# Patient Record
Sex: FEMALE | Race: WHITE | NOT HISPANIC OR LATINO | Employment: OTHER | URBAN - METROPOLITAN AREA
[De-identification: names, ages, dates, MRNs, and addresses within clinical notes are randomized per-mention and may not be internally consistent; named-entity substitution may affect disease eponyms.]

---

## 2017-04-11 ENCOUNTER — HOSPITAL ENCOUNTER (OUTPATIENT)
Dept: BONE DENSITY | Facility: IMAGING CENTER | Age: 62
Discharge: HOME | End: 2017-04-11
Payer: COMMERCIAL

## 2017-04-11 DIAGNOSIS — Z12.31 ENCOUNTER FOR SCREENING MAMMOGRAM FOR MALIGNANT NEOPLASM OF BREAST: ICD-10-CM

## 2017-04-11 PROCEDURE — G0202 SCR MAMMO BI INCL CAD: HCPCS

## 2017-07-17 LAB — HCV AB SER-ACNC: NON REACTIVE

## 2017-08-17 ENCOUNTER — ALLSCRIPTS OFFICE VISIT (OUTPATIENT)
Dept: OTHER | Facility: OTHER | Age: 62
End: 2017-08-17

## 2017-08-17 DIAGNOSIS — N39.3 STRESS INCONTINENCE: ICD-10-CM

## 2017-08-21 ENCOUNTER — ALLSCRIPTS OFFICE VISIT (OUTPATIENT)
Dept: OTHER | Facility: OTHER | Age: 62
End: 2017-08-21

## 2017-08-29 ENCOUNTER — GENERIC CONVERSION - ENCOUNTER (OUTPATIENT)
Dept: OTHER | Facility: OTHER | Age: 62
End: 2017-08-29

## 2017-10-04 ENCOUNTER — GENERIC CONVERSION - ENCOUNTER (OUTPATIENT)
Dept: OTHER | Facility: OTHER | Age: 62
End: 2017-10-04

## 2017-10-04 ENCOUNTER — GENERIC CONVERSION - ENCOUNTER (OUTPATIENT)
Dept: FAMILY MEDICINE CLINIC | Facility: CLINIC | Age: 62
End: 2017-10-04

## 2018-01-12 ENCOUNTER — GENERIC CONVERSION - ENCOUNTER (OUTPATIENT)
Dept: OTHER | Facility: OTHER | Age: 63
End: 2018-01-12

## 2018-01-12 ENCOUNTER — LAB CONVERSION - ENCOUNTER (OUTPATIENT)
Dept: OTHER | Facility: OTHER | Age: 63
End: 2018-01-12

## 2018-01-12 LAB
25(OH)D3 SERPL-MCNC: 43 NG/ML (ref 30–100)
A/G RATIO (HISTORICAL): 1.7 (CALC) (ref 1–2.5)
ALBUMIN SERPL BCP-MCNC: 4.3 G/DL (ref 3.6–5.1)
ALP SERPL-CCNC: 77 U/L (ref 33–130)
ALT SERPL W P-5'-P-CCNC: 20 U/L (ref 6–29)
AST SERPL W P-5'-P-CCNC: 16 U/L (ref 10–35)
BASOPHILS # BLD AUTO: 0.8 %
BASOPHILS # BLD AUTO: 48 CELLS/UL (ref 0–200)
BILIRUB SERPL-MCNC: 0.5 MG/DL (ref 0.2–1.2)
BUN SERPL-MCNC: 21 MG/DL (ref 7–25)
BUN/CREA RATIO (HISTORICAL): 19 (CALC) (ref 6–22)
CALCIUM SERPL-MCNC: 9.7 MG/DL (ref 8.6–10.4)
CHLORIDE SERPL-SCNC: 105 MMOL/L (ref 98–110)
CHOLEST SERPL-MCNC: 193 MG/DL
CHOLEST/HDLC SERPL: 3.3 (CALC)
CO2 SERPL-SCNC: 30 MMOL/L (ref 20–31)
CREAT SERPL-MCNC: 1.1 MG/DL (ref 0.5–0.99)
DEPRECATED RDW RBC AUTO: 12.9 % (ref 11–15)
EGFR AFRICAN AMERICAN (HISTORICAL): 61 ML/MIN/1.73M2
EGFR-AMERICAN CALC (HISTORICAL): 53 ML/MIN/1.73M2
EOSINOPHIL # BLD AUTO: 210 CELLS/UL (ref 15–500)
EOSINOPHIL # BLD AUTO: 3.5 %
GAMMA GLOBULIN (HISTORICAL): 2.6 G/DL (CALC) (ref 1.9–3.7)
GLUCOSE (HISTORICAL): 129 MG/DL (ref 65–99)
HBA1C MFR BLD HPLC: 6.4 % OF TOTAL HGB
HCT VFR BLD AUTO: 44 % (ref 35–45)
HDLC SERPL-MCNC: 58 MG/DL
HGB BLD-MCNC: 14.7 G/DL (ref 11.7–15.5)
LDL CHOLESTEROL (HISTORICAL): 116 MG/DL (CALC)
LYMPHOCYTES # BLD AUTO: 1770 CELLS/UL (ref 850–3900)
LYMPHOCYTES # BLD AUTO: 29.5 %
MCH RBC QN AUTO: 30.1 PG (ref 27–33)
MCHC RBC AUTO-ENTMCNC: 33.4 G/DL (ref 32–36)
MCV RBC AUTO: 90.2 FL (ref 80–100)
MONOCYTES # BLD AUTO: 600 CELLS/UL (ref 200–950)
MONOCYTES (HISTORICAL): 10 %
NEUTROPHILS # BLD AUTO: 3372 CELLS/UL (ref 1500–7800)
NEUTROPHILS # BLD AUTO: 56.2 %
NON-HDL-CHOL (CHOL-HDL) (HISTORICAL): 135 MG/DL (CALC)
PLATELET # BLD AUTO: 301 THOUSAND/UL (ref 140–400)
PMV BLD AUTO: 12.1 FL (ref 7.5–12.5)
POTASSIUM SERPL-SCNC: 4 MMOL/L (ref 3.5–5.3)
RBC # BLD AUTO: 4.88 MILLION/UL (ref 3.8–5.1)
SODIUM SERPL-SCNC: 143 MMOL/L (ref 135–146)
TOTAL PROTEIN (HISTORICAL): 6.9 G/DL (ref 6.1–8.1)
TRIGL SERPL-MCNC: 88 MG/DL
TSH SERPL DL<=0.05 MIU/L-ACNC: 1.04 MIU/L (ref 0.4–4.5)
WBC # BLD AUTO: 6 THOUSAND/UL (ref 3.8–10.8)

## 2018-01-13 VITALS
DIASTOLIC BLOOD PRESSURE: 84 MMHG | BODY MASS INDEX: 33.17 KG/M2 | SYSTOLIC BLOOD PRESSURE: 132 MMHG | HEIGHT: 62 IN | WEIGHT: 180.25 LBS

## 2018-01-15 ENCOUNTER — ALLSCRIPTS OFFICE VISIT (OUTPATIENT)
Dept: OTHER | Facility: OTHER | Age: 63
End: 2018-01-15

## 2018-01-15 VITALS
WEIGHT: 180.25 LBS | DIASTOLIC BLOOD PRESSURE: 72 MMHG | RESPIRATION RATE: 18 BRPM | HEIGHT: 62 IN | TEMPERATURE: 97 F | SYSTOLIC BLOOD PRESSURE: 138 MMHG | HEART RATE: 84 BPM | BODY MASS INDEX: 33.17 KG/M2

## 2018-01-22 VITALS
HEIGHT: 62 IN | HEART RATE: 78 BPM | WEIGHT: 180 LBS | BODY MASS INDEX: 33.13 KG/M2 | DIASTOLIC BLOOD PRESSURE: 86 MMHG | RESPIRATION RATE: 16 BRPM | SYSTOLIC BLOOD PRESSURE: 158 MMHG | TEMPERATURE: 97.6 F

## 2018-01-22 VITALS — SYSTOLIC BLOOD PRESSURE: 150 MMHG | DIASTOLIC BLOOD PRESSURE: 80 MMHG

## 2018-01-24 VITALS
DIASTOLIC BLOOD PRESSURE: 84 MMHG | RESPIRATION RATE: 16 BRPM | HEIGHT: 62 IN | TEMPERATURE: 98 F | WEIGHT: 176.5 LBS | SYSTOLIC BLOOD PRESSURE: 148 MMHG | BODY MASS INDEX: 32.48 KG/M2 | HEART RATE: 80 BPM

## 2018-01-24 LAB
ALBUMIN SERPL-MCNC: 4.4 G/DL (ref 3.6–5.1)
ALBUMIN/GLOB SERPL: 1.6 (CALC) (ref 1–2.5)
ALP SERPL-CCNC: 87 U/L (ref 33–130)
ALT SERPL-CCNC: 20 U/L (ref 6–29)
AST SERPL-CCNC: 16 U/L (ref 10–35)
BILIRUB DIRECT SERPL-MCNC: 0.1 MG/DL
BILIRUB INDIRECT SERPL-MCNC: 0.4 MG/DL (CALC) (ref 0.2–1.2)
BILIRUB SERPL-MCNC: 0.5 MG/DL (ref 0.2–1.2)
CK SERPL-CCNC: 38 U/L (ref 29–143)
GLOBULIN SER CALC-MCNC: 2.8 G/DL (CALC) (ref 1.9–3.7)
PROT SERPL-MCNC: 7.2 G/DL (ref 6.1–8.1)

## 2018-01-25 ENCOUNTER — TELEPHONE (OUTPATIENT)
Dept: FAMILY MEDICINE CLINIC | Facility: CLINIC | Age: 63
End: 2018-01-25

## 2018-01-25 DIAGNOSIS — R79.89 ELEVATED SERUM CREATININE: Primary | ICD-10-CM

## 2018-01-25 NOTE — TELEPHONE ENCOUNTER
Lm letting pt know her results and the conner recommendatins to have bloodwork rechecked in 1-2 weeks told her order was at the

## 2018-01-25 NOTE — PROGRESS NOTES
Previous message sent to staff regarding notifying patient of results  All results normal  She will proceed with 1/2 tablet of Atorvastatin daily  She will call with any further muscle aches

## 2018-01-25 NOTE — TELEPHONE ENCOUNTER
----- Message from [id number] sent at 1/25/2018  8:36 AM EST -----  Regarding: Blood pressure and Blood work   I received her blood pressures  They are on the border of being high  I would like for her to repeat her blood work to check her kidney function in the next 1-2 weeks  Please do not fast for this and drink plenty of fluids before having the test done  I would like to recheck her kidneys before considering adjusting her blood pressure medications  Also please let her know that her recent blood work to check her liver and muscles is normal  She can restart the Atorvastatin 1/2 tablet per day on Monday  She should let me know if she has any muscle aches on the new dose  Thank you

## 2018-02-16 ENCOUNTER — OFFICE VISIT (OUTPATIENT)
Dept: FAMILY MEDICINE CLINIC | Facility: CLINIC | Age: 63
End: 2018-02-16
Payer: COMMERCIAL

## 2018-02-16 VITALS
HEART RATE: 84 BPM | DIASTOLIC BLOOD PRESSURE: 86 MMHG | RESPIRATION RATE: 16 BRPM | WEIGHT: 174.8 LBS | SYSTOLIC BLOOD PRESSURE: 136 MMHG | BODY MASS INDEX: 33 KG/M2 | TEMPERATURE: 97.4 F | HEIGHT: 61 IN

## 2018-02-16 DIAGNOSIS — E11.9 TYPE 2 DIABETES MELLITUS WITHOUT COMPLICATION, WITHOUT LONG-TERM CURRENT USE OF INSULIN (HCC): ICD-10-CM

## 2018-02-16 DIAGNOSIS — I10 BENIGN ESSENTIAL HYPERTENSION: Primary | ICD-10-CM

## 2018-02-16 DIAGNOSIS — E78.5 DYSLIPIDEMIA: ICD-10-CM

## 2018-02-16 PROBLEM — E66.9 OBESITY (BMI 30-39.9): Status: ACTIVE | Noted: 2018-01-12

## 2018-02-16 PROBLEM — K64.9 HEMORRHOID: Status: ACTIVE | Noted: 2018-01-15

## 2018-02-16 PROBLEM — G47.33 OSA (OBSTRUCTIVE SLEEP APNEA): Status: ACTIVE | Noted: 2018-01-12

## 2018-02-16 PROBLEM — E55.9 HYPOVITAMINOSIS D: Status: ACTIVE | Noted: 2017-08-21

## 2018-02-16 PROBLEM — M17.0 OSTEOARTHRITIS OF BOTH KNEES: Status: ACTIVE | Noted: 2017-08-26

## 2018-02-16 PROCEDURE — 3075F SYST BP GE 130 - 139MM HG: CPT | Performed by: NURSE PRACTITIONER

## 2018-02-16 PROCEDURE — 99214 OFFICE O/P EST MOD 30 MIN: CPT | Performed by: NURSE PRACTITIONER

## 2018-02-16 PROCEDURE — 3079F DIAST BP 80-89 MM HG: CPT | Performed by: NURSE PRACTITIONER

## 2018-02-16 RX ORDER — AMLODIPINE BESYLATE 10 MG/1
1 TABLET ORAL DAILY
COMMUNITY
Start: 2018-01-15 | End: 2018-10-19 | Stop reason: SDUPTHER

## 2018-02-16 RX ORDER — ATORVASTATIN CALCIUM 10 MG/1
1 TABLET, FILM COATED ORAL DAILY
COMMUNITY
Start: 2018-01-15 | End: 2018-02-16 | Stop reason: SINTOL

## 2018-02-16 RX ORDER — REPAGLINIDE 0.5 MG/1
1 TABLET ORAL EVERY 8 HOURS
COMMUNITY
Start: 2018-01-15 | End: 2018-04-23 | Stop reason: SDUPTHER

## 2018-02-16 RX ORDER — PREDNISOLONE ACETATE 10 MG/ML
SUSPENSION/ DROPS OPHTHALMIC
COMMUNITY
Start: 2017-06-06 | End: 2018-02-16 | Stop reason: ALTCHOICE

## 2018-02-16 RX ORDER — LORATADINE 10 MG/1
1 TABLET ORAL DAILY PRN
COMMUNITY
Start: 2018-01-15

## 2018-02-16 RX ORDER — BIOTIN 1 MG
TABLET ORAL
COMMUNITY
Start: 2017-08-21

## 2018-02-16 RX ORDER — CHOLECALCIFEROL (VITAMIN D3) 125 MCG
CAPSULE ORAL
COMMUNITY

## 2018-02-16 RX ORDER — FLUTICASONE PROPIONATE 50 MCG
2 SPRAY, SUSPENSION (ML) NASAL DAILY PRN
COMMUNITY

## 2018-02-16 RX ORDER — LISINOPRIL 40 MG/1
1 TABLET ORAL DAILY
COMMUNITY
Start: 2014-09-30 | End: 2018-03-20 | Stop reason: SDUPTHER

## 2018-02-16 NOTE — ASSESSMENT & PLAN NOTE
Blood pressure borderline on Amlodipine 10 mg daily and lisinopril 40 mg daily  Will consider adjusting her medication based on BMP results  I requested she repeat BMP end of January for climbing creatinine  States she did have this done at [address], no results noted in chart  Will call [facility] to inquire and will let patient know results  She will hold onto current slip for BMP, until she hears from us regarding recent results  She will continue to work on lifestyle modifications, including reducing caffeine intake

## 2018-02-16 NOTE — ASSESSMENT & PLAN NOTE
Started on Repaglinide one month ago  She is tolerating medication well  Taking this medication 1-2 times per day with meals  A1C in April  Foot exam up to date as of January  Eye exam completed in January as well  Follow up in 3 months  Declines diabetic nutrition teaching  Continue with lifestyle modifications with diet and exercise

## 2018-02-16 NOTE — ASSESSMENT & PLAN NOTE
Stopped taking Atorvastatin due to muscle aches, and she felt this increased her BP  She does not desire to take another statin at this time  She will try Red Yeast Rice and Flaxseed oil  Repeat Lipid panel in April  Continue lifestyle modifications with diet and exercise

## 2018-02-16 NOTE — PROGRESS NOTES
FAMILY PRACTICE OFFICE VISIT       NAME: Mandie Landis  AGE: 57 y o  SEX: female       : 1955        MRN: [id number]    DATE: 2018  TIME: 1:54 PM    Assessment and Plan     Problem List Items Addressed This Visit     Benign essential hypertension - Primary     Blood pressure borderline on Amlodipine 10 mg daily and lisinopril 40 mg daily  Will consider adjusting her medication based on BMP results  I requested she repeat BMP end of January for climbing creatinine  States she did have this done at [address], no results noted in chart  Will call [facility] to inquire and will let patient know results  She will hold onto current slip for BMP, until she hears from us regarding recent results  She will continue to work on lifestyle modifications, including reducing caffeine intake  Relevant Medications    amLODIPine (NORVASC) 10 mg tablet    lisinopril (ZESTRIL) 40 mg tablet    Other Relevant Orders    Basic metabolic panel    Dyslipidemia     Stopped taking Atorvastatin due to muscle aches, and she felt this increased her BP  She does not desire to take another statin at this time  She will try Red Yeast Rice and Flaxseed oil  Repeat Lipid panel in April  Continue lifestyle modifications with diet and exercise  Type 2 diabetes mellitus ([address] )     Started on Repaglinide one month ago  She is tolerating medication well  Taking this medication 1-2 times per day with meals  A1C in April  Foot exam up to date as of January  Eye exam completed in January as well  Follow up in 3 months  Declines diabetic nutrition teaching  Continue with lifestyle modifications with diet and exercise  Relevant Medications    repaglinide (PRANDIN) 0 5 mg tablet    Other Relevant Orders    Basic metabolic panel    Ambulatory referral to medical nutrition therapy for diabetes        Follow up in 3 months       Chief Complaint     Chief Complaint   Patient presents with    Follow-up     Patient is here for a followup  History of Present Illness     Mrs Maria Dewitt presents today for follow up exam      Blood pressure is running borderline at home, 130's-140's/80's  Currently taking amlodipine and lisinopril  She has cut her caffeine consumption in half  Working on cutting out all caffeine  She stopped taking Atorvastatin due to muscle aches in her upper arms and upper thighs  She also felt her BP was higher while taking Atorvastain  She does not wish to try another statin at this time  She is taking Prandin 1-2 times per day with her meals  She does not eat breakfast usually  Lunch she usually eats a sandwich  She noted if she took the medication and just had a sandwich for lunch, by late afternoon she felt as if her sugar was low with "starving, and feeling as if I had to eat " She always takes Prandin with dinner, and if she has a big meal at lunch  She is cutting back on candy and sweets  Review of Systems   Review of Systems   Constitutional: Negative  HENT: Negative  Eyes: Negative  Respiratory: Negative  Cardiovascular: Negative  Gastrointestinal: Negative  Stools improved on Prandin, used to have frequent loose stools, now stools are  More formed and less frequent  No constipation  Endocrine: Negative  Genitourinary: Negative for difficulty urinating  Musculoskeletal: Negative for myalgias  Neurological: Negative for dizziness, weakness, light-headedness and headaches  Hematological: Negative for adenopathy  Does not bruise/bleed easily  Psychiatric/Behavioral: Negative  Active Problem List     Patient Active Problem List   Diagnosis    Benign essential hypertension    Dyslipidemia    Type 2 diabetes mellitus (HCC)    Osteoarthritis of both knees    ERNESTINE (obstructive sleep apnea)    Obesity (BMI 30-39  9)    Hypovitaminosis D    Hemorrhoid       Past Medical History:  Past Medical History:   Diagnosis Date    Adhesive capsulitis of right shoulder     LAST ASSESSED 10/12/18     2 para 2     Hypertension     Nephrolithiasis     Prediabetes     LAST ASSESSED 17       Past Surgical History:  Past Surgical History:   Procedure Laterality Date    ABDOMINAL HYSTERECTOMY      CATARACT EXTRACTION      GALLBLADDER SURGERY      TONSILLECTOMY         Family History:  Family History   Problem Relation Age of Onset    Alcohol abuse Mother     Hypertension Mother     Stroke Mother     Breast cancer Sister        Social History:  Social History     Social History    Marital status: /Civil Union     Spouse name: N/A    Number of children: 2    Years of education: N/A     Occupational History    RETIRED      Social History Main Topics    Smoking status: Never Smoker    Smokeless tobacco: Never Used    Alcohol use No    Drug use: No    Sexual activity: Not on file     Other Topics Concern    Not on file     Social History Narrative    CAFFEINE USE     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           I have reviewed the patient's medical history in detail; there are no changes to the history as noted in the electronic medical record  Objective     Vitals:    18 0736   BP: 136/86   Pulse: 84   Resp: 16   Temp: (!) 97 4 °F (36 3 °C)   TempSrc: Tympanic   Weight: 79 3 kg (174 lb 12 8 oz)   Height: 5' 1" (1 549 m)   Body mass index is 33 03 kg/m²  Wt Readings from Last 3 Encounters:   18 79 3 kg (174 lb 12 8 oz)   01/15/18 80 1 kg (176 lb 8 oz)   10/04/17 81 6 kg (180 lb)       Physical Exam   Constitutional: She is oriented to person, place, and time  She appears well-developed and well-nourished  HENT:   Head: Normocephalic and atraumatic  Mouth/Throat: Oropharynx is clear and moist    Eyes: Conjunctivae are normal    Neck: Normal range of motion  Neck supple  No thyromegaly present  Cardiovascular: Normal rate and regular rhythm  No murmur heard    Pulmonary/Chest: Effort normal and breath sounds normal  Musculoskeletal: Normal range of motion  She exhibits no edema  Lymphadenopathy:     She has no cervical adenopathy  Neurological: She is alert and oriented to person, place, and time  Skin: No rash noted  Psychiatric: She has a normal mood and affect  Nursing note and vitals reviewed  Pertinent Laboratory/Diagnostic Studies:    Lab Results   Component Value Date    CHOL 193 01/11/2018     Lab Results   Component Value Date    TRIG 88 01/11/2018     Lab Results   Component Value Date    HDL 58 01/11/2018     No results found for: [facility]  Lab Results   Component Value Date    HGBA1C 6 4 (H) 01/11/2018       Results for orders placed or performed in visit on 01/24/18   Hepatic function panel   Result Value Ref Range    SL AMB PROTEIN, TOTAL 7 2 6 1 - 8 1 g/dL    Serum Albumin 4 4 3 6 - 5 1 g/dL    SL AMB GLOBULIN 2 8 1 9 - 3 7 g/dL (calc)    SL AMB ALBUMIN/GLOBULIN RATIO 1 6 1 0 - 2 5 (calc)    SL AMB BILIRUBIN, TOTAL 0 5 0 2 - 1 2 mg/dL    SL AMB BILIRUBIN, DIRECT 0 1 < OR = 0 2 mg/dL    Bilirubin, Indirect 0 4 0 2 - 1 2 mg/dL (calc)    SL AMB ALKALINE PHOSPHATASE 87 33 - 130 U/L    SL AMB AST 16 10 - 35 U/L    SL AMB ALT 20 6 - 29 U/L   Creatine Kinase, Total   Result Value Ref Range    SL AMB CREATINE KINASE, TOTAL, SERUM 38 29 - 143 U/L       Orders Placed This Encounter   Procedures    Basic metabolic panel    Ambulatory referral to medical nutrition therapy for diabetes       ALLERGIES:  Allergies   Allergen Reactions    Sulfa Antibiotics Myalgia     Action Taken: joint pain and stiffness;        Current Medications     Current Outpatient Prescriptions   Medication Sig Dispense Refill    amLODIPine (NORVASC) 10 mg tablet Take 1 tablet by mouth daily      Cholecalciferol (VITAMIN D3) 1000 units CAPS Take by mouth      fluticasone (FLONASE) 50 mcg/act nasal spray 2 sprays into each nostril daily      Lactobacillus (PROBIOTIC ACIDOPHILUS) CAPS Take 1 tablet daily as supplement        lisinopril (ZESTRIL) 40 mg tablet Take 1 tablet by mouth daily      loratadine (CLARITIN) 10 mg tablet Take 1 tablet by mouth daily as needed      repaglinide (PRANDIN) 0 5 mg tablet Take 1 tablet by mouth every 8 (eight) hours       No current facility-administered medications for this visit            Health Maintenance     Health Maintenance   Topic Date Due    HIV SCREENING  1955    Hepatitis C Screening  1955    DTaP,Tdap,and Td Vaccines (1 - Tdap) 10/26/1960    Diabetic Foot Exam  10/26/1963    OPHTHALMOLOGY EXAM  10/26/1963    URINE MICROALBUMIN  10/26/1963    Depression Screening PHQ-9  10/26/1965    INFLUENZA VACCINE  Completed     Immunization History   Administered Date(s) Administered    Influenza Quadrivalent Preservative Free 3 years and older IM 10/04/2017    Influenza TIV (IM) 09/01/2014, 01/05/2016, 10/26/2016    Pneumococcal Polysaccharide PPV23 07/12/2016    Zoster 01/01/2014, 08/30/2016       JOSHUA Padilla

## 2018-02-22 LAB
BUN SERPL-MCNC: 18 MG/DL (ref 7–25)
BUN/CREAT SERPL: 18 (CALC) (ref 6–22)
CALCIUM SERPL-MCNC: 9.5 MG/DL (ref 8.6–10.4)
CHLORIDE SERPL-SCNC: 102 MMOL/L (ref 98–110)
CO2 SERPL-SCNC: 28 MMOL/L (ref 20–31)
CREAT SERPL-MCNC: 1 MG/DL (ref 0.5–0.99)
GLUCOSE SERPL-MCNC: 90 MG/DL (ref 65–99)
POTASSIUM SERPL-SCNC: 4 MMOL/L (ref 3.5–5.3)
SL AMB EGFR AFRICAN AMERICAN: 69 ML/MIN/1.73M2
SL AMB EGFR NON AFRICAN AMERICAN: 59 ML/MIN/1.73M2
SODIUM SERPL-SCNC: 140 MMOL/L (ref 135–146)

## 2018-02-26 NOTE — RESULT NOTES
Verified Results  (1) COMPREHENSIVE METABOLIC PANEL [id number] 55:13WX Melanie Peña     Test Name Result Flag Reference   GLUCOSE 129 mg/dL H 65-99   Fasting reference interval     For someone without known diabetes, a glucose  value >125 mg/dL indicates that they may have  diabetes and this should be confirmed with a  follow-up test    UREA NITROGEN (BUN) 21 mg/dL  7-25   CREATININE 1 10 mg/dL H 0 50-0 99   For patients >52years of age, the reference limit  for Creatinine is approximately 13% higher for people  identified as -American  eGFR NON-AFR   AMERICAN 53 mL/min/1 73m2 L > OR = 60   eGFR AFRICAN AMERICAN 61 mL/min/1 73m2  > OR = 60   BUN/CREATININE RATIO 19 (calc)  6-22   SODIUM 143 mmol/L  135-146   POTASSIUM 4 0 mmol/L  3 5-5 3   CHLORIDE 105 mmol/L     CARBON DIOXIDE 30 mmol/L  20-31   CALCIUM 9 7 mg/dL  8 6-10 4   PROTEIN, TOTAL 6 9 g/dL  6 1-8 1   ALBUMIN 4 3 g/dL  3 6-5 1   GLOBULIN 2 6 g/dL (calc)  1 9-3 7   ALBUMIN/GLOBULIN RATIO 1 7 (calc)  1 0-2 5   BILIRUBIN, TOTAL 0 5 mg/dL  0 2-1 2   ALKALINE PHOSPHATASE 77 U/L     AST 16 U/L  10-35   ALT 20 U/L  6-29     (1) CBC/PLT/DIFF [id number] 08:27AM Ariadna Arroyo     Test Name Result Flag Reference   WHITE BLOOD CELL COUNT 6 0 Thousand/uL  3 8-10 8   RED BLOOD CELL COUNT 4 88 Million/uL  3 80-5 10   HEMOGLOBIN 14 7 g/dL  11 7-15 5   HEMATOCRIT 44 0 %  35 0-45 0   MCV 90 2 fL  80 0-100 0   MCH 30 1 pg  27 0-33 0   MCHC 33 4 g/dL  32 0-36 0   RDW 12 9 %  11 0-15 0   PLATELET COUNT 936 Thousand/uL  140-400   ABSOLUTE NEUTROPHILS 3372 cells/uL  7807-6662   ABSOLUTE LYMPHOCYTES 1770 cells/uL  850-3900   ABSOLUTE MONOCYTES 600 cells/uL  200-950   ABSOLUTE EOSINOPHILS 210 cells/uL     ABSOLUTE BASOPHILS 48 cells/uL  0-200   NEUTROPHILS 56 2 %     LYMPHOCYTES 29 5 %     MONOCYTES 10 0 %     EOSINOPHILS 3 5 %     BASOPHILS 0 8 %     MPV 12 1 fL  7 5-12 5     (Q) LIPID PANEL WITH REFLEX TO DIRECT LDL [id number] 08:27AM Melanie [facility]     Test Name Result Flag Reference   CHOLESTEROL, TOTAL 193 mg/dL  <200   HDL CHOLESTEROL 58 mg/dL  >06   TRIGLICERIDES 88 mg/dL  <605   LDL-CHOLESTEROL 116 mg/dL (calc) H    Reference range: <100     Desirable range <100 mg/dL for patients with CHD or  diabetes and <70 mg/dL for diabetic patients with  known heart disease  LDL-C is now calculated using the Ap-Vitale   calculation, which is a validated novel method providing   better accuracy than the Friedewald equation in the   estimation of LDL-C  Rogelio Alvarez  [facility]  2981;418(10): [id number]   (http://ThetaRay/faq/QIS140)   CHOL/HDLC RATIO 3 3 (calc)  <5 0   NON HDL CHOLESTEROL 135 mg/dL (calc) H <130   For patients with diabetes plus 1 major ASCVD risk   factor, treating to a non-HDL-C goal of <100 mg/dL   (LDL-C of <70 mg/dL) is considered a therapeutic   option  (Q) TSH, 3RD GENERATION [id number] 08:27AM Kelly Stewart     Test Name Result Flag Reference   TSH 1 04 mIU/L  0 40-4 50     *(Q) VITAMIN D, 25-HYDROXY, LC/MS/MS [id number] 08:27AM Ariadna Feliciano     Test Name Result Flag Reference   VITAMIN D, 25-OH, TOTAL 43 ng/mL     Vitamin D Status         25-OH Vitamin D:     Deficiency:                    <20 ng/mL  Insufficiency:             20 - 29 ng/mL  Optimal:                 > or = 30 ng/mL     For 25-OH Vitamin D testing on patients on   D2-supplementation and patients for whom quantitation   of D2 and D3 fractions is required, the QuestAssureD(TM)  25-OH VIT D, (D2,D3), LC/MS/MS is recommended: order   code 88048 (patients >2yrs)  For more information on this test, go to:  http://Hiveoo/faq/VHV234  (This link is being provided for   informational/educational purposes only )     (Q) HEMOGLOBIN A1c [id number] 08:27AM Ariadna Feliciano   REPORT COMMENT:  FASTING:YES     Test Name Result Flag Reference   HEMOGLOBIN A1c 6 4 % of total Hgb H <5 7   For someone without known diabetes, a hemoglobin   A1c value between 5 7% and 6 4% is consistent with  prediabetes and should be confirmed with a   follow-up test      For someone with known diabetes, a value <7%  indicates that their diabetes is well controlled  A1c  targets should be individualized based on duration of  diabetes, age, comorbid conditions, and other  considerations  This assay result is consistent with an increased risk  of diabetes  Currently, no consensus exists regarding use of  hemoglobin A1c for diagnosis of diabetes for children

## 2018-02-28 DIAGNOSIS — I10 ESSENTIAL HYPERTENSION: Primary | ICD-10-CM

## 2018-02-28 RX ORDER — METOPROLOL SUCCINATE 25 MG/1
25 TABLET, EXTENDED RELEASE ORAL DAILY
Qty: 30 TABLET | Refills: 3 | Status: SHIPPED | OUTPATIENT
Start: 2018-02-28 | End: 2018-03-20 | Stop reason: SDUPTHER

## 2018-02-28 NOTE — PROGRESS NOTES
Blood work including kidney function is stable  I would like to add an additional medication to better control her blood pressure, in addition to her amlodipine and lisinopril  I have sent a prescription to the pharmacy for Metoprolol 25 mg daily  Please have her check her blood pressures and send me her blood pressure log in 2 weeks  Please have her call with any questions

## 2018-03-20 ENCOUNTER — TELEPHONE (OUTPATIENT)
Dept: FAMILY MEDICINE CLINIC | Facility: CLINIC | Age: 63
End: 2018-03-20

## 2018-03-20 DIAGNOSIS — I10 ESSENTIAL HYPERTENSION: ICD-10-CM

## 2018-03-20 PROCEDURE — 4010F ACE/ARB THERAPY RXD/TAKEN: CPT | Performed by: NURSE PRACTITIONER

## 2018-03-20 RX ORDER — LISINOPRIL 40 MG/1
40 TABLET ORAL DAILY
Qty: 90 TABLET | Refills: 3 | Status: SHIPPED | OUTPATIENT
Start: 2018-03-20 | End: 2019-03-20 | Stop reason: SDUPTHER

## 2018-03-20 RX ORDER — METOPROLOL SUCCINATE 25 MG/1
25 TABLET, EXTENDED RELEASE ORAL DAILY
Qty: 90 TABLET | Refills: 3 | Status: SHIPPED | OUTPATIENT
Start: 2018-03-20 | End: 2018-06-18 | Stop reason: SDUPTHER

## 2018-03-20 NOTE — TELEPHONE ENCOUNTER
Please call patient:    I received her blood pressures  Some blood pressures are excellent 120's/70's, and some are still borderline 150's/70's  If she could bring in her blood pressure log again in 2 weeks, I would appreciate it  I did send her prescriptions to the pharmacy for Lisinopril and Metoprolol  Thank you

## 2018-04-13 ENCOUNTER — TELEPHONE (OUTPATIENT)
Dept: FAMILY MEDICINE CLINIC | Facility: CLINIC | Age: 63
End: 2018-04-13

## 2018-04-13 NOTE — TELEPHONE ENCOUNTER
Please call patient and let her know, I received her blood pressures  Please thank her for bringing them in  BP is coming down nicely  Continue with the same medication  Follow up as scheduled

## 2018-04-17 LAB
ALBUMIN SERPL-MCNC: 4.2 G/DL (ref 3.6–5.1)
ALBUMIN/GLOB SERPL: 1.4 (CALC) (ref 1–2.5)
ALP SERPL-CCNC: 77 U/L (ref 33–130)
ALT SERPL-CCNC: 21 U/L (ref 6–29)
AST SERPL-CCNC: 17 U/L (ref 10–35)
BILIRUB SERPL-MCNC: 0.5 MG/DL (ref 0.2–1.2)
BUN SERPL-MCNC: 18 MG/DL (ref 7–25)
BUN/CREAT SERPL: ABNORMAL (CALC) (ref 6–22)
CALCIUM SERPL-MCNC: 9.5 MG/DL (ref 8.6–10.4)
CHLORIDE SERPL-SCNC: 103 MMOL/L (ref 98–110)
CHOLEST SERPL-MCNC: 214 MG/DL
CHOLEST/HDLC SERPL: 3.5 (CALC)
CO2 SERPL-SCNC: 30 MMOL/L (ref 20–31)
CREAT SERPL-MCNC: 0.96 MG/DL (ref 0.5–0.99)
GLOBULIN SER CALC-MCNC: 2.9 G/DL (CALC) (ref 1.9–3.7)
GLUCOSE SERPL-MCNC: 104 MG/DL (ref 65–99)
HBA1C MFR BLD: 5.7 % OF TOTAL HGB
HDLC SERPL-MCNC: 61 MG/DL
LDLC SERPL CALC-MCNC: 129 MG/DL (CALC)
NONHDLC SERPL-MCNC: 153 MG/DL (CALC)
POTASSIUM SERPL-SCNC: 4.3 MMOL/L (ref 3.5–5.3)
PROT SERPL-MCNC: 7.1 G/DL (ref 6.1–8.1)
SL AMB EGFR AFRICAN AMERICAN: 72 ML/MIN/1.73M2
SL AMB EGFR NON AFRICAN AMERICAN: 62 ML/MIN/1.73M2
SODIUM SERPL-SCNC: 141 MMOL/L (ref 135–146)
TRIGL SERPL-MCNC: 128 MG/DL

## 2018-04-19 ENCOUNTER — TELEPHONE (OUTPATIENT)
Dept: FAMILY MEDICINE CLINIC | Facility: CLINIC | Age: 63
End: 2018-04-19

## 2018-04-19 NOTE — TELEPHONE ENCOUNTER
----- Message from [id number] [facility] sent at 4/19/2018 11:41 AM EDT -----  Kidney function is stable  Hemoglobin A1C is down to 5 7% with Prandin  Keep up the good work  Cholesterol is elevated compared to January  Continue to work on eating a low fat, low cholesterol, low carbohydrate diet and increasing activity  I would also like her to begin taking a daily baby aspirin, which is 81 mg per day  Follow up as scheduled

## 2018-04-19 NOTE — PROGRESS NOTES
Kidney function is stable  Hemoglobin A1C is down to 5 7% with Prandin  Keep up the good work  Cholesterol is elevated compared to January  Continue to work on eating a low fat, low cholesterol, low carbohydrate diet and increasing activity  I would also like her to begin taking a daily baby aspirin, which is 81 mg per day  Follow up as scheduled

## 2018-04-23 ENCOUNTER — OFFICE VISIT (OUTPATIENT)
Dept: FAMILY MEDICINE CLINIC | Facility: CLINIC | Age: 63
End: 2018-04-23
Payer: COMMERCIAL

## 2018-04-23 VITALS
WEIGHT: 177 LBS | HEIGHT: 61 IN | SYSTOLIC BLOOD PRESSURE: 140 MMHG | RESPIRATION RATE: 16 BRPM | HEART RATE: 70 BPM | DIASTOLIC BLOOD PRESSURE: 76 MMHG | TEMPERATURE: 98.2 F | BODY MASS INDEX: 33.42 KG/M2

## 2018-04-23 DIAGNOSIS — G47.33 OBSTRUCTIVE SLEEP APNEA: ICD-10-CM

## 2018-04-23 DIAGNOSIS — Z12.11 COLON CANCER SCREENING: ICD-10-CM

## 2018-04-23 DIAGNOSIS — Z00.00 ROUTINE HEALTH MAINTENANCE: ICD-10-CM

## 2018-04-23 DIAGNOSIS — I10 HYPERTENSION, UNSPECIFIED TYPE: Primary | ICD-10-CM

## 2018-04-23 DIAGNOSIS — E78.5 HYPERLIPIDEMIA, UNSPECIFIED HYPERLIPIDEMIA TYPE: ICD-10-CM

## 2018-04-23 DIAGNOSIS — E11.9 TYPE 2 DIABETES MELLITUS WITHOUT COMPLICATION, WITHOUT LONG-TERM CURRENT USE OF INSULIN (HCC): ICD-10-CM

## 2018-04-23 PROCEDURE — 99214 OFFICE O/P EST MOD 30 MIN: CPT | Performed by: FAMILY MEDICINE

## 2018-04-23 RX ORDER — REPAGLINIDE 0.5 MG/1
0.5 TABLET ORAL EVERY 8 HOURS
Qty: 180 TABLET | Refills: 2 | Status: SHIPPED | OUTPATIENT
Start: 2018-04-23 | End: 2018-08-27 | Stop reason: SDUPTHER

## 2018-04-23 NOTE — ASSESSMENT & PLAN NOTE
Patient states she tried to wear CPAP however was unable to wear this as changes positions frequently at night  Patient states she feels well rested in the morning    May benefit from evaluation by sleep specialist

## 2018-04-23 NOTE — ASSESSMENT & PLAN NOTE
BP initially elevated, however upon recheck, decreased  Will continue to monitor  Continue with amlodipine, lisinopril, metoprolol as well as lifestyle modifications  Patient does maintain active lifestyle and tries to put in 10,000 steps daily

## 2018-04-23 NOTE — ASSESSMENT & PLAN NOTE
Patient has previously taken atorvastatin however unable to tolerate this due to side effects  She will continue with lifestyle modifications including diet and exercise

## 2018-04-23 NOTE — PROGRESS NOTES
FAMILY PRACTICE OFFICE VISIT       NAME: Erickson Zhu  AGE: 57 y o  SEX: female       : 1955        MRN: [id number]    DATE: 2018  TIME: 9:30 PM    Assessment and Plan     Problem List Items Addressed This Visit     Type 2 diabetes mellitus ([address] )      Recent A1c is noted to be 5 7  I have asked patient to lower her frequency of prandin however patient would like to continue with her current regimen which is twice daily with meals  Will provide  Blood work Rx to recheck A1c in 4 months  Continue with lifestyle modifications  Up-to-date with ophthalmology and foot exams  Relevant Medications    repaglinide (PRANDIN) 0 5 mg tablet    Other Relevant Orders    HEMOGLOBIN A1C W/ EAG ESTIMATION    Basic metabolic panel    Microalbumin / creatinine urine ratio    Obstructive sleep apnea      Patient states she tried to wear CPAP however was unable to wear this as changes positions frequently at night  Patient states she feels well rested in the morning  May benefit from evaluation by sleep specialist          Hypertension - Primary      BP initially elevated, however upon recheck, decreased  Will continue to monitor  Continue with amlodipine, lisinopril, metoprolol as well as lifestyle modifications  Patient does maintain active lifestyle and tries to put in 10,000 steps daily  Relevant Orders    Basic metabolic panel    Hyperlipidemia       Patient has previously taken atorvastatin however unable to tolerate this due to side effects  She will continue with lifestyle modifications including diet and exercise  Routine health maintenance       Patient will schedule mammogram which is ordered by gyn  Referral to GI provided for screening colonoscopy as patient is due  Other Visit Diagnoses     Colon cancer screening        Relevant Orders    Ambulatory referral to Gastroenterology        Follow-up in 4 months with [facility] or sooner if needed        There are no Patient Instructions on file for this visit  Chief Complaint     Chief Complaint   Patient presents with    Follow-up     Patient presents today for her 3 month follow up  History of Present Illness     HPI    Patient is here for follow-up of chronic medical conditions  Patient did have recent blood work done  States she is doing well overall  Patient states she has a history of sleep apnea however does not use or sleep mask as she changes positions too frequently and was not able to tolerate the sleep mask  Patient states she does feel well rested when she wakes up in the morning  She has been very active and gets approximately 10,000 steps daily  Review of Systems   Review of Systems   Constitutional: Negative for unexpected weight change  HENT: Negative  Eyes: Negative for visual disturbance  Respiratory: Negative for shortness of breath  Cardiovascular: Negative  Gastrointestinal: Negative for abdominal pain and constipation  Genitourinary: Negative for dysuria  Psychiatric/Behavioral: Negative for dysphoric mood and sleep disturbance  Active Problem List     Patient Active Problem List   Diagnosis    Benign essential hypertension    Dyslipidemia    Type 2 diabetes mellitus ([address] )    Osteoarthritis of both knees    Obstructive sleep apnea    Obesity (BMI 30-39  9)    Hypovitaminosis D    Hemorrhoid    Hypertension    Hyperlipidemia    Routine health maintenance       Past Medical History:  Past Medical History:   Diagnosis Date    Adhesive capsulitis of right shoulder     LAST ASSESSED 10/12/18     2 para 2     Hypertension     Nephrolithiasis     Prediabetes     LAST ASSESSED 17       Past Surgical History:  Past Surgical History:   Procedure Laterality Date    ABDOMINAL HYSTERECTOMY      CATARACT EXTRACTION      GALLBLADDER SURGERY      TONSILLECTOMY         Family History:  Family History   Problem Relation Age of Onset    Alcohol abuse Mother     Hypertension Mother     Stroke Mother     Breast cancer Sister        Social History:  Social History     Social History    Marital status: /Civil Union     Spouse name: N/A    Number of children: 2    Years of education: N/A     Occupational History    RETIRED      Social History Main Topics    Smoking status: Never Smoker    Smokeless tobacco: Never Used    Alcohol use No    Drug use: No    Sexual activity: Not on file     Other Topics Concern    Not on file     Social History Narrative    CAFFEINE USE     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         I have reviewed the patient's medical history in detail; there are no changes to the history as noted in the electronic medical record  Objective     Vitals:    04/23/18 1005   BP: 140/76   Pulse:    Resp:    Temp:      Wt Readings from Last 3 Encounters:   04/23/18 80 3 kg (177 lb)   02/16/18 79 3 kg (174 lb 12 8 oz)   01/15/18 80 1 kg (176 lb 8 oz)     Vitals:    04/23/18 0927 04/23/18 1005   BP: 144/74 140/76   BP Location: Right arm    Patient Position: Sitting    Cuff Size: Large    Pulse: 70    Resp: 16    Temp: 98 2 °F (36 8 °C)    TempSrc: Tympanic    Weight: 80 3 kg (177 lb)    Height: 5' 1" (1 549 m)        Physical Exam   Constitutional: She is oriented to person, place, and time  She appears well-developed and well-nourished  HENT:   Head: Normocephalic and atraumatic  Mouth/Throat: Oropharynx is clear and moist      Tms intact and clear   Eyes: Conjunctivae and EOM are normal  Pupils are equal, round, and reactive to light  Neck: Normal range of motion  Neck supple  No thyromegaly present  Cardiovascular: Normal rate, regular rhythm and normal heart sounds  Pulmonary/Chest: Effort normal and breath sounds normal    Abdominal: Soft  Bowel sounds are normal  She exhibits no distension  There is no tenderness  Musculoskeletal: Normal range of motion  She exhibits no edema     Lymphadenopathy:     She has no cervical adenopathy  Neurological: She is alert and oriented to person, place, and time  Psychiatric: She has a normal mood and affect  Nursing note and vitals reviewed        Pertinent Laboratory/Diagnostic Studies:  Lab Results   Component Value Date    GLUCOSE 101 02/12/2014    BUN 18 04/16/2018    CREATININE 0 96 04/16/2018    CALCIUM 9 5 04/16/2018     01/11/2018    K 4 0 01/11/2018    CO2 30 01/11/2018     01/11/2018     Lab Results   Component Value Date    ALT 20 01/11/2018    AST 16 01/11/2018    ALKPHOS 77 01/11/2018    BILITOT 0 5 01/11/2018       Lab Results   Component Value Date    WBC 6 0 01/11/2018    HGB 14 7 01/11/2018    HCT 44 0 01/11/2018    MCV 90 2 01/11/2018     01/11/2018       No results found for: TSH    Lab Results   Component Value Date    CHOL 193 01/11/2018     Lab Results   Component Value Date    TRIG 128 04/16/2018     Lab Results   Component Value Date    HDL 58 01/11/2018     No results found for: [facility]  Lab Results   Component Value Date    HGBA1C 5 7 (H) 04/16/2018       Results for orders placed or performed in visit on 04/16/18   Lipid panel   Result Value Ref Range    Total Cholesterol 214 (H) <200 mg/dL    SL AMB HDL CHOLESTEROL 61 >50 mg/dL    Triglycerides 128 <150 mg/dL    SL AMB LDL-CHOLESTEROL 129 (H) mg/dL (calc)    SL AMB CHOL/HDLC RATIO 3 5 <5 0 (calc)    SL AMB NON HDL CHOLESTEROL 153 (H) <130 mg/dL (calc)   Comprehensive metabolic panel   Result Value Ref Range    SL AMB GLUCOSE 104 (H) 65 - 99 mg/dL    BUN 18 7 - 25 mg/dL    Creatinine, Serum 0 96 0 50 - 0 99 mg/dL    eGFR Non  62 > OR = 60 mL/min/1 73m2    SL AMB EGFR  72 > OR = 60 mL/min/1 73m2    SL AMB BUN/CREATININE RATIO NOT APPLICABLE 6 - 22 (calc)    SL AMB SODIUM 141 135 - 146 mmol/L    SL AMB POTASSIUM 4 3 3 5 - 5 3 mmol/L    SL AMB CHLORIDE 103 98 - 110 mmol/L    SL AMB CARBON DIOXIDE 30 20 - 31 mmol/L    SL AMB CALCIUM 9 5 8 6 - 10 4 mg/dL    SL AMB PROTEIN, TOTAL 7 1 6 1 - 8 1 g/dL    Serum Albumin 4 2 3 6 - 5 1 g/dL    SL AMB GLOBULIN 2 9 1 9 - 3 7 g/dL (calc)    SL AMB ALBUMIN/GLOBULIN RATIO 1 4 1 0 - 2 5 (calc)    SL AMB BILIRUBIN, TOTAL 0 5 0 2 - 1 2 mg/dL    SL AMB ALKALINE PHOSPHATASE 77 33 - 130 U/L    SL AMB AST 17 10 - 35 U/L    SL AMB ALT 21 6 - 29 U/L   Hemoglobin A1c   Result Value Ref Range    Hemoglobin A1C 5 7 (H) <5 7 % of total Hgb       Orders Placed This Encounter   Procedures    HEMOGLOBIN A1C W/ EAG ESTIMATION    Basic metabolic panel    Microalbumin / creatinine urine ratio    Ambulatory referral to Gastroenterology       ALLERGIES:  Allergies   Allergen Reactions    Sulfa Antibiotics Myalgia     Action Taken: joint pain and stiffness;        Current Medications     Current Outpatient Prescriptions   Medication Sig Dispense Refill    amLODIPine (NORVASC) 10 mg tablet Take 1 tablet by mouth daily      Cholecalciferol (VITAMIN D3) 1000 units CAPS Take by mouth      fluticasone (FLONASE) 50 mcg/act nasal spray 2 sprays into each nostril daily      Lactobacillus (PROBIOTIC ACIDOPHILUS) CAPS Take 1 tablet daily as supplement   lisinopril (ZESTRIL) 40 mg tablet Take 1 tablet (40 mg total) by mouth daily 90 tablet 3    loratadine (CLARITIN) 10 mg tablet Take 1 tablet by mouth daily as needed      metoprolol succinate (TOPROL-XL) 25 mg 24 hr tablet Take 1 tablet (25 mg total) by mouth daily 90 tablet 3    repaglinide (PRANDIN) 0 5 mg tablet Take 1 tablet (0 5 mg total) by mouth every 8 (eight) hours 180 tablet 2     No current facility-administered medications for this visit            Health Maintenance     Health Maintenance   Topic Date Due    HIV SCREENING  1955    Hepatitis C Screening  1955    COLONOSCOPY  1955    Diabetic Foot Exam  10/26/1963    OPHTHALMOLOGY EXAM  10/26/1963    URINE MICROALBUMIN  10/26/1963    DTaP,Tdap,and Td Vaccines (1 - Tdap) 10/26/1974    Depression Screening PHQ-9 02/16/2019    INFLUENZA VACCINE  Completed     Immunization History   Administered Date(s) Administered    Influenza Quadrivalent Preservative Free 3 years and older IM 10/04/2017    Influenza TIV (IM) 09/01/2014, 01/05/2016, 10/26/2016    Pneumococcal Polysaccharide PPV23 07/12/2016    Zoster 01/01/2014, 08/30/2016       Fredis Mehta MD

## 2018-04-24 NOTE — ASSESSMENT & PLAN NOTE
Recent A1c is noted to be 5 7  I have asked patient to lower her frequency of prandin however patient would like to continue with her current regimen which is twice daily with meals  Will provide  Blood work Rx to recheck A1c in 4 months  Continue with lifestyle modifications  Up-to-date with ophthalmology and foot exams

## 2018-04-24 NOTE — ASSESSMENT & PLAN NOTE
Patient will schedule mammogram which is ordered by gyn  Referral to GI provided for screening colonoscopy as patient is due

## 2018-05-23 ENCOUNTER — HOSPITAL ENCOUNTER (OUTPATIENT)
Dept: BONE DENSITY | Facility: IMAGING CENTER | Age: 63
Discharge: HOME | End: 2018-05-23
Payer: COMMERCIAL

## 2018-05-23 DIAGNOSIS — N39.3 STRESS INCONTINENCE: ICD-10-CM

## 2018-05-23 PROCEDURE — 77067 SCR MAMMO BI INCL CAD: CPT

## 2018-06-18 DIAGNOSIS — I10 ESSENTIAL HYPERTENSION: ICD-10-CM

## 2018-06-18 RX ORDER — METOPROLOL SUCCINATE 25 MG/1
25 TABLET, EXTENDED RELEASE ORAL DAILY
Qty: 90 TABLET | Refills: 1 | Status: SHIPPED | OUTPATIENT
Start: 2018-06-18 | End: 2018-08-27 | Stop reason: ALTCHOICE

## 2018-07-16 ENCOUNTER — TELEPHONE (OUTPATIENT)
Dept: FAMILY MEDICINE CLINIC | Facility: CLINIC | Age: 63
End: 2018-07-16

## 2018-07-16 NOTE — TELEPHONE ENCOUNTER
RE: TDAP injection    She applied to volunteer at [facility] 66 sheI has pw stating she has to contact her PCP to see if she can get the TDAP   Please call

## 2018-07-17 ENCOUNTER — CLINICAL SUPPORT (OUTPATIENT)
Dept: FAMILY MEDICINE CLINIC | Facility: CLINIC | Age: 63
End: 2018-07-17
Payer: COMMERCIAL

## 2018-07-17 DIAGNOSIS — Z23 NEED FOR TDAP VACCINATION: Primary | ICD-10-CM

## 2018-07-17 PROCEDURE — 90715 TDAP VACCINE 7 YRS/> IM: CPT

## 2018-07-17 PROCEDURE — 90471 IMMUNIZATION ADMIN: CPT

## 2018-07-18 LAB
LEFT EYE DIABETIC RETINOPATHY: NORMAL
RIGHT EYE DIABETIC RETINOPATHY: NORMAL

## 2018-07-18 PROCEDURE — 3072F LOW RISK FOR RETINOPATHY: CPT | Performed by: NURSE PRACTITIONER

## 2018-07-19 ENCOUNTER — APPOINTMENT (OUTPATIENT)
Dept: LAB | Facility: HOSPITAL | Age: 63
End: 2018-07-19

## 2018-07-19 ENCOUNTER — TRANSCRIBE ORDERS (OUTPATIENT)
Dept: ADMINISTRATIVE | Facility: HOSPITAL | Age: 63
End: 2018-07-19

## 2018-07-19 DIAGNOSIS — Z11.1 SCREENING EXAMINATION FOR PULMONARY TUBERCULOSIS: ICD-10-CM

## 2018-07-19 DIAGNOSIS — Z01.84 IMMUNITY STATUS TESTING: ICD-10-CM

## 2018-07-19 DIAGNOSIS — Z01.84 IMMUNITY STATUS TESTING: Primary | ICD-10-CM

## 2018-07-19 LAB — RUBV IGG SERPL IA-ACNC: >175 IU/ML

## 2018-07-19 PROCEDURE — 86735 MUMPS ANTIBODY: CPT

## 2018-07-19 PROCEDURE — 86787 VARICELLA-ZOSTER ANTIBODY: CPT

## 2018-07-19 PROCEDURE — 86765 RUBEOLA ANTIBODY: CPT

## 2018-07-19 PROCEDURE — 86762 RUBELLA ANTIBODY: CPT

## 2018-07-19 PROCEDURE — 86480 TB TEST CELL IMMUN MEASURE: CPT

## 2018-07-19 PROCEDURE — 36415 COLL VENOUS BLD VENIPUNCTURE: CPT

## 2018-07-22 LAB
ANNOTATION COMMENT IMP: NORMAL
GAMMA INTERFERON BACKGROUND BLD IA-ACNC: 0.03 IU/ML
M TB IFN-G BLD-IMP: NEGATIVE
M TB IFN-G CD4+ BCKGRND COR BLD-ACNC: <0.01 IU/ML
M TB IFN-G CD4+ T-CELLS BLD-ACNC: 0.02 IU/ML
MITOGEN IGNF BLD-ACNC: 8.16 IU/ML
QUANTIFERON-TB GOLD IN TUBE: NORMAL
SERVICE CMNT-IMP: NORMAL

## 2018-07-24 LAB
MEV IGG SER QL: NORMAL
MUV IGG SER QL: NORMAL
VZV IGG SER IA-ACNC: NORMAL

## 2018-08-15 ENCOUNTER — OFFICE VISIT (OUTPATIENT)
Dept: FAMILY MEDICINE CLINIC | Facility: CLINIC | Age: 63
End: 2018-08-15
Payer: COMMERCIAL

## 2018-08-15 VITALS
TEMPERATURE: 99.4 F | RESPIRATION RATE: 16 BRPM | HEART RATE: 84 BPM | HEIGHT: 61 IN | WEIGHT: 178 LBS | DIASTOLIC BLOOD PRESSURE: 80 MMHG | BODY MASS INDEX: 33.61 KG/M2 | SYSTOLIC BLOOD PRESSURE: 140 MMHG

## 2018-08-15 DIAGNOSIS — J06.9 UPPER RESPIRATORY TRACT INFECTION, UNSPECIFIED TYPE: ICD-10-CM

## 2018-08-15 DIAGNOSIS — J32.9 SINUSITIS, UNSPECIFIED CHRONICITY, UNSPECIFIED LOCATION: Primary | ICD-10-CM

## 2018-08-15 PROCEDURE — 99213 OFFICE O/P EST LOW 20 MIN: CPT | Performed by: FAMILY MEDICINE

## 2018-08-15 RX ORDER — AMOXICILLIN 875 MG/1
875 TABLET, COATED ORAL 2 TIMES DAILY
Qty: 14 TABLET | Refills: 0 | Status: SHIPPED | OUTPATIENT
Start: 2018-08-15 | End: 2018-08-27 | Stop reason: ALTCHOICE

## 2018-08-15 NOTE — PROGRESS NOTES
FAMILY PRACTICE OFFICE VISIT       NAME: Quiana Lantigua  AGE: 57 y o  SEX: female       : 1955        MRN: [id number]    DATE: 2018  TIME: 2:59 PM    Assessment and Plan     Problem List Items Addressed This Visit     Sinusitis - Primary    Relevant Medications    amoxicillin (AMOXIL) 875 mg tablet    Upper respiratory tract infection    Relevant Medications    amoxicillin (AMOXIL) 875 mg tablet          Patient presents with symptoms that is consistent with sinusitis and upper respiratory infection, likely viral in etiology  I would like patient to start nasal saline irrigation  Continue with symptomatic treatment and supportive measures including adequate hydration  If symptoms persist or worsen, she may fill her printed Rx amoxicillin  Return parameters discussed  There are no Patient Instructions on file for this visit  Chief Complaint     Chief Complaint   Patient presents with    Cough     Patient is here due to coughing,sinus pressure, night sweats since yesterday  History of Present Illness     HPI    67-year-old female presents with sinus pressure, nasal congestion, pnd, runny nose, cough on and off since yesterday  Went away for the weekend for family reunions,  May have been exposed to sick contacts  Woke up yesterday morning, thought she had allergies, took allergy meds, corcidin-d, pepcid  Took motrin at 2 am    Review of Systems   Review of Systems   Constitutional: Negative for fever  HENT: Positive for congestion, rhinorrhea and sinus pressure  Respiratory: Positive for cough  Negative for shortness of breath  Active Problem List     Patient Active Problem List   Diagnosis    Benign essential hypertension    Dyslipidemia    Type 2 diabetes mellitus ([address] )    Osteoarthritis of both knees    Obstructive sleep apnea    Obesity (BMI 30-39  9)    Hypovitaminosis D    Hemorrhoid    Hypertension    Hyperlipidemia    Routine health maintenance  Sinusitis    Upper respiratory tract infection       Past Medical History:  Past Medical History:   Diagnosis Date    Adhesive capsulitis of right shoulder     LAST ASSESSED 10/12/18     2 para 2     Hypertension     Nephrolithiasis     Prediabetes     LAST ASSESSED 17       Past Surgical History:  Past Surgical History:   Procedure Laterality Date    ABDOMINAL HYSTERECTOMY      CATARACT EXTRACTION      GALLBLADDER SURGERY      TONSILLECTOMY         Family History:  Family History   Problem Relation Age of Onset    Alcohol abuse Mother     Hypertension Mother     Stroke Mother     Breast cancer Sister        Social History:  Social History     Social History    Marital status: /Civil Union     Spouse name: N/A    Number of children: 2    Years of education: N/A     Occupational History    RETIRED      Social History Main Topics    Smoking status: Never Smoker    Smokeless tobacco: Never Used    Alcohol use No    Drug use: No    Sexual activity: Not on file     Other Topics Concern    Not on file     Social History Narrative    CAFFEINE USE     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         I have reviewed the patient's medical history in detail; there are no changes to the history as noted in the electronic medical record  Objective     Vitals:    08/15/18 1003   BP: 140/80   Pulse: 84   Resp: 16   Temp: 99 4 °F (37 4 °C)     Wt Readings from Last 3 Encounters:   08/15/18 80 7 kg (178 lb)   18 80 3 kg (177 lb)   18 79 3 kg (174 lb 12 8 oz)       Physical Exam   Constitutional: She appears well-developed and well-nourished  HENT:   Head: Normocephalic and atraumatic  Mouth/Throat: Oropharynx is clear and moist     TMs intact and clear  Ears with edema noted  Posterior oropharynx with erythema as well as drainage noted  Eyes: Conjunctivae and EOM are normal  Pupils are equal, round, and reactive to light  Neck: Normal range of motion  Neck supple  Cardiovascular: Normal rate, regular rhythm and normal heart sounds  Pulmonary/Chest: Effort normal and breath sounds normal    Musculoskeletal: Normal range of motion  Lymphadenopathy:     She has no cervical adenopathy  Nursing note and vitals reviewed  Pertinent Laboratory/Diagnostic Studies:  Lab Results   Component Value Date    GLUCOSE 101 02/12/2014    BUN 18 04/16/2018    CREATININE 0 96 04/16/2018    CALCIUM 9 5 04/16/2018     01/11/2018    K 4 0 01/11/2018    CO2 30 01/11/2018     01/11/2018     Lab Results   Component Value Date    ALT 20 01/11/2018    AST 16 01/11/2018    ALKPHOS 77 04/16/2018    BILITOT 0 5 01/11/2018       Lab Results   Component Value Date    WBC 6 0 01/11/2018    HGB 14 7 01/11/2018    HCT 44 0 01/11/2018    MCV 90 2 01/11/2018     01/11/2018       No results found for: TSH    Lab Results   Component Value Date    CHOL 193 01/11/2018     Lab Results   Component Value Date    TRIG 128 04/16/2018     Lab Results   Component Value Date    HDL 61 04/16/2018     No results found for: [facility]  Lab Results   Component Value Date    HGBA1C 5 7 (H) 04/16/2018       Results for orders placed or performed in visit on 07/19/18   Rubeola antibody IgG   Result Value Ref Range    Rubeola IgG IMMUNE IMMUNE   Mumps antibody, IgG   Result Value Ref Range    Mumps IgG IMMUNE IMMUNE   Rubella antibody, IgG   Result Value Ref Range    Rubella IgG Quant >175 0 >9 9 IU/mL   Varicella zoster antibody, IgG   Result Value Ref Range    Varicella IgG IMMUNE IMMUNE   Quantiferon TB Gold   Result Value Ref Range    QuantiFERON-TB Gold In Tube       Incubated, specimen forwarded to [facility] [place] for  completion of the assay     QuantiFERON TB In Tube-Reflex   Result Value Ref Range    QuantiFERON TB Gold Negative Negative    QuantiFERON Criteria Comment     QuantiFERON TB Ag Value 0 02 IU/mL    QuantiFERON Nil Value 0 03 IU/mL    QuantiFERON Mitogen value 8 16 IU/mL    QFT TB Ag minus Nil Value <0 01 IU/mL    QFT Interpretation Comment        No orders of the defined types were placed in this encounter  ALLERGIES:  Allergies   Allergen Reactions    Sulfa Antibiotics Myalgia     Other reaction(s): severe joint pain  Action Taken: joint pain and stiffness;     Sulfamethopyrazine      Joint stiffness    Sulfamethoxazole-Trimethoprim Other (See Comments)     Joint pain       Current Medications     Current Outpatient Prescriptions   Medication Sig Dispense Refill    amLODIPine (NORVASC) 10 mg tablet Take 1 tablet by mouth daily      Cholecalciferol (VITAMIN D3) 1000 units CAPS Take by mouth      fluticasone (FLONASE) 50 mcg/act nasal spray 2 sprays into each nostril daily      Lactobacillus (PROBIOTIC ACIDOPHILUS) CAPS Take 1 tablet daily as supplement   lisinopril (ZESTRIL) 40 mg tablet Take 1 tablet (40 mg total) by mouth daily 90 tablet 3    loratadine (CLARITIN) 10 mg tablet Take 1 tablet by mouth daily as needed      metoprolol succinate (TOPROL-XL) 25 mg 24 hr tablet Take 1 tablet (25 mg total) by mouth daily 90 tablet 1    repaglinide (PRANDIN) 0 5 mg tablet Take 1 tablet (0 5 mg total) by mouth every 8 (eight) hours 180 tablet 2    amoxicillin (AMOXIL) 875 mg tablet Take 1 tablet (875 mg total) by mouth 2 (two) times a day for 7 days 14 tablet 0     No current facility-administered medications for this visit            Health Maintenance     Health Maintenance   Topic Date Due    HIV SCREENING  1955    Hepatitis C Screening  1955    CRC Screening: Colonoscopy  1955    Diabetic Foot Exam  10/26/1963    DM Eye Exam  10/26/1963    Pneumococcal PPSV23 Medium Risk Adult (1 of 1 - PPSV23) 10/26/1972    INFLUENZA VACCINE  09/01/2018    HEMOGLOBIN A1C  10/16/2018    Depression Screening PHQ-9  08/15/2019    DTaP,Tdap,and Td Vaccines (2 - Td) 07/17/2028     Immunization History   Administered Date(s) Administered    Influenza Quadrivalent Preservative Free 3 years and older IM 10/04/2017    Influenza TIV (IM) 09/01/2014, 01/05/2016, 10/26/2016    Pneumococcal Polysaccharide PPV23 07/12/2016    Tdap 07/17/2018    Zoster 01/01/2014, 08/30/2016       Ginna Ng MD

## 2018-08-16 PROBLEM — J32.9 SINUSITIS: Status: ACTIVE | Noted: 2018-08-16

## 2018-08-16 PROBLEM — J06.9 UPPER RESPIRATORY TRACT INFECTION: Status: ACTIVE | Noted: 2018-08-16

## 2018-08-24 LAB
ALBUMIN/CREAT UR: 12 MCG/MG CREAT
BUN SERPL-MCNC: 14 MG/DL (ref 7–25)
BUN/CREAT SERPL: ABNORMAL (CALC) (ref 6–22)
CALCIUM SERPL-MCNC: 9.4 MG/DL (ref 8.6–10.4)
CHLORIDE SERPL-SCNC: 104 MMOL/L (ref 98–110)
CO2 SERPL-SCNC: 30 MMOL/L (ref 20–32)
CREAT SERPL-MCNC: 0.92 MG/DL (ref 0.5–0.99)
CREAT UR-MCNC: 104 MG/DL (ref 20–320)
EST. AVERAGE GLUCOSE BLD GHB EST-MCNC: 117 (CALC)
EST. AVERAGE GLUCOSE BLD GHB EST-SCNC: 6.5 (CALC)
GLUCOSE SERPL-MCNC: 113 MG/DL (ref 65–99)
HBA1C MFR BLD: 5.7 % OF TOTAL HGB
MICROALBUMIN UR-MCNC: 1.2 MG/DL
POTASSIUM SERPL-SCNC: 4.4 MMOL/L (ref 3.5–5.3)
SL AMB EGFR AFRICAN AMERICAN: 76 ML/MIN/1.73M2
SL AMB EGFR NON AFRICAN AMERICAN: 66 ML/MIN/1.73M2
SODIUM SERPL-SCNC: 142 MMOL/L (ref 135–146)

## 2018-08-27 ENCOUNTER — OFFICE VISIT (OUTPATIENT)
Dept: FAMILY MEDICINE CLINIC | Facility: CLINIC | Age: 63
End: 2018-08-27
Payer: COMMERCIAL

## 2018-08-27 VITALS
SYSTOLIC BLOOD PRESSURE: 144 MMHG | BODY MASS INDEX: 33.15 KG/M2 | RESPIRATION RATE: 16 BRPM | DIASTOLIC BLOOD PRESSURE: 82 MMHG | WEIGHT: 175.6 LBS | HEIGHT: 61 IN | HEART RATE: 72 BPM | TEMPERATURE: 96.9 F

## 2018-08-27 DIAGNOSIS — E78.5 DYSLIPIDEMIA: ICD-10-CM

## 2018-08-27 DIAGNOSIS — E55.9 HYPOVITAMINOSIS D: ICD-10-CM

## 2018-08-27 DIAGNOSIS — J01.90 ACUTE NON-RECURRENT SINUSITIS, UNSPECIFIED LOCATION: ICD-10-CM

## 2018-08-27 DIAGNOSIS — I10 BENIGN ESSENTIAL HYPERTENSION: ICD-10-CM

## 2018-08-27 DIAGNOSIS — E11.9 TYPE 2 DIABETES MELLITUS WITHOUT COMPLICATION, WITHOUT LONG-TERM CURRENT USE OF INSULIN (HCC): Primary | ICD-10-CM

## 2018-08-27 DIAGNOSIS — G47.33 OBSTRUCTIVE SLEEP APNEA: ICD-10-CM

## 2018-08-27 PROBLEM — J06.9 UPPER RESPIRATORY TRACT INFECTION: Status: RESOLVED | Noted: 2018-08-16 | Resolved: 2018-08-27

## 2018-08-27 PROBLEM — Z00.00 ROUTINE HEALTH MAINTENANCE: Status: RESOLVED | Noted: 2018-04-23 | Resolved: 2018-08-27

## 2018-08-27 PROBLEM — J32.9 SINUSITIS: Status: RESOLVED | Noted: 2018-08-16 | Resolved: 2018-08-27

## 2018-08-27 PROCEDURE — 99214 OFFICE O/P EST MOD 30 MIN: CPT | Performed by: NURSE PRACTITIONER

## 2018-08-27 PROCEDURE — 3008F BODY MASS INDEX DOCD: CPT | Performed by: NURSE PRACTITIONER

## 2018-08-27 RX ORDER — METOPROLOL SUCCINATE 50 MG/1
50 TABLET, EXTENDED RELEASE ORAL DAILY
Qty: 90 TABLET | Refills: 1 | Status: SHIPPED | OUTPATIENT
Start: 2018-08-27 | End: 2018-11-19 | Stop reason: SDUPTHER

## 2018-08-27 RX ORDER — REPAGLINIDE 0.5 MG/1
0.5 TABLET ORAL DAILY
Qty: 180 TABLET | Refills: 0
Start: 2018-08-27 | End: 2019-04-11 | Stop reason: SDUPTHER

## 2018-08-27 RX ORDER — DOXYCYCLINE 100 MG/1
100 CAPSULE ORAL 2 TIMES DAILY
Qty: 14 CAPSULE | Refills: 0 | Status: SHIPPED | OUTPATIENT
Start: 2018-08-27 | End: 2018-09-03

## 2018-08-27 NOTE — PROGRESS NOTES
FAMILY PRACTICE OFFICE VISIT       NAME: Jody Hill  AGE: 57 y o  SEX: female       : 1955        MRN: [id number]    DATE: 2018    Assessment and Plan     Problem List Items Addressed This Visit     Benign essential hypertension    Relevant Medications    metoprolol succinate (TOPROL-XL) 50 mg 24 hr tablet    Other Relevant Orders    Comprehensive metabolic panel    CBC    Hemoglobin A1C    Lipid panel    TSH, 3rd generation    Dyslipidemia    Relevant Orders    Comprehensive metabolic panel    CBC    Hemoglobin A1C    Lipid panel    TSH, 3rd generation    Type 2 diabetes mellitus (HCC) - Primary    Relevant Medications    repaglinide (PRANDIN) 0 5 mg tablet    Other Relevant Orders    Comprehensive metabolic panel    CBC    Hemoglobin A1C    Lipid panel    TSH, 3rd generation    Obstructive sleep apnea    Hypovitaminosis D    Relevant Orders    Vitamin D 25 hydroxy      Other Visit Diagnoses     Acute non-recurrent sinusitis, unspecified location        Relevant Medications    doxycycline monohydrate (MONODOX) 100 mg capsule            1  Type 2 diabetes mellitus without complication, without long-term current use of insulin (HCC)  A1C 5 7%  Will reduce Prandin to once daily, with her largest meal of the day  Will repeat A1C in December  Foot exam up to date  Eye exam is up to date, will attempt to get copy of eye exam, from Dr Rosy Agudelo  She will continue to work on lifestyle modification  Comprehensive metabolic panel    CBC    Hemoglobin A1C    Lipid panel    TSH, 3rd generation    repaglinide (PRANDIN) 0 5 mg tablet   2  Benign essential hypertension  BP not quite at goal, <130/80  Will increase metoprolol Xl to 50 mg daily, continue lisinopril 40 mg and amlodipine 10 mg daily  She has tried taking baby aspirin daily, but it caused significant bruising  Will repeat routine blood work and follow up in December     Comprehensive metabolic panel    CBC    Hemoglobin A1C    Lipid panel TSH, 3rd generation    metoprolol succinate (TOPROL-XL) 50 mg 24 hr tablet   3  Dyslipidemia  She has stopped taking Atorvastatin due to muscle cramping  She is taking Flaxseed oil 1000 mg daily  She will add red yeast rice 600 mg twice daily  Will repeat routine blood work and follow up in December  Comprehensive metabolic panel    CBC    Hemoglobin A1C    Lipid panel    TSH, 3rd generation   4  Hypovitaminosis D  Continue Vitamin D 1000 units daily  Repeat vitamin d level in December  Vitamin D 25 hydroxy   5  Obstructive sleep apnea  Cannot tolerate nasal CPAP, states she tried it for 1 year  Declines further evaluation by sleep medicine  6  Acute non-recurrent sinusitis, unspecified location  Recommend treatment with doxycycline 100 mg twice daily for 7 days  Continue loratadine and flonase  If symptoms are worsening or not improving, she will call  doxycycline monohydrate (MONODOX) 100 mg capsule           Chief Complaint     Chief Complaint   Patient presents with    Follow-up     Pt is here for blood work results, sore throat and left ear ache       History of Present Illness     John Maza is a 57year old patient who presents today for follow up visit  She was treated with a course of Amoxicillin approximately 2 weeks ago for sinusitis  She started to feel a little better last week, but now is feeling poorly again  She has cough, fatigue, congestion, sore throat, and left ear pain  She is taking loratadine, flonase, and Delsym with no relief  She has brought her BP log today for the past 1 week  8//77  8//79  8//69  8//73  8//70  8//67  8//77  8//63    She has stopped drinking iced tea  She has found a sparking water she likes in place of iced tea  She does continue to drink 2 cups of coffee every morning  Tries to get 10,000 steps per day  Review of Systems   Review of Systems   Constitutional: Positive for fatigue   Negative for chills and fever  HENT: Positive for congestion, ear pain, postnasal drip and sore throat  Respiratory: Positive for cough  Negative for chest tightness, shortness of breath and wheezing  Cardiovascular: Negative for chest pain, palpitations and leg swelling  Gastrointestinal: Negative for diarrhea, nausea and vomiting  Genitourinary: Negative  Musculoskeletal: Negative  Skin: Negative  Neurological: Positive for dizziness (since being ill)  Negative for weakness, light-headedness and headaches  Hematological: Negative  Psychiatric/Behavioral: Negative  Active Problem List     Patient Active Problem List   Diagnosis    Benign essential hypertension    Dyslipidemia    Type 2 diabetes mellitus ([facility] [address] )    Osteoarthritis of both knees    Obstructive sleep apnea    Obesity (BMI 30-39  9)    Hypovitaminosis D    Hemorrhoid    Hyperlipidemia       Past Medical History:  Past Medical History:   Diagnosis Date    Adhesive capsulitis of right shoulder     LAST ASSESSED 10/12/18     2 para 2     Hypertension     Nephrolithiasis     Prediabetes     LAST ASSESSED 17       Past Surgical History:  Past Surgical History:   Procedure Laterality Date    ABDOMINAL HYSTERECTOMY      CATARACT EXTRACTION      GALLBLADDER SURGERY      TONSILLECTOMY         Family History:  Family History   Problem Relation Age of Onset    Alcohol abuse Mother     Hypertension Mother     Stroke Mother     Breast cancer Sister        Social History:  Social History     Social History    Marital status: /Civil Union     Spouse name: N/A    Number of children: 2    Years of education: N/A     Occupational History    RETIRED      Social History Main Topics    Smoking status: Never Smoker    Smokeless tobacco: Never Used    Alcohol use No    Drug use: No    Sexual activity: Not on file     Other Topics Concern    Not on file     Social History Narrative    CAFFEINE USE     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           I have reviewed the patient's medical history in detail; there are no changes to the history as noted in the electronic medical record  Objective     Vitals:    08/27/18 0905 08/27/18 1001   BP: 150/90 144/82   Pulse: 72    Resp: 16    Temp: (!) 96 9 °F (36 1 °C)    TempSrc: Tympanic    Weight: 79 7 kg (175 lb 9 6 oz)    Height: 5' 1" (1 549 m)      Wt Readings from Last 3 Encounters:   08/27/18 79 7 kg (175 lb 9 6 oz)   08/15/18 80 7 kg (178 lb)   04/23/18 80 3 kg (177 lb)     Body mass index is 33 18 kg/m²  Physical Exam   Constitutional: She is oriented to person, place, and time  She appears well-developed and well-nourished  HENT:   Head: Normocephalic and atraumatic  Right Ear: Tympanic membrane and ear canal normal    Left Ear: Tympanic membrane and ear canal normal    Nose: Mucosal edema present  Mouth/Throat: Posterior oropharyngeal erythema present  No oropharyngeal exudate or posterior oropharyngeal edema  Eyes: Conjunctivae and EOM are normal  Pupils are equal, round, and reactive to light  Neck: Normal range of motion  Neck supple  No thyromegaly present  Cardiovascular: Normal rate and regular rhythm  No murmur heard  Pulmonary/Chest: Effort normal and breath sounds normal    Abdominal: Soft  Bowel sounds are normal    Musculoskeletal: Normal range of motion  Lymphadenopathy:     She has cervical adenopathy (bilateral submandibular adenopathy)  Neurological: She is alert and oriented to person, place, and time  Skin: No rash noted  Psychiatric: She has a normal mood and affect  Nursing note and vitals reviewed        ALLERGIES:  Allergies   Allergen Reactions    Sulfa Antibiotics Myalgia     Other reaction(s): severe joint pain  Action Taken: joint pain and stiffness;     Sulfamethopyrazine      Joint stiffness    Sulfamethoxazole-Trimethoprim Other (See Comments)     Joint pain       Current Medications     Current Outpatient Prescriptions   Medication Sig Dispense Refill    amLODIPine (NORVASC) 10 mg tablet Take 1 tablet by mouth daily      Cholecalciferol (VITAMIN D3) 1000 units CAPS Take by mouth      fluticasone (FLONASE) 50 mcg/act nasal spray 2 sprays into each nostril daily      Lactobacillus (PROBIOTIC ACIDOPHILUS) CAPS Take 1 tablet daily as supplement   lisinopril (ZESTRIL) 40 mg tablet Take 1 tablet (40 mg total) by mouth daily 90 tablet 3    loratadine (CLARITIN) 10 mg tablet Take 1 tablet by mouth daily as needed      repaglinide (PRANDIN) 0 5 mg tablet Take 1 tablet (0 5 mg total) by mouth daily 180 tablet 0    doxycycline monohydrate (MONODOX) 100 mg capsule Take 1 capsule (100 mg total) by mouth 2 (two) times a day for 7 days 14 capsule 0    metoprolol succinate (TOPROL-XL) 50 mg 24 hr tablet Take 1 tablet (50 mg total) by mouth daily 90 tablet 1     No current facility-administered medications for this visit            Health Maintenance     Health Maintenance   Topic Date Due    Diabetic Foot Exam  10/26/1963    DM Eye Exam  10/26/1963    INFLUENZA VACCINE  09/01/2018    HEMOGLOBIN A1C  02/23/2019    Depression Screening PHQ-9  08/15/2019    CRC Screening: Colonoscopy  09/15/2025    DTaP,Tdap,and Td Vaccines (2 - Td) 07/17/2028    Pneumococcal PPSV23 Medium Risk Adult  Completed     Immunization History   Administered Date(s) Administered    Influenza Quadrivalent Preservative Free 3 years and older IM 10/04/2017    Influenza TIV (IM) 09/01/2014, 01/05/2016, 10/26/2016    Pneumococcal Polysaccharide PPV23 07/12/2016    Tdap 07/17/2018    Zoster 01/01/2014, 08/30/2016       JOSHUA Morton

## 2018-08-28 DIAGNOSIS — E78.5 HYPERLIPIDEMIA, UNSPECIFIED HYPERLIPIDEMIA TYPE: Primary | ICD-10-CM

## 2018-08-28 RX ORDER — DIMENHYDRINATE 50 MG
1 TABLET ORAL DAILY
Qty: 30 CAPSULE | Refills: 0
Start: 2018-08-28 | End: 2019-06-28 | Stop reason: ALTCHOICE

## 2018-08-28 RX ORDER — AMPICILLIN TRIHYDRATE 250 MG
1 CAPSULE ORAL 2 TIMES DAILY
Qty: 60 CAPSULE | Refills: 0
Start: 2018-08-28 | End: 2019-03-25

## 2018-10-05 ENCOUNTER — ANNUAL EXAM (OUTPATIENT)
Dept: OBGYN CLINIC | Facility: CLINIC | Age: 63
End: 2018-10-05
Payer: COMMERCIAL

## 2018-10-05 VITALS
WEIGHT: 180.8 LBS | DIASTOLIC BLOOD PRESSURE: 80 MMHG | SYSTOLIC BLOOD PRESSURE: 150 MMHG | BODY MASS INDEX: 34.14 KG/M2 | HEIGHT: 61 IN

## 2018-10-05 DIAGNOSIS — Z12.31 ENCOUNTER FOR SCREENING MAMMOGRAM FOR MALIGNANT NEOPLASM OF BREAST: ICD-10-CM

## 2018-10-05 DIAGNOSIS — E66.9 OBESITY (BMI 30-39.9): ICD-10-CM

## 2018-10-05 DIAGNOSIS — Z01.419 ENCOUNTER FOR GYNECOLOGICAL EXAMINATION WITHOUT ABNORMAL FINDING: Primary | ICD-10-CM

## 2018-10-05 PROCEDURE — 99396 PREV VISIT EST AGE 40-64: CPT | Performed by: OBSTETRICS & GYNECOLOGY

## 2018-10-05 RX ORDER — FAMOTIDINE 10 MG
10 TABLET ORAL DAILY PRN
COMMUNITY

## 2018-10-05 NOTE — PROGRESS NOTES
CC:  Annual exam    HPI: Giulia Fuller presents for routine gyn exam   The patient offers no complaints or concerns at this time  She did admit to having eaten a lot of fast food on a recent vacation  Given her current BMI status she was advised not to do so in the future  She was also advised to watch her salt intake given her history of hypertension  Past Medical History:  Past Medical History:   Diagnosis Date    Adhesive capsulitis of right shoulder     LAST ASSESSED 10/12/18     2 para 2     Hypertension     Nephrolithiasis     Prediabetes     LAST ASSESSED 17       Past Surgical History:  Past Surgical History:   Procedure Laterality Date    ABDOMINAL HYSTERECTOMY      CATARACT EXTRACTION      GALLBLADDER SURGERY      TONSILLECTOMY         Past OB/Gyn History:    Patient is menopausal   Denies any history of sexually transmitted infection  No history of abnormal pap smears       ALLERGIES:   Allergies   Allergen Reactions    Sulfa Antibiotics Myalgia     Other reaction(s): severe joint pain  Action Taken: joint pain and stiffness;     Sulfamethopyrazine      Joint stiffness    Sulfamethoxazole-Trimethoprim Other (See Comments)     Joint pain       MEDS:   Current Outpatient Prescriptions:     amLODIPine (NORVASC) 10 mg tablet    Cholecalciferol (VITAMIN D3) 1000 units CAPS    famotidine (PEPCID) 10 mg tablet    Flaxseed, Linseed, (FLAX SEED OIL) 1000 MG CAPS    fluticasone (FLONASE) 50 mcg/act nasal spray    Lactobacillus (PROBIOTIC ACIDOPHILUS) CAPS    lisinopril (ZESTRIL) 40 mg tablet    loratadine (CLARITIN) 10 mg tablet    metoprolol succinate (TOPROL-XL) 50 mg 24 hr tablet    Red Yeast Rice 600 MG CAPS    repaglinide (PRANDIN) 0 5 mg tablet    Family History:  Family History   Problem Relation Age of Onset    Alcohol abuse Mother     Hypertension Mother     Stroke Mother     Breast cancer Sister        Social History:  Social History     Social History    Marital status: /Civil Union     Spouse name: N/A    Number of children: 2    Years of education: N/A     Occupational History    RETIRED      Social History Main Topics    Smoking status: Never Smoker    Smokeless tobacco: Never Used    Alcohol use No    Drug use: No    Sexual activity: Not Currently     Other Topics Concern    Not on file     Social History Narrative    CAFFEINE USE     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             Review of Systems:  Gen:   Denies fatigue, chills, nausea, vomiting, fever  Skin: No rashes or discolorations of any concern  RESP: Denies SOB, no cough  CV: Denies chest pain or palpitations  Breasts: Denies masses, pain, skin changes and nipple discharge  GI: Denies abdominal pain, heartburn, nausea, vomiting, changes in bowel habits  : Denies dysuria, frequency, CVA tenderness, incontinence and hematuria  Genitalia: Denies abnormal vaginal discharge, external lesions, rashes, pelvic pain, pressure, abnormal bleeding  Rectal:  Denies pain, bleeding, hemorrhoids,    Physical Exam:  /80 (BP Location: Left arm, Patient Position: Sitting, Cuff Size: Standard)   Ht 5' 1" (1 549 m)   Wt 82 kg (180 lb 12 8 oz)   Breastfeeding? No   BMI 34 16 kg/m²    Gen: The patient was alert and oriented x3, pleasant well-appearing female in no acute distress  Neck:  Unremarkable, no anterior or posterior lymphadenopathy, no thyromegaly  Breasts: Symmetric  No dominant, discrete, fixed  or suspicious masses are noted  No skin or nipple changes  No palpable axillary nodes  Abd:  Soft, obese, nontender, nondistended, no masses or organomegaly  Back:  No CVA tenderness, no tenderness to palpation along spine  Pelvic  Normal appearing external female genitalia, no visible lesions, no rashes  Vagina is free of discharge, normal vaginal epithelium, no abnormal  lesions, no evidence of prolapse anteriorly or posteriorly    Surgical absence of the uterus and cervix is appreciated  No anoperineal lesions  Rectal:  No masses, tenderness, hemorrhoids, or obvious blood  Skin:  No concerning lesions  Extremeties: No edema      Assessment & Plan:   1  Routine annual exam      RTO one year orPRN  2  Encounter for screening mammogram, referral for mammogram given to patient  3     Obesity, weight loss and better eating habits encouraged

## 2018-10-17 ENCOUNTER — OFFICE VISIT (OUTPATIENT)
Dept: GASTROENTEROLOGY | Facility: CLINIC | Age: 63
End: 2018-10-17
Payer: COMMERCIAL

## 2018-10-17 VITALS
TEMPERATURE: 98.4 F | HEART RATE: 77 BPM | HEIGHT: 61 IN | DIASTOLIC BLOOD PRESSURE: 83 MMHG | BODY MASS INDEX: 34.14 KG/M2 | SYSTOLIC BLOOD PRESSURE: 179 MMHG | WEIGHT: 180.8 LBS

## 2018-10-17 DIAGNOSIS — K64.8 OTHER HEMORRHOIDS: Primary | ICD-10-CM

## 2018-10-17 DIAGNOSIS — E66.9 OBESITY (BMI 30-39.9): ICD-10-CM

## 2018-10-17 DIAGNOSIS — Z12.11 COLON CANCER SCREENING: ICD-10-CM

## 2018-10-17 DIAGNOSIS — K57.30 DIVERTICULOSIS OF LARGE INTESTINE WITHOUT HEMORRHAGE: ICD-10-CM

## 2018-10-17 PROCEDURE — 4040F PNEUMOC VAC/ADMIN/RCVD: CPT | Performed by: INTERNAL MEDICINE

## 2018-10-17 PROCEDURE — 99204 OFFICE O/P NEW MOD 45 MIN: CPT | Performed by: INTERNAL MEDICINE

## 2018-10-17 NOTE — LETTER
October 17, 2018     Renee [address] [place] [address]    Patient: Yvonne Conner   YOB: 1955   Date of Visit: 10/17/2018       Dear Dr Octavia Eagle:    Thank you for referring Estefanía Jeremy to me for evaluation  Below are my notes for this consultation  If you have questions, please do not hesitate to call me  I look forward to following your patient along with you  Sincerely,        Clara Crenshaw DO        CC: No Recipients  Clara Crenshaw DO  10/17/2018 10:14 AM  Sign at close encounter  [facility] - Outpatient Consultation  Yvonne Conner 57 y o  female MRN: [id number]  Encounter: [id number]          ASSESSMENT AND PLAN:      57year old female     1  Colon cancer screening prior colon polyps- reviewed her last colonoscopy report  And she will be due for repeat colonoscopy in 2 years, recall placed for this, discussed with pt that I do not think she needs a repeat colonoscopy now given her colonoscopy without polyps just 3 years ago      2  Hemorrhoids- with occasional rectal bleeding    3  Obesity (BMI 30-39  9)- can do her procedure at [facility] in the future given BMI is less than 40    Follow up as needed in the office       ______________________________________________________________________    HPI:  57year old female referred by PCP  She had a colonoscopy about 10 years ago and had a colonoscopy with one polyp removed  She was given a 3 year follow up at that time as she was told by her first GI doctor, Dr Dirk Palm that the polyps were precancerous in nature  She had her last colonoscopy in 2015  She has had 3 colonoscopies in her life and the last one was in 2015  Denies any current GI symptoms  REVIEW OF SYSTEMS:    CONSTITUTIONAL: Denies any fever, chills, rigors, and weight loss  HEENT: No earache or tinnitus  Denies hearing loss or visual disturbances    CARDIOVASCULAR: No chest pain or palpitations  RESPIRATORY: Denies any cough, hemoptysis, shortness of breath or dyspnea on exertion  GASTROINTESTINAL: As noted in the History of Present Illness  GENITOURINARY: No problems with urination  Denies any hematuria or dysuria  NEUROLOGIC: No dizziness or vertigo, denies headaches  MUSCULOSKELETAL: Denies any muscle or joint pain  SKIN: Denies skin rashes or itching  ENDOCRINE: Denies excessive thirst  Denies intolerance to heat or cold  PSYCHOSOCIAL: Denies depression or anxiety  Denies any recent memory loss         Historical Information   Past Medical History:   Diagnosis Date    Adhesive capsulitis of right shoulder     LAST ASSESSED 10/12/18     2 para 2     Hypertension     Nephrolithiasis     Prediabetes     LAST ASSESSED 17     Past Surgical History:   Procedure Laterality Date    ABDOMINAL HYSTERECTOMY      CATARACT EXTRACTION      GALLBLADDER SURGERY      TONSILLECTOMY       Social History   History   Alcohol Use No     History   Drug Use No     History   Smoking Status    Never Smoker   Smokeless Tobacco    Never Used     Family History   Problem Relation Age of Onset    Alcohol abuse Mother     Hypertension Mother     Stroke Mother     Breast cancer Sister        Meds/Allergies       Current Outpatient Prescriptions:     amLODIPine (NORVASC) 10 mg tablet    Cholecalciferol (VITAMIN D3) 1000 units CAPS    famotidine (PEPCID) 10 mg tablet    Flaxseed, Linseed, (FLAX SEED OIL) 1000 MG CAPS    fluticasone (FLONASE) 50 mcg/act nasal spray    Lactobacillus (PROBIOTIC ACIDOPHILUS) CAPS    lisinopril (ZESTRIL) 40 mg tablet    loratadine (CLARITIN) 10 mg tablet    metoprolol succinate (TOPROL-XL) 50 mg 24 hr tablet    Red Yeast Rice 600 MG CAPS    repaglinide (PRANDIN) 0 5 mg tablet    Allergies   Allergen Reactions    Sulfa Antibiotics Myalgia     Other reaction(s): severe joint pain  Action Taken: joint pain and stiffness;     Sulfamethopyrazine Joint stiffness    Sulfamethoxazole-Trimethoprim Other (See Comments)     Joint pain           Objective     Blood pressure (!) 179/83, pulse 77, temperature 98 4 °F (36 9 °C), temperature source Tympanic, height 5' 1" (1 549 m), weight 82 kg (180 lb 12 8 oz), not currently breastfeeding  Body mass index is 34 16 kg/m²  PHYSICAL EXAM:      General Appearance:   Alert, cooperative, no distress   HEENT:   Normocephalic, atraumatic, anicteric      Neck:  Supple, symmetrical, trachea midline   Lungs:   Clear to auscultation bilaterally; no rales, rhonchi or wheezing; respirations unlabored    Heart[de-identified]   Regular rate and rhythm; no murmur, rub, or gallop  Abdomen:   Soft, non-tender, non-distended; normal bowel sounds; no masses, no organomegaly    Genitalia:   Deferred    Rectal:   Deferred    Extremities:  No cyanosis, clubbing or edema    Pulses:  2+ and symmetric    Skin:  No jaundice, rashes, or lesions    Lymph nodes:  No palpable cervical lymphadenopathy        Lab Results:   No visits with results within 1 Day(s) from this visit  Latest known visit with results is:   Orders Only on 08/23/2018   Component Date Value    Glucose 08/23/2018 113*    BUN 08/23/2018 14     Creatinine 08/23/2018 0 92     eGFR Non  08/23/2018 66     SL AMB EGFR  AMER* 08/23/2018 76     SL AMB BUN/CREATININE RA* 74/73/5434 NOT APPLICABLE     Sodium 60/46/7119 142     SL AMB POTASSIUM 08/23/2018 4 4     Chloride 08/23/2018 104     CO2 08/23/2018 30     SL AMB CALCIUM 08/23/2018 9 4     Creatinine, Urine 08/23/2018 104     Microalbum  ,U,Random 08/23/2018 1 2     Microalb/Creat Ratio 08/23/2018 12     Hemoglobin A1C 08/23/2018 5 7*    Estimated Average Glucose 08/23/2018 117     Estimated Average Glucos* 08/23/2018 6 5          Radiology Results:   No results found

## 2018-10-17 NOTE — PROGRESS NOTES
[facility] - Outpatient Consultation  Deisy Rice 57 y o  female MRN: [id number]  Encounter: [id number]          ASSESSMENT AND PLAN:      57year old female     3  Colon cancer screening prior colon polyps- reviewed her last colonoscopy report  And she will be due for repeat colonoscopy in 2 years, recall placed for this, discussed with pt that I do not think she needs a repeat colonoscopy now given her colonoscopy without polyps just 3 years ago      2  Hemorrhoids- with occasional rectal bleeding    3  Obesity (BMI 30-39  9)- can do her procedure at [facility] in the future given BMI is less than 40    Follow up as needed in the office       ______________________________________________________________________    HPI:  57year old female referred by PCP  She had a colonoscopy about 10 years ago and had a colonoscopy with one polyp removed  She was given a 3 year follow up at that time as she was told by her first GI doctor, Dr Mackey Najjar that the polyps were precancerous in nature  She had her last colonoscopy in 2015  She has had 3 colonoscopies in her life and the last one was in 2015  Denies any current GI symptoms  REVIEW OF SYSTEMS:    CONSTITUTIONAL: Denies any fever, chills, rigors, and weight loss  HEENT: No earache or tinnitus  Denies hearing loss or visual disturbances  CARDIOVASCULAR: No chest pain or palpitations  RESPIRATORY: Denies any cough, hemoptysis, shortness of breath or dyspnea on exertion  GASTROINTESTINAL: As noted in the History of Present Illness  GENITOURINARY: No problems with urination  Denies any hematuria or dysuria  NEUROLOGIC: No dizziness or vertigo, denies headaches  MUSCULOSKELETAL: Denies any muscle or joint pain  SKIN: Denies skin rashes or itching  ENDOCRINE: Denies excessive thirst  Denies intolerance to heat or cold  PSYCHOSOCIAL: Denies depression or anxiety  Denies any recent memory loss  Historical Information   Past Medical History:   Diagnosis Date    Adhesive capsulitis of right shoulder     LAST ASSESSED 10/12/18     2 para 2     Hypertension     Nephrolithiasis     Prediabetes     LAST ASSESSED 17     Past Surgical History:   Procedure Laterality Date    ABDOMINAL HYSTERECTOMY      CATARACT EXTRACTION      GALLBLADDER SURGERY      TONSILLECTOMY       Social History   History   Alcohol Use No     History   Drug Use No     History   Smoking Status    Never Smoker   Smokeless Tobacco    Never Used     Family History   Problem Relation Age of Onset    Alcohol abuse Mother     Hypertension Mother     Stroke Mother     Breast cancer Sister        Meds/Allergies       Current Outpatient Prescriptions:     amLODIPine (NORVASC) 10 mg tablet    Cholecalciferol (VITAMIN D3) 1000 units CAPS    famotidine (PEPCID) 10 mg tablet    Flaxseed, Linseed, (FLAX SEED OIL) 1000 MG CAPS    fluticasone (FLONASE) 50 mcg/act nasal spray    Lactobacillus (PROBIOTIC ACIDOPHILUS) CAPS    lisinopril (ZESTRIL) 40 mg tablet    loratadine (CLARITIN) 10 mg tablet    metoprolol succinate (TOPROL-XL) 50 mg 24 hr tablet    Red Yeast Rice 600 MG CAPS    repaglinide (PRANDIN) 0 5 mg tablet    Allergies   Allergen Reactions    Sulfa Antibiotics Myalgia     Other reaction(s): severe joint pain  Action Taken: joint pain and stiffness;     Sulfamethopyrazine      Joint stiffness    Sulfamethoxazole-Trimethoprim Other (See Comments)     Joint pain           Objective     Blood pressure (!) 179/83, pulse 77, temperature 98 4 °F (36 9 °C), temperature source Tympanic, height 5' 1" (1 549 m), weight 82 kg (180 lb 12 8 oz), not currently breastfeeding  Body mass index is 34 16 kg/m²          PHYSICAL EXAM:      General Appearance:   Alert, cooperative, no distress   HEENT:   Normocephalic, atraumatic, anicteric      Neck:  Supple, symmetrical, trachea midline   Lungs:   Clear to auscultation bilaterally; no rales, rhonchi or wheezing; respirations unlabored    Heart[de-identified]   Regular rate and rhythm; no murmur, rub, or gallop  Abdomen:   Soft, non-tender, non-distended; normal bowel sounds; no masses, no organomegaly    Genitalia:   Deferred    Rectal:   Deferred    Extremities:  No cyanosis, clubbing or edema    Pulses:  2+ and symmetric    Skin:  No jaundice, rashes, or lesions    Lymph nodes:  No palpable cervical lymphadenopathy        Lab Results:   No visits with results within 1 Day(s) from this visit  Latest known visit with results is:   Orders Only on 08/23/2018   Component Date Value    Glucose 08/23/2018 113*    BUN 08/23/2018 14     Creatinine 08/23/2018 0 92     eGFR Non  08/23/2018 66     SL AMB EGFR  AMER* 08/23/2018 76     SL AMB BUN/CREATININE RA* 93/38/6852 NOT APPLICABLE     Sodium 95/54/5819 142     SL AMB POTASSIUM 08/23/2018 4 4     Chloride 08/23/2018 104     CO2 08/23/2018 30     SL AMB CALCIUM 08/23/2018 9 4     Creatinine, Urine 08/23/2018 104     Microalbum  ,U,Random 08/23/2018 1 2     Microalb/Creat Ratio 08/23/2018 12     Hemoglobin A1C 08/23/2018 5 7*    Estimated Average Glucose 08/23/2018 117     Estimated Average Glucos* 08/23/2018 6 5          Radiology Results:   No results found

## 2018-10-19 DIAGNOSIS — I10 BENIGN ESSENTIAL HYPERTENSION: Primary | ICD-10-CM

## 2018-10-19 RX ORDER — AMLODIPINE BESYLATE 10 MG/1
10 TABLET ORAL DAILY
Qty: 90 TABLET | Refills: 1 | Status: SHIPPED | OUTPATIENT
Start: 2018-10-19 | End: 2018-10-22 | Stop reason: SDUPTHER

## 2018-10-22 DIAGNOSIS — I10 BENIGN ESSENTIAL HYPERTENSION: ICD-10-CM

## 2018-10-22 RX ORDER — AMLODIPINE BESYLATE 10 MG/1
TABLET ORAL
Qty: 90 TABLET | Refills: 3 | Status: SHIPPED | OUTPATIENT
Start: 2018-10-22 | End: 2020-01-10

## 2018-11-19 DIAGNOSIS — I10 BENIGN ESSENTIAL HYPERTENSION: ICD-10-CM

## 2018-11-19 RX ORDER — METOPROLOL SUCCINATE 50 MG/1
50 TABLET, EXTENDED RELEASE ORAL DAILY
Qty: 90 TABLET | Refills: 0 | Status: SHIPPED | OUTPATIENT
Start: 2018-11-19 | End: 2018-11-21 | Stop reason: ALTCHOICE

## 2018-11-21 ENCOUNTER — TELEPHONE (OUTPATIENT)
Dept: FAMILY MEDICINE CLINIC | Facility: CLINIC | Age: 63
End: 2018-11-21

## 2018-11-21 DIAGNOSIS — I10 ESSENTIAL HYPERTENSION: Primary | ICD-10-CM

## 2018-11-21 NOTE — TELEPHONE ENCOUNTER
Please contact patient  I received a request from her pharmacy to change her metoprolol from an extended release to regular metoprolol due to cost     I did send a new prescription to the pharmacy for regular Metoprolol 25 mg to be taken TWICE daily  Please have her check her blood pressures at home and bring her BP log with her to her next visit with Dr Vince Santiago on 12/18

## 2018-12-12 LAB
25(OH)D3 SERPL-MCNC: 44 NG/ML (ref 30–100)
ALBUMIN SERPL-MCNC: 4.2 G/DL (ref 3.6–5.1)
ALBUMIN/GLOB SERPL: 1.6 (CALC) (ref 1–2.5)
ALP SERPL-CCNC: 70 U/L (ref 33–130)
ALT SERPL-CCNC: 16 U/L (ref 6–29)
AST SERPL-CCNC: 13 U/L (ref 10–35)
BASOPHILS # BLD AUTO: 51 CELLS/UL (ref 0–200)
BASOPHILS NFR BLD AUTO: 0.8 %
BILIRUB SERPL-MCNC: 0.4 MG/DL (ref 0.2–1.2)
BUN SERPL-MCNC: 18 MG/DL (ref 7–25)
BUN/CREAT SERPL: ABNORMAL (CALC) (ref 6–22)
CALCIUM SERPL-MCNC: 9.5 MG/DL (ref 8.6–10.4)
CHLORIDE SERPL-SCNC: 104 MMOL/L (ref 98–110)
CHOLEST SERPL-MCNC: 208 MG/DL
CHOLEST/HDLC SERPL: 3.4 (CALC)
CO2 SERPL-SCNC: 29 MMOL/L (ref 20–32)
CREAT SERPL-MCNC: 0.95 MG/DL (ref 0.5–0.99)
EOSINOPHIL # BLD AUTO: 160 CELLS/UL (ref 15–500)
EOSINOPHIL NFR BLD AUTO: 2.5 %
ERYTHROCYTE [DISTWIDTH] IN BLOOD BY AUTOMATED COUNT: 13.1 % (ref 11–15)
GLOBULIN SER CALC-MCNC: 2.7 G/DL (CALC) (ref 1.9–3.7)
GLUCOSE SERPL-MCNC: 115 MG/DL (ref 65–99)
HBA1C MFR BLD: 6 % OF TOTAL HGB
HCT VFR BLD AUTO: 47.1 % (ref 35–45)
HDLC SERPL-MCNC: 61 MG/DL
HGB BLD-MCNC: 15.8 G/DL (ref 11.7–15.5)
LDLC SERPL CALC-MCNC: 124 MG/DL (CALC)
LYMPHOCYTES # BLD AUTO: 1888 CELLS/UL (ref 850–3900)
LYMPHOCYTES NFR BLD AUTO: 29.5 %
MCH RBC QN AUTO: 29.8 PG (ref 27–33)
MCHC RBC AUTO-ENTMCNC: 33.5 G/DL (ref 32–36)
MCV RBC AUTO: 88.9 FL (ref 80–100)
MONOCYTES # BLD AUTO: 698 CELLS/UL (ref 200–950)
MONOCYTES NFR BLD AUTO: 10.9 %
NEUTROPHILS # BLD AUTO: 3603 CELLS/UL (ref 1500–7800)
NEUTROPHILS NFR BLD AUTO: 56.3 %
NONHDLC SERPL-MCNC: 147 MG/DL (CALC)
PLATELET # BLD AUTO: 320 THOUSAND/UL (ref 140–400)
PMV BLD REES-ECKER: 12.2 FL (ref 7.5–12.5)
POTASSIUM SERPL-SCNC: 4.1 MMOL/L (ref 3.5–5.3)
PROT SERPL-MCNC: 6.9 G/DL (ref 6.1–8.1)
RBC # BLD AUTO: 5.3 MILLION/UL (ref 3.8–5.1)
SL AMB EGFR AFRICAN AMERICAN: 73 ML/MIN/1.73M2
SL AMB EGFR NON AFRICAN AMERICAN: 63 ML/MIN/1.73M2
SODIUM SERPL-SCNC: 141 MMOL/L (ref 135–146)
TRIGL SERPL-MCNC: 124 MG/DL
TSH SERPL-ACNC: 1.26 MIU/L (ref 0.4–4.5)
WBC # BLD AUTO: 6.4 THOUSAND/UL (ref 3.8–10.8)

## 2018-12-13 ENCOUNTER — TELEPHONE (OUTPATIENT)
Dept: FAMILY MEDICINE CLINIC | Facility: CLINIC | Age: 63
End: 2018-12-13

## 2018-12-13 DIAGNOSIS — R71.8 ELEVATED HEMATOCRIT: Primary | ICD-10-CM

## 2018-12-13 NOTE — TELEPHONE ENCOUNTER
----- Message from [id number] Hightower sent at 12/13/2018 12:12 PM EST -----  Please contact patient regarding blood work results  Blood work is stable  Her blood count is a little bit high, I would like her to repeat this in 2-3 weeks  She needs to drink fluids and be well hydrated before completing this blood work  Keep scheduled follow up appointment with Dr Rayne Anguiano on 12/18       TY

## 2018-12-13 NOTE — PROGRESS NOTES
Please contact patient regarding blood work results  Blood work is stable  Her blood count is a little bit high, I would like her to repeat this in 2-3 weeks  She needs to drink fluids and be well hydrated before completing this blood work  Keep scheduled follow up appointment with Dr Vince Santiago on 12/18       TY

## 2018-12-18 ENCOUNTER — OFFICE VISIT (OUTPATIENT)
Dept: FAMILY MEDICINE CLINIC | Facility: CLINIC | Age: 63
End: 2018-12-18
Payer: COMMERCIAL

## 2018-12-18 VITALS
WEIGHT: 181.4 LBS | HEIGHT: 61 IN | SYSTOLIC BLOOD PRESSURE: 144 MMHG | RESPIRATION RATE: 16 BRPM | TEMPERATURE: 98.4 F | BODY MASS INDEX: 34.25 KG/M2 | HEART RATE: 72 BPM | DIASTOLIC BLOOD PRESSURE: 80 MMHG

## 2018-12-18 DIAGNOSIS — G47.33 OBSTRUCTIVE SLEEP APNEA: ICD-10-CM

## 2018-12-18 DIAGNOSIS — I10 HYPERTENSION, UNSPECIFIED TYPE: Primary | ICD-10-CM

## 2018-12-18 DIAGNOSIS — E78.5 HYPERLIPIDEMIA, UNSPECIFIED HYPERLIPIDEMIA TYPE: ICD-10-CM

## 2018-12-18 DIAGNOSIS — Z23 NEED FOR PNEUMOCOCCAL VACCINE: ICD-10-CM

## 2018-12-18 DIAGNOSIS — Z00.00 ROUTINE HEALTH MAINTENANCE: ICD-10-CM

## 2018-12-18 DIAGNOSIS — E11.9 TYPE 2 DIABETES MELLITUS WITHOUT COMPLICATION, WITHOUT LONG-TERM CURRENT USE OF INSULIN (HCC): ICD-10-CM

## 2018-12-18 DIAGNOSIS — R22.31 LUMP OF SKIN OF RIGHT UPPER EXTREMITY: ICD-10-CM

## 2018-12-18 DIAGNOSIS — Z13.820 SCREENING FOR OSTEOPOROSIS: ICD-10-CM

## 2018-12-18 PROCEDURE — 99214 OFFICE O/P EST MOD 30 MIN: CPT | Performed by: FAMILY MEDICINE

## 2018-12-18 PROCEDURE — 3008F BODY MASS INDEX DOCD: CPT | Performed by: FAMILY MEDICINE

## 2018-12-18 PROCEDURE — G0009 ADMIN PNEUMOCOCCAL VACCINE: HCPCS

## 2018-12-18 PROCEDURE — 1101F PT FALLS ASSESS-DOCD LE1/YR: CPT | Performed by: FAMILY MEDICINE

## 2018-12-18 PROCEDURE — 90670 PCV13 VACCINE IM: CPT

## 2018-12-18 PROCEDURE — 4040F PNEUMOC VAC/ADMIN/RCVD: CPT

## 2018-12-18 PROCEDURE — 1036F TOBACCO NON-USER: CPT | Performed by: FAMILY MEDICINE

## 2018-12-18 NOTE — PROGRESS NOTES
FAMILY PRACTICE OFFICE VISIT       NAME: Ifeoma Inman  AGE: 70 y o  SEX: female       : 1955        MRN: [id number]    DATE: 2018  TIME: 1:36 PM    Assessment and Plan     Problem List Items Addressed This Visit     Type 2 diabetes mellitus ([address] )    Relevant Orders    Comprehensive metabolic panel    Hemoglobin A1C    Obstructive sleep apnea    Hypertension - Primary    Relevant Orders    Comprehensive metabolic panel    Hyperlipidemia    Relevant Orders    Comprehensive metabolic panel    Routine health maintenance    Lump of skin of right upper extremity    Relevant Orders    US extremity soft tissue      Other Visit Diagnoses     Screening for osteoporosis        Relevant Orders    DXA bone density spine hip and pelvis    Need for pneumococcal vaccine        Relevant Orders    PNEUMOCOCCAL CONJUGATE VACCINE 13-VALENT GREATER THAN 6 MONTHS (Completed)        Hypertension:  BP is elevated today  Patient does admit to wearing about the skin lump in her upper arm as well as worrying about her daughter and her health recently  I would like patient to come in for follow-up of BP  I would like patient to monitor it at home as well  Continue with lifestyle modifications  Continue with amlodipine, metoprolol, lisinopril  Hyperlipidemia:  Unable to tolerate statin  Continue with lifestyle modifications  Will continue to monitor  Patient does take red yeast rice  Diabetes:  Most recent A1c noted to be 6 0  Continue with prandin and lifestyle modifications  Up-to-date with ophthalmology exam  Foot exam completed today  Obstructive sleep apnea:  Unable to tolerate CPA P  Patient does feel rested in the mornings  Skin lump over right upper arm: Will order ultrasound to further classify/characterize skin lump  Routine health maintenance:  Patient's the GI, will need follow-up colonoscopy in 2 years  Up-to-date with mammogram     Rx for DEXA scan provided today    Up-to-date with flu vaccine, Prevnar 13, Tdap  I have discussed the new shingles vaccine with the patient  There are no Patient Instructions on file for this visit  Chief Complaint     Chief Complaint   Patient presents with    Follow-up     Pt is here for 4 mos f/u, cyst on rt arm 4 + wks       History of Present Illness     HPI   68-year-old female here for routine follow-up of chronic conditions and discuss recent blood work results  Patient states she is doing well overall  Patient states she has had recent stressors as her daughter was not feeling well however she is doing better now  Noticed a lump 3-4 weeks ago in her right upper arm, painful when she pushes on it  Sore   Does not know if it has grown  Will have colonoscopy in 2 yrs  Takes lisinopril in am and amlodpine and metoprolol 50 mg in evening    Review of Systems   Review of Systems   Constitutional: Negative for unexpected weight change  HENT: Negative  Eyes: Negative for visual disturbance  Respiratory: Negative for shortness of breath  Cardiovascular: Negative  Gastrointestinal: Negative for abdominal pain and constipation  Genitourinary: Negative for dysuria  Skin:        Skin lump over right upper arm   Psychiatric/Behavioral: Negative for dysphoric mood and sleep disturbance  Active Problem List     Patient Active Problem List   Diagnosis    Benign essential hypertension    Dyslipidemia    Type 2 diabetes mellitus ([address] )    Osteoarthritis of both knees    Obstructive sleep apnea    Obesity (BMI 30-39  9)    Hypovitaminosis D    Hemorrhoid    Hypertension    Hyperlipidemia    Routine health maintenance    Lump of skin of right upper extremity       Past Medical History:  Past Medical History:   Diagnosis Date    Adhesive capsulitis of right shoulder     LAST ASSESSED 10/12/18     2 para 2     Hypertension     Nephrolithiasis     Prediabetes     LAST ASSESSED 17       Past Surgical History:  Past Surgical History:   Procedure Laterality Date    ABDOMINAL HYSTERECTOMY      CATARACT EXTRACTION      GALLBLADDER SURGERY      TONSILLECTOMY         Family History:  Family History   Problem Relation Age of Onset    Alcohol abuse Mother     Hypertension Mother     Stroke Mother     Breast cancer Sister        Social History:  Social History     Social History    Marital status: /Civil Union     Spouse name: N/A    Number of children: 2    Years of education: N/A     Occupational History    RETIRED      Social History Main Topics    Smoking status: Never Smoker    Smokeless tobacco: Never Used    Alcohol use No    Drug use: No    Sexual activity: Not Currently     Other Topics Concern    Not on file     Social History Narrative    CAFFEINE USE     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         I have reviewed the patient's medical history in detail; there are no changes to the history as noted in the electronic medical record  Objective     Vitals:    12/18/18 0948   BP: 144/80   Pulse:    Resp:    Temp:      Wt Readings from Last 3 Encounters:   12/18/18 82 3 kg (181 lb 6 4 oz)   10/17/18 82 kg (180 lb 12 8 oz)   10/05/18 82 kg (180 lb 12 8 oz)     Vitals:    12/18/18 0853 12/18/18 0948   BP: 150/88 144/80   Pulse: 72    Resp: 16    Temp: 98 4 °F (36 9 °C)    TempSrc: Tympanic    Weight: 82 3 kg (181 lb 6 4 oz)    Height: 5' 1" (1 549 m)          Physical Exam   Constitutional: She is oriented to person, place, and time  She appears well-developed and well-nourished  HENT:   Head: Normocephalic and atraumatic  Mouth/Throat: Oropharynx is clear and moist    Eyes: Pupils are equal, round, and reactive to light  Conjunctivae and EOM are normal    Neck: Normal range of motion  Neck supple  No thyromegaly present  Cardiovascular: Normal rate, regular rhythm and normal heart sounds  Pulses are no weak pulses     Pulses:       Dorsalis pedis pulses are 2+ on the right side, and 2+ on the left side    Pulmonary/Chest: Effort normal and breath sounds normal    Abdominal: Soft  Bowel sounds are normal  She exhibits no distension  There is no tenderness  Musculoskeletal: Normal range of motion  She exhibits no edema  Feet:   Right Foot:   Skin Integrity: Negative for ulcer, skin breakdown, erythema, warmth, callus or dry skin  Left Foot:   Skin Integrity: Negative for ulcer, skin breakdown, erythema, warmth, callus or dry skin  Lymphadenopathy:     She has no cervical adenopathy  Neurological: She is alert and oriented to person, place, and time  Psychiatric: She has a normal mood and affect  Nursing note and vitals reviewed  Patient's shoes and socks removed  Right Foot/Ankle   Right Foot Inspection  Skin Exam: skin normal skin not intact, no dry skin, no warmth, no callus, no erythema, no maceration, no abnormal color, no pre-ulcer, no ulcer and no callus                          Toe Exam: ROM and strength within normal limits  Sensory     Proprioception: intact   Monofilament testing: intact  Vascular    The right DP pulse is 2+  Left Foot/Ankle  Left Foot Inspection  Skin Exam: skin normalskin not intact, no dry skin, no warmth, no erythema, no maceration, normal color, no pre-ulcer, no ulcer and no callus                         Toe Exam: ROM and strength within normal limits                   Sensory     Proprioception: intact  Monofilament: intact  Vascular    The left DP pulse is 2+  Assign Risk Category:  No deformity present; No loss of protective sensation;  No weak pulses       Risk: 0      Pertinent Laboratory/Diagnostic Studies:  Lab Results   Component Value Date    GLUCOSE 101 02/12/2014    BUN 18 12/11/2018    CREATININE 1 10 (H) 01/11/2018    CALCIUM 9 5 12/11/2018     01/11/2018    K 4 1 12/11/2018    CO2 29 12/11/2018     12/11/2018     Lab Results   Component Value Date    ALT 20 01/11/2018    AST 16 01/11/2018    ALKPHOS 70 12/11/2018    BILITOT 0 5 01/11/2018       Lab Results   Component Value Date    WBC 6 4 12/11/2018    HGB 15 8 (H) 12/11/2018    HCT 47 1 (H) 12/11/2018    MCV 88 9 12/11/2018     12/11/2018       Lab Results   Component Value Date    TSH 1 26 12/11/2018       Lab Results   Component Value Date    CHOL 193 01/11/2018     Lab Results   Component Value Date    TRIG 124 12/11/2018     Lab Results   Component Value Date    HDL 61 12/11/2018     No results found for: [facility]  Lab Results   Component Value Date    HGBA1C 6 0 (H) 12/11/2018       Results for orders placed or performed in visit on 12/11/18   Lipid panel   Result Value Ref Range    Total Cholesterol 208 (H) <200 mg/dL    HDL 61 >50 mg/dL    Triglycerides 124 <150 mg/dL    LDL Direct 124 (H) mg/dL (calc)    Chol HDLC Ratio 3 4 <5 0 (calc)    Non-HDL Cholesterol 147 (H) <130 mg/dL (calc)   Comprehensive metabolic panel   Result Value Ref Range    Glucose, Random 115 (H) 65 - 99 mg/dL    BUN 18 7 - 25 mg/dL    Creatinine 0 95 0 50 - 0 99 mg/dL    eGFR Non  63 > OR = 60 mL/min/1 73m2    SL AMB EGFR  73 > OR = 60 mL/min/1 73m2    SL AMB BUN/CREATININE RATIO NOT APPLICABLE 6 - 22 (calc)    Sodium 141 135 - 146 mmol/L    Potassium 4 1 3 5 - 5 3 mmol/L    Chloride 104 98 - 110 mmol/L    CO2 29 20 - 32 mmol/L    SL AMB CALCIUM 9 5 8 6 - 10 4 mg/dL    SL AMB PROTEIN, TOTAL 6 9 6 1 - 8 1 g/dL    Albumin 4 2 3 6 - 5 1 g/dL    Globulin 2 7 1 9 - 3 7 g/dL (calc)    Albumin/Globulin Ratio 1 6 1 0 - 2 5 (calc)    TOTAL BILIRUBIN 0 4 0 2 - 1 2 mg/dL    Alkaline Phosphatase 70 33 - 130 U/L    SL AMB AST 13 10 - 35 U/L    SL AMB ALT 16 6 - 29 U/L   CBC and differential   Result Value Ref Range    White Blood Cell Count 6 4 3 8 - 10 8 Thousand/uL    Red Blood Cell Count 5 30 (H) 3 80 - 5 10 Million/uL    Hemoglobin 15 8 (H) 11 7 - 15 5 g/dL    HCT 47 1 (H) 35 0 - 45 0 %    MCV 88 9 80 0 - 100 0 fL    MCH 29 8 27 0 - 33 0 pg    MCHC 33 5 32 0 - 36 0 g/dL    RDW 13 1 11 0 - 15 0 %    Platelet Count 299 242 - 400 Thousand/uL    SL AMB MPV 12 2 7 5 - 12 5 fL    Neutrophils (Absolute) 3,603 1,500 - 7,800 cells/uL    Lymphocytes (Absolute) 1,888 850 - 3,900 cells/uL    Monocytes (Absolute) 698 200 - 950 cells/uL    Eosinophils (Absolute) 160 15 - 500 cells/uL    Basophils ABS 51 0 - 200 cells/uL    Neutrophils 56 3 %    Lymphocytes 29 5 %    Monocytes 10 9 %    Eosinophils 2 5 %    Basophils PCT 0 8 %   TSH, 3rd generation   Result Value Ref Range    TSH 1 26 0 40 - 4 50 mIU/L   Vitamin D 25 hydroxy   Result Value Ref Range    Vitamin D, 25-Hydroxy, Serum 44 30 - 100 ng/mL   Hemoglobin A1c (w/out EAG)   Result Value Ref Range    Hemoglobin A1C 6 0 (H) <5 7 % of total Hgb       Orders Placed This Encounter   Procedures    DXA bone density spine hip and pelvis    US extremity soft tissue    PNEUMOCOCCAL CONJUGATE VACCINE 13-VALENT GREATER THAN 6 MONTHS    Comprehensive metabolic panel    Hemoglobin A1C       ALLERGIES:  Allergies   Allergen Reactions    Sulfa Antibiotics Myalgia     Other reaction(s): severe joint pain  Action Taken: joint pain and stiffness;     Sulfamethopyrazine      Joint stiffness    Sulfamethoxazole-Trimethoprim Other (See Comments)     Joint pain       Current Medications     Current Outpatient Prescriptions   Medication Sig Dispense Refill    amLODIPine (NORVASC) 10 mg tablet TAKE 1 TABLET BY MOUTH DAILY 90 tablet 3    Cholecalciferol (VITAMIN D3) 1000 units CAPS Take by mouth      famotidine (PEPCID) 10 mg tablet Take 10 mg by mouth 2 (two) times a day      Flaxseed, Linseed, (FLAX SEED OIL) 1000 MG CAPS Take 1 capsule (1,000 mg total) by mouth daily 30 capsule 0    fluticasone (FLONASE) 50 mcg/act nasal spray 2 sprays into each nostril daily      Lactobacillus (PROBIOTIC ACIDOPHILUS) CAPS Take 1 tablet daily as supplement        lisinopril (ZESTRIL) 40 mg tablet Take 1 tablet (40 mg total) by mouth daily 90 tablet 3    loratadine (CLARITIN) 10 mg tablet Take 1 tablet by mouth daily as needed      metoprolol tartrate (LOPRESSOR) 25 mg tablet Take 1 tablet (25 mg total) by mouth every 12 (twelve) hours 60 tablet 3    Red Yeast Rice 600 MG CAPS Take 1 capsule (600 mg total) by mouth 2 (two) times a day 60 capsule 0    repaglinide (PRANDIN) 0 5 mg tablet Take 1 tablet (0 5 mg total) by mouth daily 180 tablet 0     No current facility-administered medications for this visit            Health Maintenance     Health Maintenance   Topic Date Due    Hepatitis C Screening  1955    Medicare Annual Wellness Visit (AWV)  1955    Diabetic Foot Exam  10/26/1963    HEMOGLOBIN A1C  06/11/2019    DM Eye Exam  07/18/2019    Depression Screening PHQ  08/15/2019    Fall Risk  12/18/2019    Urinary Incontinence Screening  12/18/2019    Pneumococcal PPSV23/PCV13 65+ Years / Low and Medium Risk (2 of 2 - PPSV23) 07/12/2021    CRC Screening: Colonoscopy  09/15/2025    DTaP,Tdap,and Td Vaccines (2 - Td) 07/17/2028    INFLUENZA VACCINE  Completed     Immunization History   Administered Date(s) Administered    Influenza 10/18/2018    Influenza Quadrivalent Preservative Free 3 years and older IM 10/04/2017    Influenza TIV (IM) 09/01/2014, 01/05/2016, 10/26/2016    Pneumococcal Conjugate 13-Valent 12/18/2018    Pneumococcal Polysaccharide PPV23 07/12/2016    Tdap 07/17/2018    Zoster 01/01/2014, 08/30/2016       Levy Dick MD

## 2018-12-21 PROBLEM — R22.31 LUMP OF SKIN OF RIGHT UPPER EXTREMITY: Status: ACTIVE | Noted: 2018-12-21

## 2018-12-23 LAB
ALBUMIN SERPL-MCNC: 4.3 G/DL (ref 3.6–5.1)
ALBUMIN/GLOB SERPL: 1.7 (CALC) (ref 1–2.5)
ALP SERPL-CCNC: 76 U/L (ref 33–130)
ALT SERPL-CCNC: 15 U/L (ref 6–29)
AST SERPL-CCNC: 13 U/L (ref 10–35)
BILIRUB SERPL-MCNC: 0.5 MG/DL (ref 0.2–1.2)
BUN SERPL-MCNC: 14 MG/DL (ref 7–25)
BUN/CREAT SERPL: ABNORMAL (CALC) (ref 6–22)
CALCIUM SERPL-MCNC: 9.2 MG/DL (ref 8.6–10.4)
CHLORIDE SERPL-SCNC: 102 MMOL/L (ref 98–110)
CO2 SERPL-SCNC: 30 MMOL/L (ref 20–32)
CREAT SERPL-MCNC: 0.89 MG/DL (ref 0.5–0.99)
GLOBULIN SER CALC-MCNC: 2.5 G/DL (CALC) (ref 1.9–3.7)
GLUCOSE SERPL-MCNC: 118 MG/DL (ref 65–99)
HBA1C MFR BLD: 6.2 % OF TOTAL HGB
POTASSIUM SERPL-SCNC: 3.8 MMOL/L (ref 3.5–5.3)
PROT SERPL-MCNC: 6.8 G/DL (ref 6.1–8.1)
SL AMB EGFR AFRICAN AMERICAN: 79 ML/MIN/1.73M2
SL AMB EGFR NON AFRICAN AMERICAN: 68 ML/MIN/1.73M2
SODIUM SERPL-SCNC: 141 MMOL/L (ref 135–146)

## 2018-12-24 ENCOUNTER — TELEPHONE (OUTPATIENT)
Dept: FAMILY MEDICINE CLINIC | Facility: CLINIC | Age: 63
End: 2018-12-24

## 2018-12-24 NOTE — TELEPHONE ENCOUNTER
----- Message from Nova Zhou MD sent at 12/24/2018 12:28 PM EST -----  Can you please let patient know that her blood work including kidneys, liver, calcium was within normal range  Her hemoglobin A1c is noted to be 6 2  I would like her to limit carbohydrates and avoid sweets    Thank you

## 2018-12-24 NOTE — TELEPHONE ENCOUNTER
----- Message from Milo Cornejo MD sent at 12/24/2018 12:28 PM EST -----  Can you please let patient know that her blood work including kidneys, liver, calcium was within normal range  Her hemoglobin A1c is noted to be 6 2  I would like her to limit carbohydrates and avoid sweets    Thank you

## 2018-12-27 ENCOUNTER — TELEPHONE (OUTPATIENT)
Dept: FAMILY MEDICINE CLINIC | Facility: CLINIC | Age: 63
End: 2018-12-27

## 2018-12-27 ENCOUNTER — HOSPITAL ENCOUNTER (OUTPATIENT)
Dept: ULTRASOUND IMAGING | Facility: HOSPITAL | Age: 63
Discharge: HOME | End: 2018-12-27
Payer: COMMERCIAL

## 2018-12-27 DIAGNOSIS — R22.31 LUMP OF SKIN OF RIGHT UPPER EXTREMITY: ICD-10-CM

## 2018-12-27 PROCEDURE — 76882 US LMTD JT/FCL EVL NVASC XTR: CPT

## 2018-12-27 NOTE — TELEPHONE ENCOUNTER
----- Message from Anderson Rouse MD sent at 12/27/2018 12:32 PM EST -----  Can you please let patient know that her ultrasound of her right upper arm showed a small fatty tumor called lipoma  If it bothers her, we can refer her to General surgery for further evaluation and removal   Can you please provide her with number for Dr Chey Newman    Thank you

## 2018-12-27 NOTE — TELEPHONE ENCOUNTER
----- Message from Colten Reyes MD sent at 12/27/2018 12:32 PM EST -----  Can you please let patient know that her ultrasound of her right upper arm showed a small fatty tumor called lipoma  If it bothers her, we can refer her to General surgery for further evaluation and removal   Can you please provide her with number for Dr Jak Huber    Thank you

## 2018-12-27 NOTE — PROGRESS NOTES
Can you please let patient know that her ultrasound of her right upper arm showed a small fatty tumor called lipoma  If it bothers her, we can refer her to General surgery for further evaluation and removal   Can you please provide her with number for Dr Estrellita Pak    Thank you

## 2018-12-28 ENCOUNTER — OFFICE VISIT (OUTPATIENT)
Dept: FAMILY MEDICINE CLINIC | Facility: CLINIC | Age: 63
End: 2018-12-28
Payer: COMMERCIAL

## 2018-12-28 ENCOUNTER — TELEPHONE (OUTPATIENT)
Dept: FAMILY MEDICINE CLINIC | Facility: CLINIC | Age: 63
End: 2018-12-28

## 2018-12-28 VITALS
HEART RATE: 70 BPM | TEMPERATURE: 98 F | SYSTOLIC BLOOD PRESSURE: 140 MMHG | HEIGHT: 61 IN | WEIGHT: 180.6 LBS | BODY MASS INDEX: 34.1 KG/M2 | DIASTOLIC BLOOD PRESSURE: 70 MMHG | RESPIRATION RATE: 16 BRPM

## 2018-12-28 DIAGNOSIS — D58.2 ELEVATED HEMOGLOBIN (HCC): ICD-10-CM

## 2018-12-28 DIAGNOSIS — D17.9 LIPOMA, UNSPECIFIED SITE: ICD-10-CM

## 2018-12-28 DIAGNOSIS — I10 HYPERTENSION, UNSPECIFIED TYPE: Primary | ICD-10-CM

## 2018-12-28 DIAGNOSIS — R22.31 LUMP OF SKIN OF RIGHT UPPER EXTREMITY: ICD-10-CM

## 2018-12-28 PROCEDURE — G0438 PPPS, INITIAL VISIT: HCPCS | Performed by: FAMILY MEDICINE

## 2018-12-28 PROCEDURE — 99214 OFFICE O/P EST MOD 30 MIN: CPT | Performed by: FAMILY MEDICINE

## 2018-12-28 RX ORDER — HYDROCHLOROTHIAZIDE 12.5 MG/1
12.5 TABLET ORAL DAILY
Qty: 30 TABLET | Refills: 3 | Status: SHIPPED | OUTPATIENT
Start: 2018-12-28 | End: 2019-01-21

## 2018-12-28 NOTE — TELEPHONE ENCOUNTER
RE: hydrochlorothiazide (HYDRODIURIL) 12 5 mg tablet     [facility] is calling for clarification on the instructions  It states to take 1 tablet (12 5 mg total) by mouth daily but to take 1/2 tablet daily  Please resend script over with corrections

## 2018-12-28 NOTE — PROGRESS NOTES
FAMILY PRACTICE OFFICE VISIT       NAME: Barak Diaz  AGE: 70 y o  SEX: female       : 1955        MRN: [id number]    DATE: 2018  TIME: 6:51 PM    Assessment and Plan     Problem List Items Addressed This Visit     Hypertension - Primary    Relevant Medications    hydrochlorothiazide (HYDRODIURIL) 12 5 mg tablet    Other Relevant Orders    Ambulatory referral to Cardiology    Lump of skin of right upper extremity    Relevant Orders    Ambulatory referral to General Surgery    Lipoma    Relevant Orders    Ambulatory referral to General Surgery    Elevated hemoglobin ([facility] [address] )       Hypertension:  Patient is brought in BP log from home which she takes with her wrist BP machine  BP has been elevated for the for the past couple of days  Because of this finding and today's blood pressure, I would like to start patient on low-dose hydrochlorothiazide  I would like to have patient take half of a 12 5 mg tablet every morning along with her lisinopril 40 mg and metoprolol 25 mg  She may continue with amlodipine 10 mg in the evening as well as metoprolol 25 mg in the evening  Continue with lifestyle modifications  Patient has joined a gym and will start routine exercise regimen  Patient had stopped exercising due to low back pain however this has improved after seeing chiropractor  I also would like patient to follow up with Cardiology as she is on 4 BP meds now  She is agreeable with this  Referral provided  Skin lump of right upper arm: This appears to be a lipoma  Because it is causing her discomfort, I would like patient to be further evaluated by General surgery  Referral provided  Elevated hemoglobin:  It appears that CBC was not drawn in her last blood work  I have reprinted CBC  Have encouraged to have a couple glasses of water prior to having  blood work done  Will notify patient of blood work results  There are no Patient Instructions on file for this visit    I have spent 25 minutes with Patient  today in which greater than 50% of this time was spent in counseling/coordination of care regarding Diagnostic results, Prognosis, Risks and benefits of tx options, Intructions for management, Patient and family education, Importance of tx compliance, Risk factor reductions and Impressions  Chief Complaint     Chief Complaint   Patient presents with    Medicare Wellness Visit       History of Present Illness     HPI   25-year-old female here for follow-up of blood pressure and to review blood work results  Patient states she was curious about her repeat CBC  Has signed up for the gym   Taking metoprolol 25 bid  Took lisinopril and metoprolol this morning  Review of Systems   Review of Systems   Constitutional: Negative for unexpected weight change  Eyes: Negative for visual disturbance  Respiratory: Negative for shortness of breath  Cardiovascular: Negative  Psychiatric/Behavioral: Negative for dysphoric mood  Active Problem List     Patient Active Problem List   Diagnosis    Benign essential hypertension    Dyslipidemia    Type 2 diabetes mellitus ([address] )    Osteoarthritis of both knees    Obstructive sleep apnea    Obesity (BMI 30-39  9)    Hypovitaminosis D    Hemorrhoid    Hypertension    Hyperlipidemia    Routine health maintenance    Lump of skin of right upper extremity    Lipoma    Elevated hemoglobin (HCC)       Past Medical History:  Past Medical History:   Diagnosis Date    Adhesive capsulitis of right shoulder     LAST ASSESSED 10/12/18     2 para 2     Hypertension     Nephrolithiasis     Prediabetes     LAST ASSESSED 17       Past Surgical History:  Past Surgical History:   Procedure Laterality Date    ABDOMINAL HYSTERECTOMY      CATARACT EXTRACTION      GALLBLADDER SURGERY      TONSILLECTOMY         Family History:  Family History   Problem Relation Age of Onset    Alcohol abuse Mother     Hypertension Mother  Stroke Mother     Breast cancer Sister        Social History:  Social History     Social History    Marital status: /Civil Union     Spouse name: N/A    Number of children: 2    Years of education: N/A     Occupational History    RETIRED      Social History Main Topics    Smoking status: Never Smoker    Smokeless tobacco: Never Used    Alcohol use No    Drug use: No    Sexual activity: Not Currently     Other Topics Concern    Not on file     Social History Narrative    CAFFEINE USE     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         I have reviewed the patient's medical history in detail; there are no changes to the history as noted in the electronic medical record  Objective     Vitals:    12/28/18 0759   BP: 140/70   Pulse: 70   Resp: 16   Temp: 98 °F (36 7 °C)     Wt Readings from Last 3 Encounters:   12/28/18 81 9 kg (180 lb 9 6 oz)   12/18/18 82 3 kg (181 lb 6 4 oz)   10/17/18 82 kg (180 lb 12 8 oz)       Physical Exam   Constitutional: She appears well-developed and well-nourished  HENT:   Head: Normocephalic and atraumatic  Mouth/Throat: Oropharynx is clear and moist    Eyes: Pupils are equal, round, and reactive to light  Conjunctivae and EOM are normal    Neck: Normal range of motion  Neck supple  Cardiovascular: Normal rate, regular rhythm and normal heart sounds  No murmur heard  Pulmonary/Chest: Effort normal and breath sounds normal    Musculoskeletal: She exhibits no edema  Lymphadenopathy:     She has no cervical adenopathy  Nursing note and vitals reviewed        Pertinent Laboratory/Diagnostic Studies:  Lab Results   Component Value Date    GLUCOSE 101 02/12/2014    BUN 14 12/22/2018    CREATININE 1 10 (H) 01/11/2018    CALCIUM 9 2 12/22/2018     01/11/2018    K 3 8 12/22/2018    CO2 30 12/22/2018     12/22/2018     Lab Results   Component Value Date    ALT 20 01/11/2018    AST 16 01/11/2018    ALKPHOS 76 12/22/2018    BILITOT 0 5 01/11/2018       Lab Results   Component Value Date    WBC 6 4 12/11/2018    HGB 15 8 (H) 12/11/2018    HCT 47 1 (H) 12/11/2018    MCV 88 9 12/11/2018     12/11/2018       Lab Results   Component Value Date    TSH 1 26 12/11/2018       Lab Results   Component Value Date    CHOL 193 01/11/2018     Lab Results   Component Value Date    TRIG 124 12/11/2018     Lab Results   Component Value Date    HDL 61 12/11/2018     No results found for: [address]  Lab Results   Component Value Date    HGBA1C 6 2 (H) 12/22/2018       Results for orders placed or performed in visit on 12/22/18   Comprehensive metabolic panel   Result Value Ref Range    Glucose, Random 118 (H) 65 - 99 mg/dL    BUN 14 7 - 25 mg/dL    Creatinine 0 89 0 50 - 0 99 mg/dL    eGFR Non  68 > OR = 60 mL/min/1 73m2    SL AMB EGFR  79 > OR = 60 mL/min/1 73m2    SL AMB BUN/CREATININE RATIO NOT APPLICABLE 6 - 22 (calc)    Sodium 141 135 - 146 mmol/L    Potassium 3 8 3 5 - 5 3 mmol/L    Chloride 102 98 - 110 mmol/L    CO2 30 20 - 32 mmol/L    SL AMB CALCIUM 9 2 8 6 - 10 4 mg/dL    SL AMB PROTEIN, TOTAL 6 8 6 1 - 8 1 g/dL    Albumin 4 3 3 6 - 5 1 g/dL    Globulin 2 5 1 9 - 3 7 g/dL (calc)    Albumin/Globulin Ratio 1 7 1 0 - 2 5 (calc)    TOTAL BILIRUBIN 0 5 0 2 - 1 2 mg/dL    Alkaline Phosphatase 76 33 - 130 U/L    SL AMB AST 13 10 - 35 U/L    SL AMB ALT 15 6 - 29 U/L   Hemoglobin A1c (w/out EAG)   Result Value Ref Range    Hemoglobin A1C 6 2 (H) <5 7 % of total Hgb       Orders Placed This Encounter   Procedures    Ambulatory referral to General Surgery    Ambulatory referral to Cardiology       ALLERGIES:  Allergies   Allergen Reactions    Sulfa Antibiotics Myalgia     Other reaction(s): severe joint pain  Action Taken: joint pain and stiffness;     Sulfamethopyrazine      Joint stiffness    Sulfamethoxazole-Trimethoprim Other (See Comments)     Joint pain       Current Medications     Current Outpatient Prescriptions   Medication Sig Dispense Refill    amLODIPine (NORVASC) 10 mg tablet TAKE 1 TABLET BY MOUTH DAILY 90 tablet 3    Cholecalciferol (VITAMIN D3) 1000 units CAPS Take by mouth      famotidine (PEPCID) 10 mg tablet Take 10 mg by mouth 2 (two) times a day      Flaxseed, Linseed, (FLAX SEED OIL) 1000 MG CAPS Take 1 capsule (1,000 mg total) by mouth daily 30 capsule 0    fluticasone (FLONASE) 50 mcg/act nasal spray 2 sprays into each nostril daily      Lactobacillus (PROBIOTIC ACIDOPHILUS) CAPS Take 1 tablet daily as supplement   lisinopril (ZESTRIL) 40 mg tablet Take 1 tablet (40 mg total) by mouth daily 90 tablet 3    loratadine (CLARITIN) 10 mg tablet Take 1 tablet by mouth daily as needed      metoprolol tartrate (LOPRESSOR) 25 mg tablet Take 1 tablet (25 mg total) by mouth every 12 (twelve) hours 60 tablet 3    Red Yeast Rice 600 MG CAPS Take 1 capsule (600 mg total) by mouth 2 (two) times a day 60 capsule 0    repaglinide (PRANDIN) 0 5 mg tablet Take 1 tablet (0 5 mg total) by mouth daily 180 tablet 0    hydrochlorothiazide (HYDRODIURIL) 12 5 mg tablet Take 1 tablet (12 5 mg total) by mouth daily Take 1/2 tablet daily 30 tablet 3     No current facility-administered medications for this visit            Health Maintenance     Health Maintenance   Topic Date Due    Hepatitis C Screening  1955    Medicare Annual Wellness Visit (AWV)  1955    HEMOGLOBIN A1C  06/22/2019    DM Eye Exam  07/18/2019    Depression Screening PHQ  08/15/2019    Fall Risk  12/18/2019    Urinary Incontinence Screening  12/18/2019    Diabetic Foot Exam  12/21/2019    Pneumococcal PPSV23/PCV13 65+ Years / Low and Medium Risk (2 of 2 - PPSV23) 07/12/2021    CRC Screening: Colonoscopy  09/15/2025    DTaP,Tdap,and Td Vaccines (2 - Td) 07/17/2028    INFLUENZA VACCINE  Completed     Immunization History   Administered Date(s) Administered    Influenza 10/18/2018    Influenza Quadrivalent Preservative Free 3 years and older IM 10/04/2017  Influenza TIV (IM) 09/01/2014, 01/05/2016, 10/26/2016    Pneumococcal Conjugate 13-Valent 12/18/2018    Pneumococcal Polysaccharide PPV23 07/12/2016    Tdap 07/17/2018    Zoster 01/01/2014, 08/30/2016       Maximus Garcia MD

## 2018-12-28 NOTE — PROGRESS NOTES
Assessment and Plan:    Problem List Items Addressed This Visit     Hypertension - Primary    Relevant Medications    hydrochlorothiazide (HYDRODIURIL) 12 5 mg tablet    Other Relevant Orders    Ambulatory referral to Cardiology    Lump of skin of right upper extremity    Relevant Orders    Ambulatory referral to General Surgery    Lipoma    Relevant Orders    Ambulatory referral to General Surgery    Elevated hemoglobin [facility])        Health Maintenance Due   Topic Date Due    Hepatitis C Screening  1955    Medicare Annual Wellness Visit (AWV)  1955         HPI:  Sharee Nova is a 70 y o  female here for her Initial Wellness Visit  Patient Active Problem List   Diagnosis    Benign essential hypertension    Dyslipidemia    Type 2 diabetes mellitus (HCC)    Osteoarthritis of both knees    Obstructive sleep apnea    Obesity (BMI 30-39  9)    Hypovitaminosis D    Hemorrhoid    Hypertension    Hyperlipidemia    Routine health maintenance    Lump of skin of right upper extremity    Lipoma    Elevated hemoglobin (HCC)     Past Medical History:   Diagnosis Date    Adhesive capsulitis of right shoulder     LAST ASSESSED 10/12/18     2 para 2     Hypertension     Nephrolithiasis     Prediabetes     LAST ASSESSED 17     Past Surgical History:   Procedure Laterality Date    ABDOMINAL HYSTERECTOMY      CATARACT EXTRACTION      GALLBLADDER SURGERY      TONSILLECTOMY       Family History   Problem Relation Age of Onset    Alcohol abuse Mother     Hypertension Mother     Stroke Mother     Breast cancer Sister      History   Smoking Status    Never Smoker   Smokeless Tobacco    Never Used     History   Alcohol Use No      History   Drug Use No       Current Outpatient Prescriptions   Medication Sig Dispense Refill    amLODIPine (NORVASC) 10 mg tablet TAKE 1 TABLET BY MOUTH DAILY 90 tablet 3    Cholecalciferol (VITAMIN D3) 1000 units CAPS Take by mouth      famotidine (PEPCID) 10 mg tablet Take 10 mg by mouth 2 (two) times a day      Flaxseed, Linseed, (FLAX SEED OIL) 1000 MG CAPS Take 1 capsule (1,000 mg total) by mouth daily 30 capsule 0    fluticasone (FLONASE) 50 mcg/act nasal spray 2 sprays into each nostril daily      Lactobacillus (PROBIOTIC ACIDOPHILUS) CAPS Take 1 tablet daily as supplement   lisinopril (ZESTRIL) 40 mg tablet Take 1 tablet (40 mg total) by mouth daily 90 tablet 3    loratadine (CLARITIN) 10 mg tablet Take 1 tablet by mouth daily as needed      metoprolol tartrate (LOPRESSOR) 25 mg tablet Take 1 tablet (25 mg total) by mouth every 12 (twelve) hours 60 tablet 3    Red Yeast Rice 600 MG CAPS Take 1 capsule (600 mg total) by mouth 2 (two) times a day 60 capsule 0    repaglinide (PRANDIN) 0 5 mg tablet Take 1 tablet (0 5 mg total) by mouth daily 180 tablet 0    hydrochlorothiazide (HYDRODIURIL) 12 5 mg tablet Take 1 tablet (12 5 mg total) by mouth daily Take 1/2 tablet daily 30 tablet 3     No current facility-administered medications for this visit  Allergies   Allergen Reactions    Sulfa Antibiotics Myalgia     Other reaction(s): severe joint pain  Action Taken: joint pain and stiffness;     Sulfamethopyrazine      Joint stiffness    Sulfamethoxazole-Trimethoprim Other (See Comments)     Joint pain     Immunization History   Administered Date(s) Administered    Influenza 10/18/2018    Influenza Quadrivalent Preservative Free 3 years and older IM 10/04/2017    Influenza TIV (IM) 09/01/2014, 01/05/2016, 10/26/2016    Pneumococcal Conjugate 13-Valent 12/18/2018    Pneumococcal Polysaccharide PPV23 07/12/2016    Tdap 07/17/2018    Zoster 01/01/2014, 08/30/2016       Patient Care Team:  Jesus Crouch MD as PCP - MD Flores Mohr MD    Medicare Screening Tests and Risk Assessments:  Last Medicare Wellness visit information reviewed, patient interviewed, no change since last AWV       Health Risk Assessment:  Patient rates overall health as very good  Patient feels that their physical health rating is Same  Eyesight was rated as Same  Hearing was rated as Same  Patient feels that their emotional and mental health rating is Same  Pain experienced by patient in the last 7 days has been Some  Emotional/Mental Health:  Patient has been feeling nervous/anxious  PHQ-9 Depression Screening:    Frequency of the following problems over the past two weeks:      1  Little interest or pleasure in doing things: 1 - several days      2  Feeling down, depressed, or hopeless: 1 - several days      3  Trouble falling or staying asleep, or sleeping too much: 0 - not at all      4  Feeling tired or having little energy: 0 - not at all      5  Poor appetite or overeatin - not at all      6  Feeling bad about yourself - or that you are a failure or have let yourself or your family down: 0 - not at all      7  Trouble concentrating on things, such as reading the newspaper or watching television: 0 - not at all      8  Moving or speaking so slowly that other people could have noticed  Or the opposite - being so fidgety or restless that you have been moving around a lot more than usual: 0 - not at all      9  Thoughts that you would be better off dead, or of hurting yourself in some way: 0 - not at all  PHQ-2 Score: 2  PHQ-9 Score: 2    Broken Bones/Falls: Fall Risk Assessment:    In the past year, patient has experienced: No history of falling in past year          Bladder/Bowel:  Patient has leaked urine accidently in the last six months  Patient reports no loss of bowel control  Immunizations:  Patient has had a flu vaccination within the last year  Patient has received a pneumonia shot  Patient has received a shingles shot  Patient has received tetanus/diphtheria shot   Date of tetanus/diphtheria shot: 2018(Additional Comments: Influenza: 10/18/2018  Prevnar 13: 2018  Pneumovax 23: 08/30/2016  Tdap: 07/17/2018  Zoster: 08/30/2016  )    Home Safety:  Patient does not have trouble with stairs inside or outside of their home  Patient currently reports that there are no safety hazards present in home, working smoke alarms, no working carbon monoxide detectors  Preventative Screenings:   Breast cancer screening performed, 5/23/2018  colon cancer screen completed, 9/15/2015  cholesterol screen completed, 12/11/2018  glaucoma eye exam completed,     Nutrition:  Current diet: Regular, Limited junk food and No Added Salt with servings of the following:    Medications:  Patient is currently taking over-the-counter supplements  List of OTC medications includes: Flaxseed Oil, Red Yeast Rice, Vitamin D, Probiotics  Patient is able to manage medications  Lifestyle Choices:  Patient reports no tobacco use  Patient has not smoked or used tobacco in the past   Patient reports no alcohol use  Patient drives a vehicle  Patient wears seat belt  Activities of Daily Living:  Can get out of bed by his or her self, able to dress self, able to make own meals, able to do own shopping, able to bathe self, can do own laundry/housekeeping, can manage own money, pay bills and track expenses    Previous Hospitalizations:  No hospitalization or ED visit in past 12 months        Advanced Directives:  Patient has decided on a power of   Patient has spoken to designated power of   Patient has completed advanced directive          Preventative Screening/Counseling:      Cardiovascular:      General: Screening Current      Counseling: Healthy Diet, Healthy Weight, Improve Blood Pressure and Improve Exercise Tolerance          Diabetes:      General: Screening Current          Colorectal Cancer:      General: Screening Current      Counseling: high fiber diet          Breast Cancer:      General: Screening Current          Cervical Cancer:      General: Screening Current Osteoporosis:      General: Risks and Benefits Discussed      Counseling: Calcium and Vitamin D Intake and Regular Weightbearing Exercise      Due for studies: DXA Axial          AAA:      General: Screening Not Indicated          Glaucoma:      General: Risks and Benefits Discussed          HIV:      General: Screening Not Indicated          Hepatitis C:      General: Screening Not Indicated        Advanced Directives:   has durable POA for healthcare, patient has an advanced directive  Immunizations:      Influenza: Influenza UTD This Year      Shingrix: Risks & Benefits Discussed      Hepatitis B (Low risk patients): Series Not Indicated      TDAP: Tdap Vaccine UTD  Additional Comments: Up-to-date with Prevnar 13    Other Preventative Counseling (Non-Medicare):   Increase physical activity and Nutrition Counseling

## 2019-01-08 ENCOUNTER — HOSPITAL ENCOUNTER (OUTPATIENT)
Dept: BONE DENSITY | Facility: IMAGING CENTER | Age: 64
Discharge: HOME | End: 2019-01-08
Payer: COMMERCIAL

## 2019-01-08 DIAGNOSIS — Z13.820 SCREENING FOR OSTEOPOROSIS: ICD-10-CM

## 2019-01-08 PROCEDURE — 77080 DXA BONE DENSITY AXIAL: CPT

## 2019-01-10 ENCOUNTER — TELEPHONE (OUTPATIENT)
Dept: FAMILY MEDICINE CLINIC | Facility: CLINIC | Age: 64
End: 2019-01-10

## 2019-01-10 NOTE — TELEPHONE ENCOUNTER
----- Message from Jg Ontiveros MD sent at 1/9/2019  7:52 PM EST -----  Can you please let patient know that her bone density results showed low bone density, not osteoporosis  I would like her to start muscle strengthening, weight-bearing exercises as well as calcium and vitamin-D supplementation  We will repeat her bone density in 2 years    Thank you

## 2019-01-10 NOTE — PROGRESS NOTES
Can you please let patient know that her bone density results showed low bone density, not osteoporosis  I would like her to start muscle strengthening, weight-bearing exercises as well as calcium and vitamin-D supplementation  We will repeat her bone density in 2 years    Thank you

## 2019-01-11 ENCOUNTER — TELEPHONE (OUTPATIENT)
Dept: FAMILY MEDICINE CLINIC | Facility: CLINIC | Age: 64
End: 2019-01-11

## 2019-01-11 NOTE — TELEPHONE ENCOUNTER
----- Message from Evy Cardenas MD sent at 1/9/2019  7:52 PM EST -----  Can you please let patient know that her bone density results showed low bone density, not osteoporosis  I would like her to start muscle strengthening, weight-bearing exercises as well as calcium and vitamin-D supplementation  We will repeat her bone density in 2 years    Thank you

## 2019-01-16 LAB
BASOPHILS # BLD AUTO: 61 CELLS/UL (ref 0–200)
BASOPHILS NFR BLD AUTO: 1 %
EOSINOPHIL # BLD AUTO: 207 CELLS/UL (ref 15–500)
EOSINOPHIL NFR BLD AUTO: 3.4 %
ERYTHROCYTE [DISTWIDTH] IN BLOOD BY AUTOMATED COUNT: 12.9 % (ref 11–15)
HCT VFR BLD AUTO: 46.3 % (ref 35–45)
HGB BLD-MCNC: 15.7 G/DL (ref 11.7–15.5)
LYMPHOCYTES # BLD AUTO: 1739 CELLS/UL (ref 850–3900)
LYMPHOCYTES NFR BLD AUTO: 28.5 %
MCH RBC QN AUTO: 30.3 PG (ref 27–33)
MCHC RBC AUTO-ENTMCNC: 33.9 G/DL (ref 32–36)
MCV RBC AUTO: 89.2 FL (ref 80–100)
MONOCYTES # BLD AUTO: 659 CELLS/UL (ref 200–950)
MONOCYTES NFR BLD AUTO: 10.8 %
NEUTROPHILS # BLD AUTO: 3434 CELLS/UL (ref 1500–7800)
NEUTROPHILS NFR BLD AUTO: 56.3 %
PLATELET # BLD AUTO: 285 THOUSAND/UL (ref 140–400)
PMV BLD REES-ECKER: 12.1 FL (ref 7.5–12.5)
RBC # BLD AUTO: 5.19 MILLION/UL (ref 3.8–5.1)
WBC # BLD AUTO: 6.1 THOUSAND/UL (ref 3.8–10.8)

## 2019-01-17 ENCOUNTER — TELEPHONE (OUTPATIENT)
Dept: FAMILY MEDICINE CLINIC | Facility: CLINIC | Age: 64
End: 2019-01-17

## 2019-01-17 NOTE — TELEPHONE ENCOUNTER
----- Message from [id number] [facility] sent at 1/17/2019 12:45 PM EST -----  Please let patient know her hemoglobin and hematocrit are still elevated  Please inquire if she was well hydrated before getting this blood work done? I would like her to follow up with a hematologist for further evaluation, Dr Zacarias Thakur, [phone]

## 2019-01-17 NOTE — PROGRESS NOTES
Please let patient know her hemoglobin and hematocrit are still elevated  Please inquire if she was well hydrated before getting this blood work done? I would like her to follow up with a hematologist for further evaluation, Dr Ruy Mckenna, [phone]

## 2019-01-18 NOTE — TELEPHONE ENCOUNTER
Spoke w/ pt and gave results and NP instructions  and she also states that she has been drinking more water then usual  Provided pt w/ [facility] phone number

## 2019-01-21 ENCOUNTER — OFFICE VISIT (OUTPATIENT)
Dept: FAMILY MEDICINE CLINIC | Facility: CLINIC | Age: 64
End: 2019-01-21
Payer: COMMERCIAL

## 2019-01-21 VITALS
HEIGHT: 61 IN | DIASTOLIC BLOOD PRESSURE: 80 MMHG | WEIGHT: 181.4 LBS | HEART RATE: 72 BPM | BODY MASS INDEX: 34.25 KG/M2 | SYSTOLIC BLOOD PRESSURE: 146 MMHG | RESPIRATION RATE: 16 BRPM

## 2019-01-21 DIAGNOSIS — R07.89 CHEST WALL TENDERNESS: ICD-10-CM

## 2019-01-21 DIAGNOSIS — R94.31 NONSPECIFIC ABNORMAL ELECTROCARDIOGRAM (ECG) (EKG): ICD-10-CM

## 2019-01-21 DIAGNOSIS — I10 HYPERTENSION, UNSPECIFIED TYPE: Primary | ICD-10-CM

## 2019-01-21 DIAGNOSIS — J32.9 SINUSITIS, UNSPECIFIED CHRONICITY, UNSPECIFIED LOCATION: ICD-10-CM

## 2019-01-21 PROCEDURE — 99214 OFFICE O/P EST MOD 30 MIN: CPT | Performed by: FAMILY MEDICINE

## 2019-01-21 PROCEDURE — 93000 ELECTROCARDIOGRAM COMPLETE: CPT | Performed by: FAMILY MEDICINE

## 2019-01-21 RX ORDER — AMOXICILLIN 500 MG/1
500 CAPSULE ORAL EVERY 12 HOURS SCHEDULED
Qty: 14 CAPSULE | Refills: 0 | Status: SHIPPED | OUTPATIENT
Start: 2019-01-21 | End: 2019-01-28

## 2019-01-21 NOTE — PROGRESS NOTES
FAMILY PRACTICE OFFICE VISIT       NAME: Jewel Kahn  AGE: 70 y o  SEX: female       : 1955        MRN: [id number]    DATE: 2019  TIME: 9:27 PM    Assessment and Plan     Problem List Items Addressed This Visit     Hypertension - Primary    Relevant Orders    POCT ECG (Completed)    Echo complete with contrast if indicated    Echo stress test w contrast if indicated    Sinusitis    Relevant Medications    amoxicillin (AMOXIL) 500 mg capsule    Other Relevant Orders    Echo stress test w contrast if indicated    Chest wall tenderness    Relevant Orders    POCT ECG (Completed)    Troponin I    Echo complete with contrast if indicated    Echo stress test w contrast if indicated    Nonspecific abnormal electrocardiogram (ECG) (EKG)    Relevant Orders    Echo complete with contrast if indicated        Hypertension:  BP is elevated on and off  Patient has recently started exercising  She will continue monitor sodium intake  POC EKG with nonspecific ST, T-wave changes  No prior EKG available for comparison  Will order echocardiogram as well as echo stress test as patient is experiencing chest wall tenderness  She will also keep her scheduled appointment with Cardiology  Continue with metoprolol, lisinopril, amlodipine  Patient was unable to tolerate hydrochlorothiazide  Sinusitis:  Will start patient on amoxicillin  Continue with Symptomatic treatment and supportive measures including adequate hydration  Recommend nasal saline rinses  Would benefit from [facility] as well  Return parameters discussed  There are no Patient Instructions on file for this visit    I have spent 25 minutes with Patient  today in which greater than 50% of this time was spent in counseling/coordination of care regarding Diagnostic results, Prognosis, Risks and benefits of tx options, Intructions for management, Patient and family education, Importance of tx compliance, Risk factor reductions and Impressions  Chief Complaint     Chief Complaint   Patient presents with    Follow-up    Nasal Congestion    Dizziness       History of Present Illness     HPI  68-year-old female here for follow-up of blood pressure  Has not taken hctz, experienced muscle aches   started exercise at the gym   just came from walking   has been experiencing cold symptoms and associated dizziness with movement  Has had nasal congestion, facial pressure, runny nose, pnd, cough on and off    Has left anterior chest wall pain, described as a bruise   she has had this for 3 weeks on and off    Review of Systems   Review of Systems   Constitutional: Negative for chills and fever  HENT: Positive for congestion, postnasal drip and rhinorrhea  Respiratory: Positive for cough  Neurological: Negative for dizziness, light-headedness and headaches  Active Problem List     Patient Active Problem List   Diagnosis    Benign essential hypertension    Dyslipidemia    Type 2 diabetes mellitus ([address] )    Osteoarthritis of both knees    Obstructive sleep apnea    Obesity (BMI 30-39  9)    Hypovitaminosis D    Hemorrhoid    Hypertension    Hyperlipidemia    Routine health maintenance    Sinusitis    Lump of skin of right upper extremity    Lipoma    Elevated hemoglobin (HCC)    Chest wall tenderness    Nonspecific abnormal electrocardiogram (ECG) (EKG)       Past Medical History:  Past Medical History:   Diagnosis Date    Adhesive capsulitis of right shoulder     LAST ASSESSED 10/12/18     2 para 2     Hypertension     Nephrolithiasis     Kidney Stones    Prediabetes     LAST ASSESSED 17       Past Surgical History:  Past Surgical History:   Procedure Laterality Date    ABDOMINAL HYSTERECTOMY      CATARACT EXTRACTION      CHOLECYSTECTOMY      GALLBLADDER SURGERY      TONSILLECTOMY         Family History:  Family History   Problem Relation Age of Onset    Alcohol abuse Mother    [facility] Hypertension Mother     Stroke Mother     Breast cancer Sister        Social History:  Social History     Social History    Marital status: /Civil Union     Spouse name: N/A    Number of children: 2    Years of education: N/A     Occupational History    RETIRED      Social History Main Topics    Smoking status: Never Smoker    Smokeless tobacco: Never Used    Alcohol use No    Drug use: No    Sexual activity: Not Currently     Other Topics Concern    Not on file     Social History Narrative    CAFFEINE USE     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         I have reviewed the patient's medical history in detail; there are no changes to the history as noted in the electronic medical record  Objective     Vitals:    01/21/19 0912   BP: 146/80   Pulse:    Resp:      Wt Readings from Last 3 Encounters:   01/22/19 81 6 kg (180 lb)   01/21/19 82 3 kg (181 lb 6 4 oz)   12/28/18 81 9 kg (180 lb 9 6 oz)     Vitals:    01/21/19 0826 01/21/19 0912   BP: 148/92 146/80   Pulse: 72    Resp: 16    Weight: 82 3 kg (181 lb 6 4 oz)    Height: 5' 1" (1 549 m)        Physical Exam   Constitutional: She is oriented to person, place, and time  She appears well-developed and well-nourished  HENT:   Head: Normocephalic and atraumatic  Mouth/Throat: Oropharynx is clear and moist    TMs intact and clear  Nares with mild edema noted  Posterior pharynx with minimal drainage  Tender palpation of maxillary sinuses bilaterally   Eyes: Pupils are equal, round, and reactive to light  Conjunctivae and EOM are normal    Neck: Normal range of motion  Neck supple  Cardiovascular: Normal rate, regular rhythm and normal heart sounds  Pulmonary/Chest: Effort normal and breath sounds normal    Musculoskeletal: Normal range of motion  She exhibits no edema  Tender palpation of left anterior chest wall   Lymphadenopathy:     She has no cervical adenopathy  Neurological: She is alert and oriented to person, place, and time  Psychiatric: She has a normal mood and affect  Nursing note and vitals reviewed        Pertinent Laboratory/Diagnostic Studies:  Lab Results   Component Value Date    GLUCOSE 101 02/12/2014    BUN 14 12/22/2018    CREATININE 1 10 (H) 01/11/2018    CALCIUM 9 2 12/22/2018     01/11/2018    K 3 8 12/22/2018    CO2 30 12/22/2018     12/22/2018     Lab Results   Component Value Date    ALT 20 01/11/2018    AST 16 01/11/2018    ALKPHOS 76 12/22/2018    BILITOT 0 5 01/11/2018       Lab Results   Component Value Date    WBC 6 1 01/16/2019    HGB 15 7 (H) 01/16/2019    HCT 46 3 (H) 01/16/2019    MCV 89 2 01/16/2019     01/16/2019       Lab Results   Component Value Date    TSH 1 26 12/11/2018       Lab Results   Component Value Date    CHOL 193 01/11/2018     Lab Results   Component Value Date    TRIG 124 12/11/2018     Lab Results   Component Value Date    HDL 61 12/11/2018     No results found for: [facility]  Lab Results   Component Value Date    HGBA1C 6 2 (H) 12/22/2018       Results for orders placed or performed in visit on 01/16/19   CBC and differential   Result Value Ref Range    White Blood Cell Count 6 1 3 8 - 10 8 Thousand/uL    Red Blood Cell Count 5 19 (H) 3 80 - 5 10 Million/uL    Hemoglobin 15 7 (H) 11 7 - 15 5 g/dL    HCT 46 3 (H) 35 0 - 45 0 %    MCV 89 2 80 0 - 100 0 fL    MCH 30 3 27 0 - 33 0 pg    MCHC 33 9 32 0 - 36 0 g/dL    RDW 12 9 11 0 - 15 0 %    Platelet Count 710 262 - 400 Thousand/uL    SL AMB MPV 12 1 7 5 - 12 5 fL    Neutrophils (Absolute) 3,434 1,500 - 7,800 cells/uL    Lymphocytes (Absolute) 1,739 850 - 3,900 cells/uL    Monocytes (Absolute) 659 200 - 950 cells/uL    Eosinophils (Absolute) 207 15 - 500 cells/uL    Basophils ABS 61 0 - 200 cells/uL    Neutrophils 56 3 %    Lymphocytes 28 5 %    Monocytes 10 8 %    Eosinophils 3 4 %    Basophils PCT 1 0 %       Orders Placed This Encounter   Procedures    Troponin I    Echo stress test w contrast if indicated    POCT ECG  Echo complete with contrast if indicated       ALLERGIES:  Allergies   Allergen Reactions    Sulfa Antibiotics Myalgia     Other reaction(s): severe joint pain  Action Taken: joint pain and stiffness;     Sulfamethopyrazine      Joint stiffness    Sulfamethoxazole-Trimethoprim Other (See Comments)     Joint pain       Current Medications     Current Outpatient Prescriptions   Medication Sig Dispense Refill    amLODIPine (NORVASC) 10 mg tablet TAKE 1 TABLET BY MOUTH DAILY 90 tablet 3    Cholecalciferol (VITAMIN D3) 1000 units CAPS Take by mouth      famotidine (PEPCID) 10 mg tablet Take 10 mg by mouth 2 (two) times a day      Flaxseed, Linseed, (FLAX SEED OIL) 1000 MG CAPS Take 1 capsule (1,000 mg total) by mouth daily 30 capsule 0    fluticasone (FLONASE) 50 mcg/act nasal spray 2 sprays into each nostril daily      Lactobacillus (PROBIOTIC ACIDOPHILUS) CAPS Take 1 tablet daily as supplement   lisinopril (ZESTRIL) 40 mg tablet Take 1 tablet (40 mg total) by mouth daily 90 tablet 3    loratadine (CLARITIN) 10 mg tablet Take 1 tablet by mouth daily as needed      metoprolol tartrate (LOPRESSOR) 25 mg tablet Take 1 tablet (25 mg total) by mouth every 12 (twelve) hours 60 tablet 3    Red Yeast Rice 600 MG CAPS Take 1 capsule (600 mg total) by mouth 2 (two) times a day 60 capsule 0    repaglinide (PRANDIN) 0 5 mg tablet Take 1 tablet (0 5 mg total) by mouth daily 180 tablet 0    amoxicillin (AMOXIL) 500 mg capsule Take 1 capsule (500 mg total) by mouth every 12 (twelve) hours for 7 days 14 capsule 0     No current facility-administered medications for this visit            Health Maintenance     Health Maintenance   Topic Date Due    Hepatitis C Screening  03/30/2019 (Originally 1955)    HEMOGLOBIN A1C  06/22/2019    DM Eye Exam  07/18/2019    Diabetic Foot Exam  12/21/2019    Fall Risk  12/28/2019    Depression Screening PHQ  12/28/2019    Urinary Incontinence Screening  12/28/2019    Medicare Annual Wellness Visit (AWV)  12/28/2019    Pneumococcal PPSV23/PCV13 65+ Years / High and Highest Risk (2 of 2 - PPSV23) 07/12/2021    CRC Screening: Colonoscopy  09/15/2025    DTaP,Tdap,and Td Vaccines (2 - Td) 07/17/2028    INFLUENZA VACCINE  Completed     Immunization History   Administered Date(s) Administered    Influenza 10/18/2018    Influenza Quadrivalent Preservative Free 3 years and older IM 10/04/2017    Influenza TIV (IM) 09/01/2014, 01/05/2016, 10/26/2016    Pneumococcal Conjugate 13-Valent 12/18/2018    Pneumococcal Polysaccharide PPV23 07/12/2016    Tdap 07/17/2018    Zoster 01/01/2014, 08/30/2016       Otis Campos MD

## 2019-01-22 ENCOUNTER — CONSULT (OUTPATIENT)
Dept: SURGERY | Facility: CLINIC | Age: 64
End: 2019-01-22
Payer: COMMERCIAL

## 2019-01-22 VITALS
HEIGHT: 61 IN | SYSTOLIC BLOOD PRESSURE: 144 MMHG | TEMPERATURE: 97.7 F | DIASTOLIC BLOOD PRESSURE: 90 MMHG | BODY MASS INDEX: 33.99 KG/M2 | WEIGHT: 180 LBS

## 2019-01-22 DIAGNOSIS — D17.9 LIPOMA, UNSPECIFIED SITE: ICD-10-CM

## 2019-01-22 DIAGNOSIS — R22.31 LUMP OF SKIN OF RIGHT UPPER EXTREMITY: ICD-10-CM

## 2019-01-22 PROBLEM — R07.89 CHEST WALL TENDERNESS: Status: ACTIVE | Noted: 2019-01-22

## 2019-01-22 PROBLEM — R94.31 NONSPECIFIC ABNORMAL ELECTROCARDIOGRAM (ECG) (EKG): Status: ACTIVE | Noted: 2019-01-22

## 2019-01-22 PROCEDURE — 24075 EXC ARM/ELBOW LES SC < 3 CM: CPT | Performed by: SURGERY

## 2019-01-22 PROCEDURE — 99202 OFFICE O/P NEW SF 15 MIN: CPT | Performed by: SURGERY

## 2019-01-22 PROCEDURE — 88304 TISSUE EXAM BY PATHOLOGIST: CPT | Performed by: PATHOLOGY

## 2019-01-22 NOTE — LETTER
2019     [address] [place] [address]    Patient: Dee Boggs   YOB: 1955   Date of Visit: 2019       Dear Dr Zeferino Tsang:    Thank you for referring Bettina De Santiago to me for evaluation  Below are my notes for this consultation  If you have questions, please do not hesitate to call me  I look forward to following your patient along with you  Sincerely,        Luis Manuel White MD        CC: No Recipients  Luis Manuel White MD  2019  1:02 PM  Sign at close encounter  Office Visit - General Surgery  Dee Boggs MRN: [id number]  Encounter: [id number]    Assessment and Plan    Problem List Items Addressed This Visit        Other    Lump of skin of right upper extremity    Lipoma     What appears to be a lipoma was removed from the right upper arm without difficulty  Will give her call with the path report when available  She was given instructions on care and activity  See us back if needed  Relevant Orders    Tissue Exam          Chief Complaint:  Dee Boggs is a 70 y o  female who presents for Mass (of Right upper arm)    Subjective  49-year-old female with a mass in the right upper arm  She has had this for some time  She is concerned because her daughter had a mass which turned out to be a rare tumor    She otherwise has no other complaints about this    Past Medical History  Past Medical History:   Diagnosis Date    Adhesive capsulitis of right shoulder     LAST ASSESSED 10/12/18     2 para 2     Hypertension     Nephrolithiasis     Kidney Stones    Prediabetes     LAST ASSESSED 17       Past Surgical History  Past Surgical History:   Procedure Laterality Date    ABDOMINAL HYSTERECTOMY      CATARACT EXTRACTION      CHOLECYSTECTOMY      GALLBLADDER SURGERY      TONSILLECTOMY         Family History  Family History   Problem Relation Age of Onset    Alcohol abuse Mother     Hypertension Mother  Stroke Mother     Breast cancer Sister        Medications  Current Outpatient Prescriptions on File Prior to Visit   Medication Sig Dispense Refill    amLODIPine (NORVASC) 10 mg tablet TAKE 1 TABLET BY MOUTH DAILY 90 tablet 3    amoxicillin (AMOXIL) 500 mg capsule Take 1 capsule (500 mg total) by mouth every 12 (twelve) hours for 7 days 14 capsule 0    Cholecalciferol (VITAMIN D3) 1000 units CAPS Take by mouth      famotidine (PEPCID) 10 mg tablet Take 10 mg by mouth 2 (two) times a day      Flaxseed, Linseed, (FLAX SEED OIL) 1000 MG CAPS Take 1 capsule (1,000 mg total) by mouth daily 30 capsule 0    fluticasone (FLONASE) 50 mcg/act nasal spray 2 sprays into each nostril daily      Lactobacillus (PROBIOTIC ACIDOPHILUS) CAPS Take 1 tablet daily as supplement   lisinopril (ZESTRIL) 40 mg tablet Take 1 tablet (40 mg total) by mouth daily 90 tablet 3    metoprolol tartrate (LOPRESSOR) 25 mg tablet Take 1 tablet (25 mg total) by mouth every 12 (twelve) hours 60 tablet 3    Red Yeast Rice 600 MG CAPS Take 1 capsule (600 mg total) by mouth 2 (two) times a day 60 capsule 0    repaglinide (PRANDIN) 0 5 mg tablet Take 1 tablet (0 5 mg total) by mouth daily 180 tablet 0    loratadine (CLARITIN) 10 mg tablet Take 1 tablet by mouth daily as needed       No current facility-administered medications on file prior to visit  Allergies  Allergies   Allergen Reactions    Sulfa Antibiotics Myalgia     Other reaction(s): severe joint pain  Action Taken: joint pain and stiffness;     Sulfamethopyrazine      Joint stiffness    Sulfamethoxazole-Trimethoprim Other (See Comments)     Joint pain       Review of Systems    Objective  Vitals:    01/22/19 1029   BP: 144/90   Temp: 97 7 °F (36 5 °C)       Physical Exam   Extremities:  Right upper arm laterally is about a 1 cm soft subcutaneous mass  There are no skin changes overlying this      Procedures  After permission, patient's right upper arm was prepped and draped in usual sterile fashion  Time-out taken  Local anesthesia 1% lidocaine with epi was infiltrated into the skin and subcutaneous tissue  Knife was used incision made through the skin into subcutaneous tissue  Dissecting into the fatty tissue 1 2 cm fatty mass was identified and removed  The skin was closed with subcuticular 4 Monocryl  Band-Aid dressing applied  Tolerated procedure well

## 2019-01-22 NOTE — PROGRESS NOTES
Office Visit - General Surgery  Jm Mitchell MRN: [id number]  Encounter: [id number]    Assessment and Plan    Problem List Items Addressed This Visit        Other    Lump of skin of right upper extremity    Lipoma     What appears to be a lipoma was removed from the right upper arm without difficulty  Will give her call with the path report when available  She was given instructions on care and activity  See us back if needed  Relevant Orders    Tissue Exam          Chief Complaint:  Jm Mitchell is a 70 y o  female who presents for Mass (of Right upper arm)    Subjective  56-year-old female with a mass in the right upper arm  She has had this for some time  She is concerned because her daughter had a mass which turned out to be a rare tumor    She otherwise has no other complaints about this    Past Medical History  Past Medical History:   Diagnosis Date    Adhesive capsulitis of right shoulder     LAST ASSESSED 10/12/18     2 para 2     Hypertension     Nephrolithiasis     Kidney Stones    Prediabetes     LAST ASSESSED 17       Past Surgical History  Past Surgical History:   Procedure Laterality Date    ABDOMINAL HYSTERECTOMY      CATARACT EXTRACTION      CHOLECYSTECTOMY      GALLBLADDER SURGERY      TONSILLECTOMY         Family History  Family History   Problem Relation Age of Onset    Alcohol abuse Mother     Hypertension Mother     Stroke Mother     Breast cancer Sister        Medications  Current Outpatient Prescriptions on File Prior to Visit   Medication Sig Dispense Refill    amLODIPine (NORVASC) 10 mg tablet TAKE 1 TABLET BY MOUTH DAILY 90 tablet 3    amoxicillin (AMOXIL) 500 mg capsule Take 1 capsule (500 mg total) by mouth every 12 (twelve) hours for 7 days 14 capsule 0    Cholecalciferol (VITAMIN D3) 1000 units CAPS Take by mouth      famotidine (PEPCID) 10 mg tablet Take 10 mg by mouth 2 (two) times a day      Flaxseed, Linseed, (FLAX SEED OIL) 1000 MG CAPS Take 1 capsule (1,000 mg total) by mouth daily 30 capsule 0    fluticasone (FLONASE) 50 mcg/act nasal spray 2 sprays into each nostril daily      Lactobacillus (PROBIOTIC ACIDOPHILUS) CAPS Take 1 tablet daily as supplement   lisinopril (ZESTRIL) 40 mg tablet Take 1 tablet (40 mg total) by mouth daily 90 tablet 3    metoprolol tartrate (LOPRESSOR) 25 mg tablet Take 1 tablet (25 mg total) by mouth every 12 (twelve) hours 60 tablet 3    Red Yeast Rice 600 MG CAPS Take 1 capsule (600 mg total) by mouth 2 (two) times a day 60 capsule 0    repaglinide (PRANDIN) 0 5 mg tablet Take 1 tablet (0 5 mg total) by mouth daily 180 tablet 0    loratadine (CLARITIN) 10 mg tablet Take 1 tablet by mouth daily as needed       No current facility-administered medications on file prior to visit  Allergies  Allergies   Allergen Reactions    Sulfa Antibiotics Myalgia     Other reaction(s): severe joint pain  Action Taken: joint pain and stiffness;     Sulfamethopyrazine      Joint stiffness    Sulfamethoxazole-Trimethoprim Other (See Comments)     Joint pain       Review of Systems    Objective  Vitals:    01/22/19 1029   BP: 144/90   Temp: 97 7 °F (36 5 °C)       Physical Exam   Extremities:  Right upper arm laterally is about a 1 cm soft subcutaneous mass  There are no skin changes overlying this  Procedures  After permission, patient's right upper arm was prepped and draped in usual sterile fashion  Time-out taken  Local anesthesia 1% lidocaine with epi was infiltrated into the skin and subcutaneous tissue  Knife was used incision made through the skin into subcutaneous tissue  Dissecting into the fatty tissue 1 2 cm fatty mass was identified and removed  The skin was closed with subcuticular 4 Monocryl  Band-Aid dressing applied  Tolerated procedure well

## 2019-01-22 NOTE — ASSESSMENT & PLAN NOTE
What appears to be a lipoma was removed from the right upper arm without difficulty  Will give her call with the path report when available  She was given instructions on care and activity  See us back if needed

## 2019-01-24 ENCOUNTER — LAB (OUTPATIENT)
Dept: LAB | Facility: HOSPITAL | Age: 64
End: 2019-01-24
Payer: COMMERCIAL

## 2019-01-24 ENCOUNTER — TRANSCRIBE ORDERS (OUTPATIENT)
Dept: LAB | Facility: HOSPITAL | Age: 64
End: 2019-01-24

## 2019-01-24 ENCOUNTER — TELEPHONE (OUTPATIENT)
Dept: FAMILY MEDICINE CLINIC | Facility: CLINIC | Age: 64
End: 2019-01-24

## 2019-01-24 DIAGNOSIS — R07.89 CHEST WALL TENDERNESS: ICD-10-CM

## 2019-01-24 LAB — TROPONIN I SERPL-MCNC: <0.02 NG/ML

## 2019-01-24 PROCEDURE — 84484 ASSAY OF TROPONIN QUANT: CPT

## 2019-01-24 PROCEDURE — 36415 COLL VENOUS BLD VENIPUNCTURE: CPT

## 2019-01-24 NOTE — TELEPHONE ENCOUNTER
----- Message from Guillermo Hsu MD sent at 1/24/2019 11:25 AM EST -----  Can you please let patient know that her blood test was normal   Thank you

## 2019-01-28 ENCOUNTER — TELEPHONE (OUTPATIENT)
Dept: SURGERY | Facility: CLINIC | Age: 64
End: 2019-01-28

## 2019-02-05 ENCOUNTER — CONSULT (OUTPATIENT)
Dept: HEMATOLOGY ONCOLOGY | Facility: CLINIC | Age: 64
End: 2019-02-05
Payer: COMMERCIAL

## 2019-02-05 VITALS
SYSTOLIC BLOOD PRESSURE: 136 MMHG | DIASTOLIC BLOOD PRESSURE: 80 MMHG | WEIGHT: 184 LBS | HEART RATE: 61 BPM | OXYGEN SATURATION: 98 % | HEIGHT: 62 IN | TEMPERATURE: 97.6 F | BODY MASS INDEX: 33.86 KG/M2 | RESPIRATION RATE: 18 BRPM

## 2019-02-05 DIAGNOSIS — D58.2 ELEVATED HEMOGLOBIN (HCC): Primary | ICD-10-CM

## 2019-02-05 DIAGNOSIS — G47.33 OBSTRUCTIVE SLEEP APNEA: ICD-10-CM

## 2019-02-05 PROCEDURE — 99204 OFFICE O/P NEW MOD 45 MIN: CPT | Performed by: INTERNAL MEDICINE

## 2019-02-05 NOTE — LETTER
2019     Renee Sanders [address] [place] [address]    Patient: Nicole Rodas   YOB: 1955   Date of Visit: 2019       Dear Dr Ann Du:    Thank you for referring Anneliese Saleh to me for evaluation  Below are my notes for this consultation  If you have questions, please do not hesitate to call me  I look forward to following your patient along with you  Sincerely,        Sylvia Hernandez MD        CC: No Recipients  Sylvia Hernandez MD  2019  9:17 AM  Sign at close encounter  Oncology Consult Note  Nicole Rodas 70 y o  female MRN: [id number]  Unit/Bed#:  Encounter: [id number]      Presenting Complaint:  Elevated hemoglobin    History of Presenting Illness:    68-year-old  female with history of hypertension, anxiety, obstructive sleep apnea, diabetes mellitus type 2, osteopenia/osteoporosis, GERD, vitamin-D deficiency was found to have mildly persistent elevation of hemoglobin and hematocrit    In 2019 WBC 6 1, hemoglobin 15 7, hematocrit 46 3, MCV 89, platelets [id number], 64% neutrophils, 28% lymphocytes, 10% monocytes, 3% eosinophiles    In 2018 WBC 6, hemoglobin 14 7, MCV 90, platelets [id number]    In 2014 WBC 7 5, hemoglobin 15 9, hematocrit 48, platelets [id number], 83% neutrophils, 28% lymphocytes, 12% monocytes    The patient is not compliant with CPAP machine    She is not on hormonal therapy, she started red yeast for in the summer of     Denies any fogginess, headache, dizziness, diplopia, floaters, chest pain, abdominal pain, dysuria, hematuria, melena, hematochezia, dyspnea, pruritus, night sweats    She does not smoke or drink    No family history of blood disorders        Review of Systems - As stated in the HPI otherwise the fourteen point review of systems was negative      Past Medical History:   Diagnosis Date    Adhesive capsulitis of right shoulder     LAST ASSESSED 10/12/18     2 para 2     Hypertension     Nephrolithiasis     Kidney Stones    Prediabetes     LAST ASSESSED 8/21/17       Social History     Social History    Marital status: /Civil Union     Spouse name: N/A    Number of children: 2    Years of education: N/A     Occupational History    RETIRED      Social History Main Topics    Smoking status: Never Smoker    Smokeless tobacco: Never Used    Alcohol use No    Drug use: No    Sexual activity: Not Currently     Other Topics Concern    Not on file     Social History Narrative    CAFFEINE USE     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           Family History   Problem Relation Age of Onset    Alcohol abuse Mother     Hypertension Mother     Stroke Mother     Breast cancer Sister        Allergies   Allergen Reactions    Sulfa Antibiotics Myalgia     Other reaction(s): severe joint pain  Action Taken: joint pain and stiffness;     Sulfamethopyrazine      Joint stiffness    Sulfamethoxazole-Trimethoprim Other (See Comments)     Joint pain         Current Outpatient Prescriptions:     amLODIPine (NORVASC) 10 mg tablet, TAKE 1 TABLET BY MOUTH DAILY, Disp: 90 tablet, Rfl: 3    Cholecalciferol (VITAMIN D3) 1000 units CAPS, Take by mouth, Disp: , Rfl:     famotidine (PEPCID) 10 mg tablet, Take 10 mg by mouth 2 (two) times a day, Disp: , Rfl:     Flaxseed, Linseed, (FLAX SEED OIL) 1000 MG CAPS, Take 1 capsule (1,000 mg total) by mouth daily, Disp: 30 capsule, Rfl: 0    fluticasone (FLONASE) 50 mcg/act nasal spray, 2 sprays into each nostril daily, Disp: , Rfl:     Lactobacillus (PROBIOTIC ACIDOPHILUS) CAPS, Take 1 tablet daily as supplement  , Disp: , Rfl:     lisinopril (ZESTRIL) 40 mg tablet, Take 1 tablet (40 mg total) by mouth daily, Disp: 90 tablet, Rfl: 3    loratadine (CLARITIN) 10 mg tablet, Take 1 tablet by mouth daily as needed, Disp: , Rfl:     metoprolol tartrate (LOPRESSOR) 25 mg tablet, Take 1 tablet (25 mg total) by mouth every 12 (twelve) hours, Disp: 60 tablet, Rfl: 3    Red Yeast Rice 600 MG CAPS, Take 1 capsule (600 mg total) by mouth 2 (two) times a day, Disp: 60 capsule, Rfl: 0    repaglinide (PRANDIN) 0 5 mg tablet, Take 1 tablet (0 5 mg total) by mouth daily, Disp: 180 tablet, Rfl: 0      /80   Pulse 61   Temp 97 6 °F (36 4 °C) (Tympanic)   Resp 18   Ht 5' 1 5" (1 562 m)   Wt 83 5 kg (184 lb)   SpO2 98%   BMI 34 20 kg/m²        General Appearance:    Alert, oriented        Eyes:    PERRL, injected conjunctivae   Ears:    Normal external ear canals, both ears   Nose:   Nares normal, septum midline   Throat:   Mucosa moist  Pharynx without injection  Neck:   Supple       Lungs:     Clear to auscultation bilaterally   Chest Wall:    No tenderness or deformity    Heart:    Regular rate and rhythm       Abdomen:     Soft, non-tender, bowel sounds +, no organomegaly           Extremities:   Extremities no cyanosis or edema       Skin:   no rash or icterus  Lymph nodes:   Cervical, supraclavicular, and axillary nodes normal   Neurologic:   CNII-XII intact, normal strength, sensation and reflexes     Throughout               No results found for this or any previous visit (from the past 48 hour(s))  Dxa Bone Density Spine Hip And Pelvis    Result Date: 1/8/2019  Narrative: CENTRAL  DXA SCAN CLINICAL HISTORY:   70year old post-menopausal  female  Hypovitaminosis D  Type II diabetes  Early menopause  Z13 820: Encounter for screening for osteoporosis  TECHNIQUE: Bone densitometry was performed using a Horizon A bone densitometer  Regions of interest appear properly placed  There are no obvious fractures or other confounding variables which could limit the study  COMPARISON:  8/21/2003   RESULTS: LUMBAR SPINE:  L1-L4: BMD 0 789 gm/cm2 T-score -2 3 Z-score -0 6 LEFT TOTAL HIP: BMD 0 888 gm/cm2 T-score -0 4 Z-score 0 8 LEFT FEMORAL NECK: BMD 0 756 gm/cm2 T-score -0 8 Z-score 0 7 RIGHT TOTAL HIP: BMD 0 833 gm/cm2 T-score -0 9 Z-score 0  3 RIGHT FEMORAL NECK: BMD 0 730 gm/cm2 T-score -1 1 Z-score 0 4     Impression: 1  Based on the [facility] classification, the T-score of -2 3 in the lumbar spine and -1 1 in the right femoral neck are consistent with low bone mineral density  2   Since the prior study, there has been a significant decrease of the BMD in the lumbar spine of 0 136 gm/cm2 or 14 7%  In the right hip, there has been a significant decrease in BMD of 0 102 gm/cm2 10 9%  This decrease  does exceed our own least significant change and, therefore, is statistically significant within 95% confidence level  3   Any secondary causes of low bone mineral density should be excluded prior to treatment, if clinically indicated  4   A daily intake of at least 1200 mg calcium and 800 to 1000 IU of Vitamin D, as well as weight bearing and muscle strengthening exercise, fall prevention and avoidance of tobacco and excessive alcohol intake as basic preventive measures are suggested  5   Repeat DXA  in 18 - 24 months, on the same machine, as clinically indicated  The 10 year risk of hip fracture is 0 6%, with the 10 year risk of major osteoporotic fracture being 7 7%, as calculated by the [facility] fracture risk assessment tool (FRAX)  The current NOF guidelines recommend treating patients with FRAX 10 year risk score  of >3% for hip fracture and >20% for major osteoporotic fracture   WHO CLASSIFICATION: Normal (a T-score of -1 0 or higher) Low bone mineral density (a T-score of less than -1 0 but higher than -2 5) Osteoporosis (a T-score of -2 5 or less) Severe osteoporosis (a T-score of -2 5 or less with a fragility fracture)   Workstation performed: [id number]       Assessment and plan    Elevated hemoglobin and hematocrit in a patient who had obstructive sleep apnea, she has does not comply with the CPAP, the elevated hemoglobin confirm since February 2014 however it is not progressively getting worse this might argue against myeloproliferative disorders of the bone marrow    For the sake of completion will do JAK2 mutation, erythropoietin level, iron studies and ultrasound of the abdomen to rule out hepatic splenomegaly    No need for additional workup at this time decided the mentioned above    I will keep you updated follow-up in 1 month

## 2019-02-05 NOTE — PROGRESS NOTES
Oncology Consult Note  Calvin Simons 70 y o  female MRN: [id number]  Unit/Bed#:  Encounter: [id number]      Presenting Complaint:  Elevated hemoglobin    History of Presenting Illness:    61-year-old  female with history of hypertension, anxiety, obstructive sleep apnea, diabetes mellitus type 2, osteopenia/osteoporosis, GERD, vitamin-D deficiency was found to have mildly persistent elevation of hemoglobin and hematocrit    In 2019 WBC 6 1, hemoglobin 15 7, hematocrit 46 3, MCV 89, platelets [id number], 84% neutrophils, 28% lymphocytes, 10% monocytes, 3% eosinophiles    In 2018 WBC 6, hemoglobin 14 7, MCV 90, platelets [id number]    In 2014 WBC 7 5, hemoglobin 15 9, hematocrit 48, platelets 690623, 77% neutrophils, 28% lymphocytes, 12% monocytes    The patient is not compliant with CPAP machine    She is not on hormonal therapy, she started red yeast for in the summer of     Denies any fogginess, headache, dizziness, diplopia, floaters, chest pain, abdominal pain, dysuria, hematuria, melena, hematochezia, dyspnea, pruritus, night sweats    She does not smoke or drink    No family history of blood disorders        Review of Systems - As stated in the HPI otherwise the fourteen point review of systems was negative      Past Medical History:   Diagnosis Date    Adhesive capsulitis of right shoulder     LAST ASSESSED 10/12/18     2 para 2     Hypertension     Nephrolithiasis     Kidney Stones    Prediabetes     LAST ASSESSED 17       Social History     Social History    Marital status: /Civil Union     Spouse name: N/A    Number of children: 2    Years of education: N/A     Occupational History    RETIRED      Social History Main Topics    Smoking status: Never Smoker    Smokeless tobacco: Never Used    Alcohol use No    Drug use: No    Sexual activity: Not Currently     Other Topics Concern    Not on file     Social History Narrative    CAFFEINE USE 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           Family History   Problem Relation Age of Onset    Alcohol abuse Mother     Hypertension Mother     Stroke Mother     Breast cancer Sister        Allergies   Allergen Reactions    Sulfa Antibiotics Myalgia     Other reaction(s): severe joint pain  Action Taken: joint pain and stiffness;     Sulfamethopyrazine      Joint stiffness    Sulfamethoxazole-Trimethoprim Other (See Comments)     Joint pain         Current Outpatient Prescriptions:     amLODIPine (NORVASC) 10 mg tablet, TAKE 1 TABLET BY MOUTH DAILY, Disp: 90 tablet, Rfl: 3    Cholecalciferol (VITAMIN D3) 1000 units CAPS, Take by mouth, Disp: , Rfl:     famotidine (PEPCID) 10 mg tablet, Take 10 mg by mouth 2 (two) times a day, Disp: , Rfl:     Flaxseed, Linseed, (FLAX SEED OIL) 1000 MG CAPS, Take 1 capsule (1,000 mg total) by mouth daily, Disp: 30 capsule, Rfl: 0    fluticasone (FLONASE) 50 mcg/act nasal spray, 2 sprays into each nostril daily, Disp: , Rfl:     Lactobacillus (PROBIOTIC ACIDOPHILUS) CAPS, Take 1 tablet daily as supplement  , Disp: , Rfl:     lisinopril (ZESTRIL) 40 mg tablet, Take 1 tablet (40 mg total) by mouth daily, Disp: 90 tablet, Rfl: 3    loratadine (CLARITIN) 10 mg tablet, Take 1 tablet by mouth daily as needed, Disp: , Rfl:     metoprolol tartrate (LOPRESSOR) 25 mg tablet, Take 1 tablet (25 mg total) by mouth every 12 (twelve) hours, Disp: 60 tablet, Rfl: 3    Red Yeast Rice 600 MG CAPS, Take 1 capsule (600 mg total) by mouth 2 (two) times a day, Disp: 60 capsule, Rfl: 0    repaglinide (PRANDIN) 0 5 mg tablet, Take 1 tablet (0 5 mg total) by mouth daily, Disp: 180 tablet, Rfl: 0      /80   Pulse 61   Temp 97 6 °F (36 4 °C) (Tympanic)   Resp 18   Ht 5' 1 5" (1 562 m)   Wt 83 5 kg (184 lb)   SpO2 98%   BMI 34 20 kg/m²       General Appearance:    Alert, oriented        Eyes:    PERRL, injected conjunctivae   Ears:    Normal external ear canals, both ears   Nose: Nares normal, septum midline   Throat:   Mucosa moist  Pharynx without injection  Neck:   Supple       Lungs:     Clear to auscultation bilaterally   Chest Wall:    No tenderness or deformity    Heart:    Regular rate and rhythm       Abdomen:     Soft, non-tender, bowel sounds +, no organomegaly           Extremities:   Extremities no cyanosis or edema       Skin:   no rash or icterus  Lymph nodes:   Cervical, supraclavicular, and axillary nodes normal   Neurologic:   CNII-XII intact, normal strength, sensation and reflexes     Throughout               No results found for this or any previous visit (from the past 48 hour(s))  Dxa Bone Density Spine Hip And Pelvis    Result Date: 1/8/2019  Narrative: CENTRAL  DXA SCAN CLINICAL HISTORY:   70year old post-menopausal  female  Hypovitaminosis D  Type II diabetes  Early menopause  Z13 820: Encounter for screening for osteoporosis  TECHNIQUE: Bone densitometry was performed using a Horizon A bone densitometer  Regions of interest appear properly placed  There are no obvious fractures or other confounding variables which could limit the study  COMPARISON:  8/21/2003  RESULTS: LUMBAR SPINE:  L1-L4: BMD 0 789 gm/cm2 T-score -2 3 Z-score -0 6 LEFT TOTAL HIP: BMD 0 888 gm/cm2 T-score -0 4 Z-score 0 8 LEFT FEMORAL NECK: BMD 0 756 gm/cm2 T-score -0 8 Z-score 0 7 RIGHT TOTAL HIP: BMD 0 833 gm/cm2 T-score -0 9 Z-score 0 3 RIGHT FEMORAL NECK: BMD 0 730 gm/cm2 T-score -1 1 Z-score 0 4     Impression: 1  Based on the [facility] classification, the T-score of -2 3 in the lumbar spine and -1 1 in the right femoral neck are consistent with low bone mineral density  2   Since the prior study, there has been a significant decrease of the BMD in the lumbar spine of 0 136 gm/cm2 or 14 7%  In the right hip, there has been a significant decrease in BMD of 0 102 gm/cm2 10 9%    This decrease  does exceed our own least significant change and, therefore, is statistically significant within 95% confidence level  3   Any secondary causes of low bone mineral density should be excluded prior to treatment, if clinically indicated  4   A daily intake of at least 1200 mg calcium and 800 to 1000 IU of Vitamin D, as well as weight bearing and muscle strengthening exercise, fall prevention and avoidance of tobacco and excessive alcohol intake as basic preventive measures are suggested  5   Repeat DXA  in 18 - 24 months, on the same machine, as clinically indicated  The 10 year risk of hip fracture is 0 6%, with the 10 year risk of major osteoporotic fracture being 7 7%, as calculated by the [facility] fracture risk assessment tool (FRAX)  The current NOF guidelines recommend treating patients with FRAX 10 year risk score  of >3% for hip fracture and >20% for major osteoporotic fracture   WHO CLASSIFICATION: Normal (a T-score of -1 0 or higher) Low bone mineral density (a T-score of less than -1 0 but higher than -2 5) Osteoporosis (a T-score of -2 5 or less) Severe osteoporosis (a T-score of -2 5 or less with a fragility fracture)   Workstation performed: [id number]       Assessment and plan    Elevated hemoglobin and hematocrit in a patient who had obstructive sleep apnea, she has does not comply with the CPAP, the elevated hemoglobin confirm since February 2014 however it is not progressively getting worse this might argue against myeloproliferative disorders of the bone marrow    For the sake of completion will do JAK2 mutation, erythropoietin level, iron studies and ultrasound of the abdomen to rule out hepatic splenomegaly    No need for additional workup at this time decided the mentioned above    I will keep you updated follow-up in 1 month

## 2019-02-13 ENCOUNTER — HOSPITAL ENCOUNTER (OUTPATIENT)
Dept: NON INVASIVE DIAGNOSTICS | Facility: CLINIC | Age: 64
Discharge: HOME | End: 2019-02-13
Payer: COMMERCIAL

## 2019-02-13 DIAGNOSIS — J32.9 SINUSITIS, UNSPECIFIED CHRONICITY, UNSPECIFIED LOCATION: ICD-10-CM

## 2019-02-13 DIAGNOSIS — I10 HYPERTENSION, UNSPECIFIED TYPE: ICD-10-CM

## 2019-02-13 DIAGNOSIS — R94.31 NONSPECIFIC ABNORMAL ELECTROCARDIOGRAM (ECG) (EKG): ICD-10-CM

## 2019-02-13 DIAGNOSIS — R07.89 CHEST WALL TENDERNESS: ICD-10-CM

## 2019-02-13 PROCEDURE — 93350 STRESS TTE ONLY: CPT

## 2019-02-13 PROCEDURE — 93306 TTE W/DOPPLER COMPLETE: CPT | Performed by: INTERNAL MEDICINE

## 2019-02-13 PROCEDURE — 93306 TTE W/DOPPLER COMPLETE: CPT

## 2019-02-13 PROCEDURE — 93351 STRESS TTE COMPLETE: CPT | Performed by: INTERNAL MEDICINE

## 2019-02-14 ENCOUNTER — TELEPHONE (OUTPATIENT)
Dept: FAMILY MEDICINE CLINIC | Facility: CLINIC | Age: 64
End: 2019-02-14

## 2019-02-14 NOTE — TELEPHONE ENCOUNTER
----- Message from Maria Fernanda Bauer MD sent at 2/13/2019 10:29 PM EST -----  Can you please let patient know that her echocardiogram as well as echo stress test were normal   Thank you

## 2019-02-14 NOTE — RESULT ENCOUNTER NOTE
Can you please let patient know that her echocardiogram as well as echo stress test were normal   Thank you

## 2019-02-15 LAB
CHEST PAIN STATEMENT: NORMAL
MAX DIASTOLIC BP: 100 MMHG
MAX HEART RATE: 146 BPM
MAX PREDICTED HEART RATE: 155 BPM
MAX. SYSTOLIC BP: 194 MMHG
PROTOCOL NAME: NORMAL
REASON FOR TERMINATION: NORMAL
TARGET HR FORMULA: NORMAL
TEST INDICATION: NORMAL
TIME IN EXERCISE PHASE: NORMAL

## 2019-02-21 DIAGNOSIS — I10 BENIGN ESSENTIAL HYPERTENSION: ICD-10-CM

## 2019-02-21 RX ORDER — METOPROLOL SUCCINATE 50 MG/1
TABLET, EXTENDED RELEASE ORAL
Qty: 90 TABLET | Refills: 0 | OUTPATIENT
Start: 2019-02-21

## 2019-02-25 ENCOUNTER — OFFICE VISIT (OUTPATIENT)
Dept: CARDIOLOGY CLINIC | Facility: CLINIC | Age: 64
End: 2019-02-25
Payer: COMMERCIAL

## 2019-02-25 VITALS
WEIGHT: 183.7 LBS | HEART RATE: 79 BPM | DIASTOLIC BLOOD PRESSURE: 80 MMHG | BODY MASS INDEX: 33.8 KG/M2 | HEIGHT: 62 IN | SYSTOLIC BLOOD PRESSURE: 138 MMHG

## 2019-02-25 DIAGNOSIS — I10 BENIGN ESSENTIAL HYPERTENSION: ICD-10-CM

## 2019-02-25 DIAGNOSIS — I10 HYPERTENSION, UNSPECIFIED TYPE: ICD-10-CM

## 2019-02-25 DIAGNOSIS — E11.00 TYPE 2 DIABETES MELLITUS WITH HYPEROSMOLARITY WITHOUT COMA, WITHOUT LONG-TERM CURRENT USE OF INSULIN (HCC): ICD-10-CM

## 2019-02-25 DIAGNOSIS — R07.89 CHEST WALL TENDERNESS: Primary | ICD-10-CM

## 2019-02-25 DIAGNOSIS — E78.2 MIXED HYPERLIPIDEMIA: ICD-10-CM

## 2019-02-25 DIAGNOSIS — E78.5 DYSLIPIDEMIA: ICD-10-CM

## 2019-02-25 PROCEDURE — 99204 OFFICE O/P NEW MOD 45 MIN: CPT | Performed by: INTERNAL MEDICINE

## 2019-02-25 PROCEDURE — 93000 ELECTROCARDIOGRAM COMPLETE: CPT | Performed by: INTERNAL MEDICINE

## 2019-02-25 RX ORDER — METOPROLOL SUCCINATE 25 MG/1
25 TABLET, EXTENDED RELEASE ORAL 2 TIMES DAILY
COMMUNITY
End: 2019-09-26 | Stop reason: CLARIF

## 2019-02-25 NOTE — PROGRESS NOTES
[facility]  1955  [id number]  [address]    1  Chest wall tenderness     2  Hypertension, unspecified type  Ambulatory referral to Cardiology    POCT ECG   3  Benign essential hypertension     4  Dyslipidemia  VAS screening   5  Mixed hyperlipidemia     6  Type 2 diabetes mellitus with hyperosmolarity without coma, without long-term current use of insulin (HCC)         Discussion/Summary:  Her chest pain sounded atypical in nature  Stress echo was performed which showed no ischemia  I spent quite a bit of time talking to her about her cardiovascular risk  She does not want to take aspirin  We talked about cholesterol if she is a diabetic I would recommend that she start a statin  She would like to see where her numbers are and also we have checked a vascular screening she wants to see if there is any plaque starting in the arteries  I have ordered no further testing  She will come back in 1 year for a follow-up visit  Interval History:  68-year-old female presents for new patient evaluation for chest pain  She had an episode of soreness over the anterior chest wall  This was worse if she would touch the area or rub but with her hand  It was nonexertional   She had an abnormality on the EKG which was poor R-wave progression and sent for stress echo  This showed no ischemia  Formal transthoracic echo showed normal LV function  Blood pressures been controlled  She denies any chest pain, shortness of breath, lightheadedness, dizziness at this time  There has been no lower extremity edema, PND, orthopnea  Problem List     Benign essential hypertension    Dyslipidemia    Type 2 diabetes mellitus (HCC)    Lab Results   Component Value Date    HGBA1C 6 2 (H) 12/22/2018       No results for input(s): POCGLU in the last 72 hours      Blood Sugar Average: Last 72 hrs:          Osteoarthritis of both knees    Obstructive sleep apnea    Obesity (BMI 30-39  9)    Hypovitaminosis D    Hemorrhoid    Hypertension    Hyperlipidemia    Routine health maintenance    Sinusitis    Lump of skin of right upper extremity    Lipoma    Elevated hemoglobin (HCC)    Chest wall tenderness    Nonspecific abnormal electrocardiogram (ECG) (EKG)        Past Medical History:   Diagnosis Date    Adhesive capsulitis of right shoulder     LAST ASSESSED 10/12/18     2 para 2     Hypertension     Nephrolithiasis     Kidney Stones    Prediabetes     LAST ASSESSED 17     Social History     Socioeconomic History    Marital status: /Civil Union     Spouse name: Not on file    Number of children: 2    Years of education: Not on file    Highest education level: Not on file   Occupational History    Occupation: RETIRED   Social Needs    Financial resource strain: Not on file    Food insecurity:     Worry: Not on file     Inability: Not on file   [employer] needs:     Medical: Not on file     Non-medical: Not on file   Tobacco Use    Smoking status: Never Smoker    Smokeless tobacco: Never Used   Substance and Sexual Activity    Alcohol use: No    Drug use: No    Sexual activity: Not Currently   Lifestyle    Physical activity:     Days per week: Not on file     Minutes per session: Not on file    Stress: Not on file   Relationships    Social connections:     Talks on phone: Not on file     Gets together: Not on file     Attends Hinduism service: Not on file     Active member of club or organization: Not on file     Attends meetings of clubs or organizations: Not on file     Relationship status: Not on file    Intimate partner violence:     Fear of current or ex partner: Not on file     Emotionally abused: Not on file     Physically abused: Not on file     Forced sexual activity: Not on file   Other Topics Concern    Not on file   Social History Narrative    CAFFEINE USE     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      Family History   Problem Relation Age of Onset    Alcohol abuse Mother     Hypertension Mother     Stroke Mother     Breast cancer Sister      Past Surgical History:   Procedure Laterality Date    ABDOMINAL HYSTERECTOMY      CATARACT EXTRACTION      CHOLECYSTECTOMY      GALLBLADDER SURGERY      TONSILLECTOMY         Current Outpatient Medications:     amLODIPine (NORVASC) 10 mg tablet, TAKE 1 TABLET BY MOUTH DAILY, Disp: 90 tablet, Rfl: 3    Cholecalciferol (VITAMIN D3) 1000 units CAPS, Take by mouth, Disp: , Rfl:     famotidine (PEPCID) 10 mg tablet, Take 10 mg by mouth 2 (two) times a day, Disp: , Rfl:     Flaxseed, Linseed, (FLAX SEED OIL) 1000 MG CAPS, Take 1 capsule (1,000 mg total) by mouth daily, Disp: 30 capsule, Rfl: 0    fluticasone (FLONASE) 50 mcg/act nasal spray, 2 sprays into each nostril daily, Disp: , Rfl:     Lactobacillus (PROBIOTIC ACIDOPHILUS) CAPS, Take 1 tablet daily as supplement  , Disp: , Rfl:     lisinopril (ZESTRIL) 40 mg tablet, Take 1 tablet (40 mg total) by mouth daily, Disp: 90 tablet, Rfl: 3    loratadine (CLARITIN) 10 mg tablet, Take 1 tablet by mouth daily as needed, Disp: , Rfl:     metoprolol succinate (TOPROL-XL) 25 mg 24 hr tablet, Take 25 mg by mouth daily, Disp: , Rfl:     repaglinide (PRANDIN) 0 5 mg tablet, Take 1 tablet (0 5 mg total) by mouth daily, Disp: 180 tablet, Rfl: 0    Red Yeast Rice 600 MG CAPS, Take 1 capsule (600 mg total) by mouth 2 (two) times a day (Patient not taking: Reported on 2/25/2019), Disp: 60 capsule, Rfl: 0  Allergies   Allergen Reactions    Sulfa Antibiotics Myalgia     Other reaction(s): severe joint pain  Action Taken: joint pain and stiffness;     Sulfamethopyrazine      Joint stiffness    Sulfamethoxazole-Trimethoprim Other (See Comments)     Joint pain       Labs:     Chemistry        Component Value Date/Time     01/11/2018 0827    K 3 8 12/22/2018 0934     12/22/2018 0934    CO2 30 12/22/2018 0934    BUN 14 12/22/2018 0934    CREATININE 1 10 (H) 01/11/2018 0827        Component Value Date/Time    CALCIUM 9 2 12/22/2018 0934    ALKPHOS 76 12/22/2018 0934    AST 13 12/22/2018 0934    ALT 15 12/22/2018 0934    BILITOT 0 5 01/11/2018 0827            Lab Results   Component Value Date    CHOL 193 01/11/2018     Lab Results   Component Value Date    HDL 61 12/11/2018    HDL 61 04/16/2018    HDL 58 01/11/2018     No results found for: [facility]  Lab Results   Component Value Date    TRIG 124 12/11/2018    TRIG 128 04/16/2018    TRIG 88 01/11/2018     No results found for: CHOLHDL    Imaging: No results found  ECG:  Normal sinus rhythm poor R-wave progression      Review of Systems   Constitution: Negative  HENT: Negative  Eyes: Negative  Cardiovascular: Negative  Respiratory: Negative  Endocrine: Negative  Hematologic/Lymphatic: Negative  Skin: Negative  Musculoskeletal: Negative  Gastrointestinal: Negative  Genitourinary: Negative  Neurological: Negative  Psychiatric/Behavioral: Negative  Vitals:    02/25/19 0938   BP: 138/80   Pulse: 79     Vitals:    02/25/19 0938   Weight: 83 3 kg (183 lb 11 2 oz)     Height: 5' 1 5" (156 2 cm)   Body mass index is 34 15 kg/m²  Physical Exam:  Vital signs reviewed    General appearance:  Appears stated age, alert, well appearing and in no distress  HEENT:  PERRLA, EOMI, no scleral icterus, no conjunctival pallor  NECK:  Supple, No elevated JVP, no thyromegaly, no carotid bruits  HEART:  Regular rate and rhythm, normal S1/S2, no S3/S4, no murmur or rub  LUNGS:  Clear to auscultation bilaterally no wheezes rales or rhonchi  ABDOMEN:  Soft, non-tender, positive bowel sounds, no rebound or guarding, no organomegaly   EXTREMITIES:  No edema no clubbing cyanosis good range of motion  VASCULAR:  Normal pedal pulses   SKIN: No lesions or rashes on exposed skin  NEURO:  CN II-XII intact, no focal deficits

## 2019-02-26 ENCOUNTER — HOSPITAL ENCOUNTER (OUTPATIENT)
Dept: ULTRASOUND IMAGING | Facility: HOSPITAL | Age: 64
Discharge: HOME | End: 2019-02-26
Attending: INTERNAL MEDICINE
Payer: COMMERCIAL

## 2019-02-26 DIAGNOSIS — D75.1 POLYCYTHEMIA: ICD-10-CM

## 2019-02-26 DIAGNOSIS — G47.33 OBSTRUCTIVE SLEEP APNEA: ICD-10-CM

## 2019-02-26 DIAGNOSIS — D58.2 ELEVATED HEMOGLOBIN (HCC): ICD-10-CM

## 2019-02-26 PROCEDURE — 76700 US EXAM ABDOM COMPLETE: CPT

## 2019-03-01 DIAGNOSIS — I10 HYPERTENSION, UNSPECIFIED TYPE: Primary | ICD-10-CM

## 2019-03-01 NOTE — PROGRESS NOTES
Hematology/Oncology Outpatient Follow- up Note  Anya Vazquez 70 y o  female MRN: @ Encounter: [id number]        Date:  3/5/2019      Assessment / Plan:    Elevated hemoglobin and hematocrit in a patient, stable since at least 2014 in a patient who has obstructive sleep apnea, unable to tolerate CPAP       JAK2 mutation negative, erythropoietin level normal at 8 9,  iron saturation normal at 27% and ultrasound of the abdomen did not identify splenomegaly  Right hydronephrosis with renal atrophy  She has a history of recurrent B renal calculi on separate occassion     We reviewed her labs and ultrasound  At this time, we will see her p r whitley Cao   Should there be any changes, she is encouraged to follow up          HPI:  57-year-old  female with history of hypertension, anxiety, obstructive sleep apnea, diabetes mellitus type 2, osteopenia/osteoporosis, GERD, vitamin-D deficiency was found to have mildly persistent elevation of hemoglobin and hematocrit     In January 2019 WBC 6 1, hemoglobin 15 7, hematocrit 46 3, MCV 89, platelets [id number], 67% neutrophils, 28% lymphocytes, 10% monocytes, 3% eosinophiles     In January 2018 WBC 6, hemoglobin 14 7, MCV 90, platelets [id number]     In February 2014 WBC 7 5, hemoglobin 15 9, hematocrit 48, platelets [id number], 65% neutrophils, 28% lymphocytes, 12% monocytes     The patient is not compliant with CPAP machine     She is not on hormonal therapy, she started red yeast for in the summer of 2018 and now has discontinued      Denies any fogginess, headache, dizziness, diplopia, floaters, chest pain, abdominal pain, dysuria, hematuria, melena, hematochezia, dyspnea, pruritus, night sweats     She does not smoke or drink     No family history of blood disorders        Test Results:        Labs:   Lab Results   Component Value Date    HGB 15 7 (H) 01/16/2019    HCT 46 3 (H) 01/16/2019    MCV 89 2 01/16/2019     01/16/2019    WBC 6 1 01/16/2019     Lab Results Component Value Date     01/11/2018    K 3 8 12/22/2018     12/22/2018    CO2 30 12/22/2018    ANIONGAP 10 02/12/2014    BUN 14 12/22/2018    CREATININE 1 10 (H) 01/11/2018    GLUCOSE 101 02/12/2014    CALCIUM 9 2 12/22/2018    AST 13 12/22/2018    ALT 15 12/22/2018    ALKPHOS 76 12/22/2018    PROT 6 9 01/11/2018    BILITOT 0 5 01/11/2018       Imaging: No results found  ROS:  As mentioned in HPI & Interval History otherwise 14 point ROS negative  Allergies: Allergies   Allergen Reactions    Sulfa Antibiotics Myalgia     Other reaction(s): severe joint pain  Action Taken: joint pain and stiffness;     Sulfamethopyrazine      Joint stiffness    Sulfamethoxazole-Trimethoprim Other (See Comments)     Joint pain     Current Medications: Reviewed  PMH/FH/SH:  Reviewed      Physical Exam:    There is no height or weight on file to calculate BSA  Ht Readings from Last 3 Encounters:   02/25/19 5' 1 5" (1 562 m)   02/05/19 5' 1 5" (1 562 m)   01/22/19 5' 1" (1 549 m)        Wt Readings from Last 3 Encounters:   02/25/19 83 3 kg (183 lb 11 2 oz)   02/05/19 83 5 kg (184 lb)   01/22/19 81 6 kg (180 lb)        Temp Readings from Last 3 Encounters:   02/05/19 97 6 °F (36 4 °C) (Tympanic)   01/22/19 97 7 °F (36 5 °C) (Tympanic)   12/28/18 98 °F (36 7 °C) (Tympanic)        BP Readings from Last 3 Encounters:   02/25/19 138/80   02/05/19 136/80   01/22/19 144/90           Physical Exam   Constitutional: She appears well-developed and well-nourished  Eyes: Pupils are equal, round, and reactive to light  Neck: Normal range of motion  Neck supple  Cardiovascular: Normal rate and regular rhythm  Pulmonary/Chest: Effort normal    Abdominal: Soft  Skin: Skin is warm and dry  Psychiatric: She has a normal mood and affect   Her behavior is normal        ECOG:       Emergency Contacts:    Krystle Esqueda, [phone],

## 2019-03-02 NOTE — TELEPHONE ENCOUNTER
Spoke with pt, she reports she is taking Metoprolol 25 mg in th am and in the evening  However, Dr Danni Russ is advising that she stops taking med  She wants to make sure that it is ok with you to D/C med  Pt says " you are the boss"

## 2019-03-02 NOTE — TELEPHONE ENCOUNTER
Hi Dr Gage Estrada,    I think this is one of your patients  I am not sure what dose Isaak Dacosta is taking  You may be more up to date  When I saw her August she was taking Metoprolol XL 50 mg daily  In November, I was asked to change her script to Metoprolol 25 mg twice daily, as this was more cost effective for her  Her latest medication list looks like she is taking Metoprolol XL 25 mg daily

## 2019-03-04 LAB
ASSAY DETAILS: NORMAL
CALR EXON 9 MUT ANL BLD/T: NOT DETECTED
CLINICAL INFO: NORMAL
CSF3R EXON 14/17 MUTATION: NOT DETECTED
EPO SERPL-ACNC: 8.9 MIU/ML (ref 2.6–18.5)
IRON SATN MFR SERPL: 27 % (CALC) (ref 11–50)
IRON SERPL-MCNC: 97 MCG/DL (ref 45–160)
JAK2 EXON 12 MUT ANL BLD/T: NOT DETECTED
JAK2 V617F MUTATION: NOT DETECTED
MICRODELETION SYND BLD/T FISH: NORMAL
MPL EXON 10 MUTATION: NOT DETECTED
SL AMB BLOCK/SAMPLE NUMBER: NORMAL
SPECIMEN SOURCE: NORMAL
TIBC SERPL-MCNC: 354 MCG/DL (CALC) (ref 250–450)

## 2019-03-05 ENCOUNTER — OFFICE VISIT (OUTPATIENT)
Dept: HEMATOLOGY ONCOLOGY | Facility: CLINIC | Age: 64
End: 2019-03-05
Payer: COMMERCIAL

## 2019-03-05 VITALS
HEART RATE: 69 BPM | WEIGHT: 182.4 LBS | BODY MASS INDEX: 33.57 KG/M2 | OXYGEN SATURATION: 98 % | DIASTOLIC BLOOD PRESSURE: 80 MMHG | RESPIRATION RATE: 16 BRPM | TEMPERATURE: 99.1 F | HEIGHT: 62 IN | SYSTOLIC BLOOD PRESSURE: 122 MMHG

## 2019-03-05 DIAGNOSIS — D58.2 ELEVATED HEMOGLOBIN (HCC): Primary | ICD-10-CM

## 2019-03-05 PROCEDURE — 99213 OFFICE O/P EST LOW 20 MIN: CPT | Performed by: PHYSICIAN ASSISTANT

## 2019-03-05 NOTE — TELEPHONE ENCOUNTER
I do not see in the cardiology note to stop taking the medication  Can you please ask the patient to confirm this 1 more time  I was concerned and wanted her to be evaluated by Cardiology as her blood pressures were elevated    Thank you

## 2019-03-06 ENCOUNTER — HOSPITAL ENCOUNTER (OUTPATIENT)
Dept: NON INVASIVE DIAGNOSTICS | Facility: CLINIC | Age: 64
Discharge: HOME | End: 2019-03-06
Payer: COMMERCIAL

## 2019-03-06 DIAGNOSIS — E78.5 DYSLIPIDEMIA: ICD-10-CM

## 2019-03-06 PROCEDURE — VASC: Performed by: SURGERY

## 2019-03-07 NOTE — TELEPHONE ENCOUNTER
Pt returned call, Pt reports that the instructions are in her after visit summary  It says- Pt instructions and medication changes- STOP taking Metoprolol  It is also in her MY CHART in red  She states she is still taking it, because she follows your instructions, since you know her history and are her PCP

## 2019-03-08 NOTE — TELEPHONE ENCOUNTER
Can you ask her to come in and schedule an appointment with me so we can talk about discontinuing or keeping her on the medication  I am concerned that her blood pressure may be further increased if we discontinue this medication    Thank you

## 2019-03-20 DIAGNOSIS — I10 ESSENTIAL HYPERTENSION: ICD-10-CM

## 2019-03-20 PROCEDURE — 4010F ACE/ARB THERAPY RXD/TAKEN: CPT | Performed by: FAMILY MEDICINE

## 2019-03-20 RX ORDER — LISINOPRIL 40 MG/1
40 TABLET ORAL DAILY
Qty: 90 TABLET | Refills: 1 | Status: SHIPPED | OUTPATIENT
Start: 2019-03-20 | End: 2019-09-19 | Stop reason: SDUPTHER

## 2019-03-26 ENCOUNTER — OFFICE VISIT (OUTPATIENT)
Dept: FAMILY MEDICINE CLINIC | Facility: CLINIC | Age: 64
End: 2019-03-26
Payer: COMMERCIAL

## 2019-03-26 VITALS
SYSTOLIC BLOOD PRESSURE: 140 MMHG | HEART RATE: 64 BPM | BODY MASS INDEX: 33.45 KG/M2 | RESPIRATION RATE: 16 BRPM | HEIGHT: 62 IN | DIASTOLIC BLOOD PRESSURE: 78 MMHG | WEIGHT: 181.8 LBS | TEMPERATURE: 98.1 F

## 2019-03-26 DIAGNOSIS — D58.2 ELEVATED HEMOGLOBIN (HCC): ICD-10-CM

## 2019-03-26 DIAGNOSIS — I10 HYPERTENSION, UNSPECIFIED TYPE: Primary | ICD-10-CM

## 2019-03-26 DIAGNOSIS — G47.33 OBSTRUCTIVE SLEEP APNEA: ICD-10-CM

## 2019-03-26 DIAGNOSIS — E11.9 TYPE 2 DIABETES MELLITUS WITHOUT COMPLICATION, WITHOUT LONG-TERM CURRENT USE OF INSULIN (HCC): ICD-10-CM

## 2019-03-26 DIAGNOSIS — E78.2 MIXED HYPERLIPIDEMIA: ICD-10-CM

## 2019-03-26 PROCEDURE — 1170F FXNL STATUS ASSESSED: CPT | Performed by: FAMILY MEDICINE

## 2019-03-26 PROCEDURE — 99214 OFFICE O/P EST MOD 30 MIN: CPT | Performed by: FAMILY MEDICINE

## 2019-03-26 PROCEDURE — 3008F BODY MASS INDEX DOCD: CPT | Performed by: FAMILY MEDICINE

## 2019-03-26 PROCEDURE — 1160F RVW MEDS BY RX/DR IN RCRD: CPT | Performed by: FAMILY MEDICINE

## 2019-03-26 PROCEDURE — 1125F AMNT PAIN NOTED PAIN PRSNT: CPT | Performed by: FAMILY MEDICINE

## 2019-03-26 PROCEDURE — G0438 PPPS, INITIAL VISIT: HCPCS | Performed by: FAMILY MEDICINE

## 2019-03-26 RX ORDER — METOPROLOL SUCCINATE 25 MG/1
50 TABLET, EXTENDED RELEASE ORAL DAILY
Qty: 90 TABLET | Refills: 0 | OUTPATIENT
Start: 2019-03-26

## 2019-03-26 NOTE — PROGRESS NOTES
Assessment and Plan:    Problem List Items Addressed This Visit        Endocrine    Type 2 diabetes mellitus ([address] )    Relevant Orders    Comprehensive metabolic panel    Hemoglobin A1C       Respiratory    Obstructive sleep apnea       Cardiovascular and Mediastinum    Hypertension - Primary    Relevant Orders    Comprehensive metabolic panel       Other    Hyperlipidemia    Elevated hemoglobin (HCC)    Relevant Orders    CBC and differential        Health Maintenance Due   Topic Date Due    BMI: Followup Plan  10/26/1973    MAMMOGRAM  2019         HPI:  Aneudy Figueroa is a 70 y o  female here for her IPPE(Welcome to Medicare Visit )    Patient Active Problem List   Diagnosis    Benign essential hypertension    Dyslipidemia    Type 2 diabetes mellitus ([facility] [address] )    Osteoarthritis of both knees    Obstructive sleep apnea    Obesity (BMI 30-39  9)    Hypovitaminosis D    Hemorrhoid    Hypertension    Hyperlipidemia    Routine health maintenance    Sinusitis    Lump of skin of right upper extremity    Lipoma    Elevated hemoglobin (HCC)    Chest wall tenderness    Nonspecific abnormal electrocardiogram (ECG) (EKG)     Past Medical History:   Diagnosis Date    Adhesive capsulitis of right shoulder     LAST ASSESSED 10/12/18     2 para 2     Hypertension     Nephrolithiasis     Kidney Stones    Prediabetes     LAST ASSESSED 17     Past Surgical History:   Procedure Laterality Date    ABDOMINAL HYSTERECTOMY      CATARACT EXTRACTION      CHOLECYSTECTOMY      GALLBLADDER SURGERY      TONSILLECTOMY       Family History   Problem Relation Age of Onset    Alcohol abuse Mother     Hypertension Mother     Stroke Mother     Breast cancer Sister      Social History     Tobacco Use   Smoking Status Never Smoker   Smokeless Tobacco Never Used     Social History     Substance and Sexual Activity   Alcohol Use No      Social History     Substance and Sexual Activity   Drug Use No Current Outpatient Medications   Medication Sig Dispense Refill    amLODIPine (NORVASC) 10 mg tablet TAKE 1 TABLET BY MOUTH DAILY 90 tablet 3    Cholecalciferol (VITAMIN D3) 1000 units CAPS Take by mouth      famotidine (PEPCID) 10 mg tablet Take 10 mg by mouth 2 (two) times a day      Flaxseed, Linseed, (FLAX SEED OIL) 1000 MG CAPS Take 1 capsule (1,000 mg total) by mouth daily 30 capsule 0    fluticasone (FLONASE) 50 mcg/act nasal spray 2 sprays into each nostril daily      Lactobacillus (PROBIOTIC ACIDOPHILUS) CAPS Take 1 tablet daily as supplement   lisinopril (ZESTRIL) 40 mg tablet TAKE 1 TABLET (40 MG TOTAL) BY MOUTH DAILY 90 tablet 1    loratadine (CLARITIN) 10 mg tablet Take 1 tablet by mouth daily as needed      metoprolol succinate (TOPROL-XL) 25 mg 24 hr tablet Take 25 mg by mouth daily      repaglinide (PRANDIN) 0 5 mg tablet Take 1 tablet (0 5 mg total) by mouth daily 180 tablet 0     No current facility-administered medications for this visit  Allergies   Allergen Reactions    Sulfa Antibiotics Myalgia     Other reaction(s): severe joint pain  Action Taken: joint pain and stiffness;     Sulfamethopyrazine      Joint stiffness    Sulfamethoxazole-Trimethoprim Other (See Comments)     Joint pain     Immunization History   Administered Date(s) Administered    INFLUENZA 10/18/2018    Influenza Quadrivalent Preservative Free 3 years and older IM 10/04/2017    Influenza TIV (IM) 09/01/2014, 01/05/2016, 10/26/2016    Pneumococcal Conjugate 13-Valent 12/18/2018    Pneumococcal Polysaccharide PPV23 07/12/2016    Tdap 07/17/2018    Zoster 01/01/2014, 08/30/2016       Patient Care Team:  Brendan Harper MD as PCP - General  MD Donovan Tierney MD    Medicare Screening Tests and Risk Assessments:  Saul Hernandez is here for her Welcome to Medicare visit  Health Risk Assessment:  Patient rates overall health as good   Patient feels that their physical health rating is Linnette Roly was rated as Same  Hearing was rated as Same  Patient feels that their emotional and mental health rating is Same  Pain experienced by patient in the last 7 days has been None  Emotional/Mental Health:  Patient has been feeling nervous/anxious  PHQ-9 Depression Screening:    Frequency of the following problems over the past two weeks:      1  Little interest or pleasure in doing things: 1 - several days      2  Feeling down, depressed, or hopeless: 1 - several days      3  Trouble falling or staying asleep, or sleeping too much: 0 - not at all      4  Feeling tired or having little energy: 0 - not at all      5  Poor appetite or overeatin - not at all      6  Feeling bad about yourself - or that you are a failure or have let yourself or your family down: 0 - not at all      7  Trouble concentrating on things, such as reading the newspaper or watching television: 0 - not at all      8  Moving or speaking so slowly that other people could have noticed  Or the opposite - being so fidgety or restless that you have been moving around a lot more than usual: 0 - not at all      9  Thoughts that you would be better off dead, or of hurting yourself in some way: 0 - not at all  PHQ-2 Score: 2  PHQ-9 Score: 2    Broken Bones/Falls: Fall Risk Assessment:    In the past year, patient has experienced: No history of falling in past year          Bladder/Bowel:  Patient has leaked urine accidently in the last six months  Patient reports no loss of bowel control  Immunizations:  Patient has had a flu vaccination within the last year  Patient has received a pneumonia shot  Patient has received a shingles shot  Patient has received tetanus/diphtheria shot  Date of tetanus/diphtheria shot: 2018    Home Safety:  Patient does not have trouble with stairs inside or outside of their home     Patient currently reports that there are no safety hazards present in home, working smoke alarms, working carbon monoxide detectors  Preventative Screenings:   Breast cancer screening performed, 5/23/2018  colon cancer screen completed, 9/15/2015  cholesterol screen completed, 12/11/2018  glaucoma eye exam completed, (Additional Comments: Pt reports having an eye exam )    Nutrition:  Current diet: Limited junk food, No Added Salt and Regular with servings of the following:    Medications:  Patient is currently taking over-the-counter supplements  List of OTC medications includes: See updated med list  Patient is able to manage medications  Lifestyle Choices:  Patient reports no tobacco use  Patient has not smoked or used tobacco in the past   Patient reports no alcohol use  Patient drives a vehicle  Patient wears seat belt  Current level of exercise of physical activity described by patient as: Walk 10,000+ steps per day,  Gym 2x/week  Activities of Daily Living:  Can get out of bed by his or her self, able to dress self, able to make own meals, able to do own shopping, able to bathe self, can do own laundry/housekeeping, can manage own money, pay bills and track expenses    Previous Hospitalizations:  No hospitalization or ED visit in past 12 months        Advanced Directives:  Patient has decided on a power of   Patient has spoken to designated power of   Patient has completed advanced directive          Preventative Screening/Counseling:      Cardiovascular:      General: Screening Current      Counseling: Healthy Diet, Healthy Weight and Improve Exercise Tolerance          Diabetes:      General: Screening Current      Counseling: Healthy Diet, Healthy Weight and Improve Physical Activity          Colorectal Cancer:      General: Screening Current      Counseling: high fiber diet          Breast Cancer:      General: Screening Current          Cervical Cancer:      General: Screening Not Indicated          Osteoporosis:      General: Screening Current      Counseling: Calcium and Vitamin D Intake and Regular Weightbearing Exercise          AAA:      General: Screening Not Indicated          Glaucoma:      General: Screening Current          HIV:      General: Screening Not Indicated          Hepatitis C:      Counseling: has received general HCV counseling        Advanced Directives:   has durable POA for healthcare, patient has an advanced directive  Immunizations:      Influenza: Influenza UTD This Year      Shingrix: Risks & Benefits Discussed      Hepatitis B (Low risk patients): Series Not Indicated  Additional Comments: Up-to-date with Prevnar 13    Other Preventative Counseling (Non-Medicare):   Fall Prevention, Increase physical activity and Nutrition Counseling

## 2019-03-26 NOTE — PROGRESS NOTES
FAMILY PRACTICE OFFICE VISIT       NAME: Beckie Rothman  AGE: 70 y o  SEX: female       : 1955        MRN: [id number]    DATE: 3/27/2019  TIME: 9:58 PM    Assessment and Plan     Problem List Items Addressed This Visit        Endocrine    Type 2 diabetes mellitus ([address] )    Relevant Orders    Comprehensive metabolic panel    Hemoglobin A1C       Respiratory    Obstructive sleep apnea       Cardiovascular and Mediastinum    Hypertension - Primary    Relevant Orders    Comprehensive metabolic panel       Other    Hyperlipidemia    Elevated hemoglobin (HCC)    Relevant Orders    CBC and differential        Hypertension:  BP is elevated today  Upon recheck, decreased  I would like patient to monitor BP at home    Continue with lifestyle modifications  Continue with amlodipine, metoprolol, lisinopril  Hyperlipidemia:  Unable to tolerate statin  Continue with lifestyle modifications  Will continue to monitor  Patient discontinued red yeast rice as this was increasing her hemoglobin  Diabetes:  Most recent A1c noted to be 6 2  Continue with prandin and lifestyle modifications  Up-to-date with ophthalmology exam and foot exams  Obstructive sleep apnea:  Unable to tolerate CPA P  Patient does feel rested in the mornings  Elevated hemoglobin:  Patient was evaluated by Hematology and it was felt that her elevated hemoglobin could be due to red yeast rice intake  She has discontinued taking this  Will monitor  Routine health maintenance:  Saw dr Diego murray with colonoscopy, with follow-up recommended at the end of   Up-to-date with mammogram     Up-to-date with DEXA scan  Up-to-date with flu vaccine, Prevnar 13, Tdap  I have discussed the new shingles vaccine with the patient  There are no Patient Instructions on file for this visit          Chief Complaint     Chief Complaint   Patient presents with   [facility] Wellness Visit     Welcome    Follow-up     Pt is here for 2 mos f/u History of Present Illness     HPI   68-year-old female here for routine follow-up of chronic conditions  Patient states she was evaluated by Hematology and was told that her Elevated hemoglobin could be due to red yeast rice  Since then, she has stopped red yeast rice  Has been exercising 2 days per week at the  and volunteering  at the hospital  Doing 10,000 steps daily   Saw dr Hira murray with colonoscopy, with follow-up recommended at the end of    saw dr Chavez Martin for skin check in January   f/u as needed  took meds this am   taking metoprolol 25 mg bid   Review of Systems   Review of Systems   Constitutional: Negative for unexpected weight change  HENT: Negative  Eyes: Negative for visual disturbance  Respiratory: Negative for shortness of breath  Cardiovascular: Negative  Gastrointestinal: Negative for abdominal pain and constipation  Genitourinary: Negative for dysuria  Neurological: Negative for dizziness and light-headedness  Psychiatric/Behavioral: Negative for dysphoric mood  Active Problem List     Patient Active Problem List   Diagnosis    Benign essential hypertension    Dyslipidemia    Type 2 diabetes mellitus ([address] )    Osteoarthritis of both knees    Obstructive sleep apnea    Obesity (BMI 30-39  9)    Hypovitaminosis D    Hemorrhoid    Hypertension    Hyperlipidemia    Routine health maintenance    Sinusitis    Lump of skin of right upper extremity    Lipoma    Elevated hemoglobin (HCC)    Chest wall tenderness    Nonspecific abnormal electrocardiogram (ECG) (EKG)       Past Medical History:  Past Medical History:   Diagnosis Date    Adhesive capsulitis of right shoulder     LAST ASSESSED 10/12/18     2 para 2     Hypertension     Nephrolithiasis     Kidney Stones    Prediabetes     LAST ASSESSED 17       Past Surgical History:  Past Surgical History:   Procedure Laterality Date    ABDOMINAL HYSTERECTOMY      CATARACT EXTRACTION      CHOLECYSTECTOMY      GALLBLADDER SURGERY      TONSILLECTOMY         Family History:  Family History   Problem Relation Age of Onset    Alcohol abuse Mother     Hypertension Mother     Stroke Mother     Breast cancer Sister        Social History:  Social History     Socioeconomic History    Marital status: /Civil Union     Spouse name: Not on file    Number of children: 2    Years of education: Not on file    Highest education level: Not on file   Occupational History    Occupation: RETIRED   Social Needs    Financial resource strain: Not on file    Food insecurity:     Worry: Not on file     Inability: Not on file   [place] needs:     Medical: Not on file     Non-medical: Not on file   Tobacco Use    Smoking status: Never Smoker    Smokeless tobacco: Never Used   Substance and Sexual Activity    Alcohol use: No    Drug use: No    Sexual activity: Not Currently   Lifestyle    Physical activity:     Days per week: Not on file     Minutes per session: Not on file    Stress: Not on file   Relationships    Social connections:     Talks on phone: Not on file     Gets together: Not on file     Attends Holiness service: Not on file     Active member of club or organization: Not on file     Attends meetings of clubs or organizations: Not on file     Relationship status: Not on file    Intimate partner violence:     Fear of current or ex partner: Not on file     Emotionally abused: Not on file     Physically abused: Not on file     Forced sexual activity: Not on file   Other Topics Concern    Not on file   Social History Narrative    CAFFEINE USE     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     I have reviewed the patient's medical history in detail; there are no changes to the history as noted in the electronic medical record      Objective     Vitals:    03/26/19 0928   BP: 140/78   Pulse:    Resp:    Temp:      Wt Readings from Last 3 Encounters:   03/26/19 82 5 kg (181 lb 12 8 oz)   03/05/19 82 7 kg (182 lb 6 4 oz)   02/25/19 83 3 kg (183 lb 11 2 oz)     Vitals:    03/26/19 0825 03/26/19 0928   BP: 170/90 140/78   Pulse: 64    Resp: 16    Temp: 98 1 °F (36 7 °C)    TempSrc: Tympanic    Weight: 82 5 kg (181 lb 12 8 oz)    Height: 5' 1 5" (1 562 m)        Physical Exam   Constitutional: She is oriented to person, place, and time  She appears well-developed and well-nourished  HENT:   Head: Normocephalic and atraumatic  Mouth/Throat: Oropharynx is clear and moist    tms intact and clear   Eyes: Pupils are equal, round, and reactive to light  Conjunctivae and EOM are normal    Neck: Normal range of motion  Neck supple  No thyromegaly present  Cardiovascular: Normal rate, regular rhythm and normal heart sounds  Pulmonary/Chest: Effort normal and breath sounds normal    Abdominal: Soft  Bowel sounds are normal  She exhibits no distension  There is no tenderness  Musculoskeletal: Normal range of motion  She exhibits no edema  Lymphadenopathy:     She has no cervical adenopathy  Neurological: She is alert and oriented to person, place, and time  Psychiatric: She has a normal mood and affect  Nursing note and vitals reviewed        Pertinent Laboratory/Diagnostic Studies:  Lab Results   Component Value Date    GLUCOSE 101 02/12/2014    BUN 14 12/22/2018    CREATININE 1 10 (H) 01/11/2018    CALCIUM 9 2 12/22/2018     01/11/2018    K 3 8 12/22/2018    CO2 30 12/22/2018     12/22/2018     Lab Results   Component Value Date    ALT 15 12/22/2018    AST 13 12/22/2018    ALKPHOS 76 12/22/2018    BILITOT 0 5 01/11/2018       Lab Results   Component Value Date    WBC 6 1 01/16/2019    HGB 15 7 (H) 01/16/2019    HCT 46 3 (H) 01/16/2019    MCV 89 2 01/16/2019     01/16/2019       Lab Results   Component Value Date    TSH 1 26 12/11/2018       Lab Results   Component Value Date    CHOL 193 01/11/2018     Lab Results   Component Value Date    TRIG 124 12/11/2018     Lab Results   Component Value Date    HDL 61 12/11/2018     No results found for: [facility]  Lab Results   Component Value Date    HGBA1C 6 2 (H) 12/22/2018       Results for orders placed or performed in visit on 02/27/19   TIBC   Result Value Ref Range    Iron, Serum 97 45 - 160 mcg/dL    Total Iron Binding Capacity (TIBC) 354 250 - 450 mcg/dL (calc)    Iron Saturation 27 11 - 50 % (calc)   JAK2 V617F CASCADING REFLEX TO CALR, JAK2 EXON 12, MPL, AND CSF3R   Result Value Ref Range    SL AMB CLINICAL INFORMATION Not Given     SPECIMEN SOURCE see note     Block/Sample Number Not given     JAK2 V617F MUTATION Not Detected Not Detected    CALR MUTATION (EXON 9) Not Detected Not Detected    JAK2 Exon 12 Mutation Not Detected Not Detected    MPL EXON 10 MUTATION Not Detected Not Detected    CSF3R EXON 14/17 MUTATION Not Detected Not Detected    Interpretation see note     ASSAY DETAILS see note    Erythropoietin   Result Value Ref Range    Erythropoietin 8 9 2 6 - 18 5 mIU/mL       Orders Placed This Encounter   Procedures    Comprehensive metabolic panel    CBC and differential    Hemoglobin A1C       ALLERGIES:  Allergies   Allergen Reactions    Sulfa Antibiotics Myalgia     Other reaction(s): severe joint pain  Action Taken: joint pain and stiffness;     Sulfamethopyrazine      Joint stiffness    Sulfamethoxazole-Trimethoprim Other (See Comments)     Joint pain       Current Medications     Current Outpatient Medications   Medication Sig Dispense Refill    amLODIPine (NORVASC) 10 mg tablet TAKE 1 TABLET BY MOUTH DAILY 90 tablet 3    Cholecalciferol (VITAMIN D3) 1000 units CAPS Take by mouth      famotidine (PEPCID) 10 mg tablet Take 10 mg by mouth 2 (two) times a day      Flaxseed, Linseed, (FLAX SEED OIL) 1000 MG CAPS Take 1 capsule (1,000 mg total) by mouth daily 30 capsule 0    fluticasone (FLONASE) 50 mcg/act nasal spray 2 sprays into each nostril daily      Lactobacillus (PROBIOTIC ACIDOPHILUS) CAPS Take 1 tablet daily as supplement   lisinopril (ZESTRIL) 40 mg tablet TAKE 1 TABLET (40 MG TOTAL) BY MOUTH DAILY 90 tablet 1    loratadine (CLARITIN) 10 mg tablet Take 1 tablet by mouth daily as needed      metoprolol succinate (TOPROL-XL) 25 mg 24 hr tablet Take 25 mg by mouth daily      repaglinide (PRANDIN) 0 5 mg tablet Take 1 tablet (0 5 mg total) by mouth daily 180 tablet 0     No current facility-administered medications for this visit            Health Maintenance     Health Maintenance   Topic Date Due    BMI: Followup Plan  10/26/1971    MAMMOGRAM  05/23/2019    Hepatitis C Screening  03/30/2019 (Originally 1955)    HEPATITIS B VACCINES (1 of 3 - Risk 3-dose series) 04/25/2019 (Originally 10/26/1972)    HEMOGLOBIN A1C  06/22/2019    DM Eye Exam  07/18/2019    Diabetic Foot Exam  12/21/2019    Medicare Annual Wellness Visit (AWV)  12/28/2019    Fall Risk  03/26/2020    Depression Screening PHQ  03/26/2020    Urinary Incontinence Screening  03/26/2020    BMI: Adult  03/26/2020    Pneumococcal PPSV23/PCV13 65+ Years / High and Highest Risk (2 of 2 - PPSV23) 07/12/2021    CRC Screening: Colonoscopy  09/15/2025    DTaP,Tdap,and Td Vaccines (2 - Td) 07/17/2028    INFLUENZA VACCINE  Completed     Immunization History   Administered Date(s) Administered    INFLUENZA 10/18/2018    Influenza Quadrivalent Preservative Free 3 years and older IM 10/04/2017    Influenza TIV (IM) 09/01/2014, 01/05/2016, 10/26/2016    Pneumococcal Conjugate 13-Valent 12/18/2018    Pneumococcal Polysaccharide PPV23 07/12/2016    Tdap 07/17/2018    Zoster 01/01/2014, 08/30/2016       Cornelius Ho MD

## 2019-04-11 DIAGNOSIS — E11.9 TYPE 2 DIABETES MELLITUS WITHOUT COMPLICATION, WITHOUT LONG-TERM CURRENT USE OF INSULIN (HCC): ICD-10-CM

## 2019-04-11 RX ORDER — REPAGLINIDE 0.5 MG/1
0.5 TABLET ORAL DAILY
Qty: 90 TABLET | Refills: 3 | Status: SHIPPED | OUTPATIENT
Start: 2019-04-11 | End: 2020-05-18

## 2019-06-17 ENCOUNTER — TRANSITIONAL CARE MANAGEMENT (OUTPATIENT)
Dept: FAMILY MEDICINE CLINIC | Facility: CLINIC | Age: 64
End: 2019-06-17

## 2019-06-17 ENCOUNTER — OFFICE VISIT (OUTPATIENT)
Dept: FAMILY MEDICINE CLINIC | Facility: CLINIC | Age: 64
End: 2019-06-17
Payer: COMMERCIAL

## 2019-06-17 VITALS
HEART RATE: 73 BPM | SYSTOLIC BLOOD PRESSURE: 138 MMHG | WEIGHT: 178.6 LBS | DIASTOLIC BLOOD PRESSURE: 72 MMHG | TEMPERATURE: 98.1 F | BODY MASS INDEX: 32.87 KG/M2 | RESPIRATION RATE: 16 BRPM | OXYGEN SATURATION: 97 % | HEIGHT: 62 IN
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
## 2019-06-17 DIAGNOSIS — N20.0 KIDNEY STONES: ICD-10-CM

## 2019-06-17 DIAGNOSIS — I10 HYPERTENSION, UNSPECIFIED TYPE: ICD-10-CM

## 2019-06-17 DIAGNOSIS — E11.00 TYPE 2 DIABETES MELLITUS WITH HYPEROSMOLARITY WITHOUT COMA, WITHOUT LONG-TERM CURRENT USE OF INSULIN (HCC): ICD-10-CM

## 2019-06-17 DIAGNOSIS — IMO0001 TRANSITION OF CARE PERFORMED WITH SHARING OF CLINICAL SUMMARY: Primary | ICD-10-CM

## 2019-06-17 LAB — SL AMB POCT HEMOGLOBIN AIC: 6.1 (ref ?–6.5)

## 2019-06-17 PROCEDURE — 83036 HEMOGLOBIN GLYCOSYLATED A1C: CPT | Performed by: FAMILY MEDICINE

## 2019-06-17 PROCEDURE — 99495 TRANSJ CARE MGMT MOD F2F 14D: CPT | Performed by: FAMILY MEDICINE

## 2019-06-17 RX ORDER — OXYCODONE HYDROCHLORIDE 5 MG/1
TABLET ORAL
Refills: 0 | COMMUNITY
Start: 2019-06-13 | End: 2019-06-28 | Stop reason: SDUPTHER

## 2019-06-18 ENCOUNTER — TELEPHONE (OUTPATIENT)
Dept: FAMILY MEDICINE CLINIC | Facility: CLINIC | Age: 64
End: 2019-06-18

## 2019-06-18 DIAGNOSIS — N20.0 KIDNEY STONES: Primary | ICD-10-CM

## 2019-06-19 ENCOUNTER — HOSPITAL ENCOUNTER (OUTPATIENT)
Dept: RADIOLOGY | Facility: HOSPITAL | Age: 64
Discharge: HOME | End: 2019-06-19
Attending: UROLOGY
Payer: COMMERCIAL

## 2019-06-19 ENCOUNTER — OFFICE VISIT (OUTPATIENT)
Dept: UROLOGY | Facility: CLINIC | Age: 64
End: 2019-06-19
Payer: COMMERCIAL

## 2019-06-19 VITALS
BODY MASS INDEX: 32.39 KG/M2 | HEART RATE: 72 BPM | DIASTOLIC BLOOD PRESSURE: 88 MMHG | SYSTOLIC BLOOD PRESSURE: 126 MMHG | HEIGHT: 62 IN | WEIGHT: 176 LBS

## 2019-06-19 DIAGNOSIS — N20.0 KIDNEY STONES: ICD-10-CM

## 2019-06-19 DIAGNOSIS — N20.0 KIDNEY STONES: Primary | ICD-10-CM

## 2019-06-19 LAB
SL AMB  POCT GLUCOSE, UA: ABNORMAL
SL AMB LEUKOCYTE ESTERASE,UA: 2
SL AMB POCT BILIRUBIN,UA: ABNORMAL
SL AMB POCT BLOOD,UA: 2
SL AMB POCT CLARITY,UA: CLEAR
SL AMB POCT COLOR,UA: YELLOW
SL AMB POCT KETONES,UA: ABNORMAL
SL AMB POCT NITRITE,UA: ABNORMAL
SL AMB POCT PH,UA: 6
SL AMB POCT SPECIFIC GRAVITY,UA: 1.02
SL AMB POCT URINE PROTEIN: ABNORMAL
SL AMB POCT UROBILINOGEN: ABNORMAL

## 2019-06-19 PROCEDURE — 81002 URINALYSIS NONAUTO W/O SCOPE: CPT | Performed by: UROLOGY

## 2019-06-19 PROCEDURE — 74018 RADEX ABDOMEN 1 VIEW: CPT

## 2019-06-19 PROCEDURE — 99204 OFFICE O/P NEW MOD 45 MIN: CPT | Performed by: UROLOGY

## 2019-06-20 ENCOUNTER — TELEPHONE (OUTPATIENT)
Dept: FAMILY MEDICINE CLINIC | Facility: CLINIC | Age: 64
End: 2019-06-20

## 2019-06-20 ENCOUNTER — OFFICE VISIT (OUTPATIENT)
Dept: LAB | Facility: CLINIC | Age: 64
End: 2019-06-20
Payer: COMMERCIAL

## 2019-06-20 ENCOUNTER — TELEPHONE (OUTPATIENT)
Dept: UROLOGY | Facility: AMBULATORY SURGERY CENTER | Age: 64
End: 2019-06-20

## 2019-06-20 DIAGNOSIS — N20.0 KIDNEY STONES: ICD-10-CM

## 2019-06-20 LAB
ATRIAL RATE: 65 BPM
P AXIS: 25 DEGREES
PR INTERVAL: 150 MS
QRS AXIS: 62 DEGREES
QRSD INTERVAL: 88 MS
QT INTERVAL: 400 MS
QTC INTERVAL: 416 MS
T WAVE AXIS: 14 DEGREES
VENTRICULAR RATE: 65 BPM

## 2019-06-20 PROCEDURE — 93005 ELECTROCARDIOGRAM TRACING: CPT

## 2019-06-20 PROCEDURE — 93010 ELECTROCARDIOGRAM REPORT: CPT | Performed by: INTERNAL MEDICINE

## 2019-06-23 ENCOUNTER — ANESTHESIA EVENT (OUTPATIENT)
Dept: PERIOP | Facility: AMBULARY SURGERY CENTER | Age: 64
End: 2019-06-23
Payer: COMMERCIAL

## 2019-06-26 ENCOUNTER — TELEPHONE (OUTPATIENT)
Dept: UROLOGY | Facility: MEDICAL CENTER | Age: 64
End: 2019-06-26

## 2019-06-27 NOTE — PRE-PROCEDURE INSTRUCTIONS
Pre-Surgery Instructions:   Medication Instructions    amLODIPine (NORVASC) 10 mg tablet Instructed patient per Anesthesia Guidelines   Cholecalciferol (VITAMIN D3) 1000 units CAPS Instructed patient per Anesthesia Guidelines   famotidine (PEPCID) 10 mg tablet Instructed patient per Anesthesia Guidelines   Flaxseed, Linseed, (FLAX SEED OIL) 1000 MG CAPS Instructed patient per Anesthesia Guidelines   fluticasone (FLONASE) 50 mcg/act nasal spray Instructed patient per Anesthesia Guidelines   Lactobacillus (PROBIOTIC ACIDOPHILUS) CAPS Instructed patient per Anesthesia Guidelines   lisinopril (ZESTRIL) 40 mg tablet Instructed patient per Anesthesia Guidelines   loratadine (CLARITIN) 10 mg tablet Instructed patient per Anesthesia Guidelines   metoprolol succinate (TOPROL-XL) 25 mg 24 hr tablet Instructed patient per Anesthesia Guidelines   oxyCODONE (ROXICODONE) 5 mg immediate release tablet Instructed patient per Anesthesia Guidelines   repaglinide (PRANDIN) 0 5 mg tablet Instructed patient per Anesthesia Guidelines      Pre op and showering instructions using an antibacterial soap reviewed

## 2019-06-28 ENCOUNTER — OFFICE VISIT (OUTPATIENT)
Dept: FAMILY MEDICINE CLINIC | Facility: CLINIC | Age: 64
End: 2019-06-28
Payer: COMMERCIAL

## 2019-06-28 VITALS
TEMPERATURE: 99 F | DIASTOLIC BLOOD PRESSURE: 80 MMHG | SYSTOLIC BLOOD PRESSURE: 150 MMHG | HEART RATE: 74 BPM | OXYGEN SATURATION: 97 % | BODY MASS INDEX: 32.57 KG/M2 | RESPIRATION RATE: 16 BRPM | HEIGHT: 62 IN | WEIGHT: 177 LBS

## 2019-06-28 DIAGNOSIS — M25.561 ACUTE PAIN OF RIGHT KNEE: ICD-10-CM
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
## 2019-06-28 PROCEDURE — 99213 OFFICE O/P EST LOW 20 MIN: CPT | Performed by: NURSE PRACTITIONER

## 2019-06-28 RX ORDER — OXYCODONE HYDROCHLORIDE 5 MG/1
5 TABLET ORAL EVERY 6 HOURS PRN
Qty: 20 TABLET | Refills: 0 | Status: SHIPPED | OUTPATIENT
Start: 2019-06-28 | End: 2019-07-25

## 2019-07-02 ENCOUNTER — HOSPITAL ENCOUNTER (OUTPATIENT)
Facility: AMBULARY SURGERY CENTER | Age: 64
Setting detail: OUTPATIENT SURGERY
Discharge: HOME | End: 2019-07-02
Attending: UROLOGY | Admitting: UROLOGY
Payer: COMMERCIAL

## 2019-07-02 ENCOUNTER — ANESTHESIA (OUTPATIENT)
Dept: PERIOP | Facility: AMBULARY SURGERY CENTER | Age: 64
End: 2019-07-02
Payer: COMMERCIAL

## 2019-07-02 ENCOUNTER — APPOINTMENT (OUTPATIENT)
Dept: RADIOLOGY | Facility: AMBULARY SURGERY CENTER | Age: 64
End: 2019-07-02
Payer: COMMERCIAL

## 2019-07-02 VITALS
SYSTOLIC BLOOD PRESSURE: 167 MMHG | HEART RATE: 69 BPM | BODY MASS INDEX: 31.47 KG/M2 | HEIGHT: 62 IN | TEMPERATURE: 97.8 F | DIASTOLIC BLOOD PRESSURE: 77 MMHG | WEIGHT: 171 LBS | OXYGEN SATURATION: 98 % | RESPIRATION RATE: 16 BRPM

## 2019-07-02 DIAGNOSIS — N20.0 KIDNEY STONES: Primary | ICD-10-CM

## 2019-07-02 PROCEDURE — C1769 GUIDE WIRE: HCPCS | Performed by: UROLOGY

## 2019-07-02 PROCEDURE — 52356 CYSTO/URETERO W/LITHOTRIPSY: CPT | Performed by: UROLOGY

## 2019-07-02 PROCEDURE — C2617 STENT, NON-COR, TEM W/O DEL: HCPCS | Performed by: UROLOGY

## 2019-07-02 PROCEDURE — 74018 RADEX ABDOMEN 1 VIEW: CPT

## 2019-07-02 DEVICE — STENT URETERAL 6 FR 26CM INLAY OPTIMA: Type: IMPLANTABLE DEVICE | Site: URETER | Status: FUNCTIONAL

## 2019-07-02 RX ORDER — ONDANSETRON 2 MG/ML
4 INJECTION INTRAMUSCULAR; INTRAVENOUS ONCE AS NEEDED
Status: DISCONTINUED | OUTPATIENT
Start: 2019-07-02 | End: 2019-07-02 | Stop reason: HOSPADM

## 2019-07-02 RX ORDER — HYDROCODONE BITARTRATE AND ACETAMINOPHEN 5; 325 MG/1; MG/1
1 TABLET ORAL EVERY 6 HOURS PRN
Qty: 5 TABLET | Refills: 0 | Status: SHIPPED | OUTPATIENT
Start: 2019-07-02 | End: 2019-07-12

## 2019-07-02 RX ORDER — SODIUM CHLORIDE, SODIUM LACTATE, POTASSIUM CHLORIDE, CALCIUM CHLORIDE 600; 310; 30; 20 MG/100ML; MG/100ML; MG/100ML; MG/100ML
125 INJECTION, SOLUTION INTRAVENOUS CONTINUOUS
Status: DISCONTINUED | OUTPATIENT
Start: 2019-07-02 | End: 2019-07-02 | Stop reason: HOSPADM

## 2019-07-02 RX ORDER — HYDROCODONE BITARTRATE AND ACETAMINOPHEN 5; 325 MG/1; MG/1
1 TABLET ORAL EVERY 4 HOURS PRN
Status: DISCONTINUED | OUTPATIENT
Start: 2019-07-02 | End: 2019-07-02 | Stop reason: HOSPADM

## 2019-07-02 RX ORDER — HYDROMORPHONE HCL/PF 1 MG/ML
0.2 SYRINGE (ML) INJECTION
Status: DISCONTINUED | OUTPATIENT
Start: 2019-07-02 | End: 2019-07-02 | Stop reason: HOSPADM

## 2019-07-02 RX ORDER — ACETAMINOPHEN 500 MG
500 TABLET ORAL 3 TIMES DAILY PRN
COMMUNITY
End: 2020-01-07 | Stop reason: ALTCHOICE

## 2019-07-02 RX ORDER — LIDOCAINE HYDROCHLORIDE 10 MG/ML
INJECTION, SOLUTION INFILTRATION; PERINEURAL AS NEEDED
Status: DISCONTINUED | OUTPATIENT
Start: 2019-07-02 | End: 2019-07-02 | Stop reason: SURG

## 2019-07-02 RX ORDER — MAGNESIUM HYDROXIDE 1200 MG/15ML
LIQUID ORAL AS NEEDED
Status: DISCONTINUED | OUTPATIENT
Start: 2019-07-02 | End: 2019-07-02 | Stop reason: HOSPADM

## 2019-07-02 RX ORDER — ONDANSETRON 2 MG/ML
INJECTION INTRAMUSCULAR; INTRAVENOUS AS NEEDED
Status: DISCONTINUED | OUTPATIENT
Start: 2019-07-02 | End: 2019-07-02 | Stop reason: SURG

## 2019-07-02 RX ORDER — ONDANSETRON 2 MG/ML
4 INJECTION INTRAMUSCULAR; INTRAVENOUS EVERY 4 HOURS PRN
Status: DISCONTINUED | OUTPATIENT
Start: 2019-07-02 | End: 2019-07-02 | Stop reason: HOSPADM

## 2019-07-02 RX ORDER — MIDAZOLAM HYDROCHLORIDE 1 MG/ML
INJECTION INTRAMUSCULAR; INTRAVENOUS AS NEEDED
Status: DISCONTINUED | OUTPATIENT
Start: 2019-07-02 | End: 2019-07-02 | Stop reason: SURG

## 2019-07-02 RX ORDER — ACETAMINOPHEN 325 MG/1
650 TABLET ORAL EVERY 4 HOURS PRN
Status: DISCONTINUED | OUTPATIENT
Start: 2019-07-02 | End: 2019-07-02 | Stop reason: HOSPADM

## 2019-07-02 RX ORDER — CEFAZOLIN SODIUM 2 G/50ML
2000 SOLUTION INTRAVENOUS ONCE
Status: COMPLETED | OUTPATIENT
Start: 2019-07-02 | End: 2019-07-02

## 2019-07-02 RX ORDER — CIPROFLOXACIN 500 MG/1
500 TABLET, FILM COATED ORAL 2 TIMES DAILY
Qty: 6 TABLET | Refills: 0 | Status: SHIPPED | OUTPATIENT
Start: 2019-07-02 | End: 2019-07-05

## 2019-07-02 RX ORDER — FENTANYL CITRATE 50 UG/ML
INJECTION, SOLUTION INTRAMUSCULAR; INTRAVENOUS AS NEEDED
Status: DISCONTINUED | OUTPATIENT
Start: 2019-07-02 | End: 2019-07-02 | Stop reason: SURG

## 2019-07-02 RX ORDER — FENTANYL CITRATE/PF 50 MCG/ML
25 SYRINGE (ML) INJECTION
Status: DISCONTINUED | OUTPATIENT
Start: 2019-07-02 | End: 2019-07-02 | Stop reason: HOSPADM

## 2019-07-02 RX ORDER — KETOROLAC TROMETHAMINE 30 MG/ML
INJECTION, SOLUTION INTRAMUSCULAR; INTRAVENOUS AS NEEDED
Status: DISCONTINUED | OUTPATIENT
Start: 2019-07-02 | End: 2019-07-02 | Stop reason: SURG

## 2019-07-02 RX ORDER — DEXAMETHASONE SODIUM PHOSPHATE 4 MG/ML
INJECTION, SOLUTION INTRA-ARTICULAR; INTRALESIONAL; INTRAMUSCULAR; INTRAVENOUS; SOFT TISSUE AS NEEDED
Status: DISCONTINUED | OUTPATIENT
Start: 2019-07-02 | End: 2019-07-02 | Stop reason: SURG

## 2019-07-02 RX ORDER — METOCLOPRAMIDE HYDROCHLORIDE 5 MG/ML
10 INJECTION INTRAMUSCULAR; INTRAVENOUS ONCE AS NEEDED
Status: DISCONTINUED | OUTPATIENT
Start: 2019-07-02 | End: 2019-07-02 | Stop reason: HOSPADM

## 2019-07-02 RX ORDER — PROPOFOL 10 MG/ML
INJECTION, EMULSION INTRAVENOUS AS NEEDED
Status: DISCONTINUED | OUTPATIENT
Start: 2019-07-02 | End: 2019-07-02 | Stop reason: SURG

## 2019-07-02 RX ADMIN — ACETAMINOPHEN 650 MG: 325 TABLET, FILM COATED ORAL at 13:37

## 2019-07-02 RX ADMIN — DEXAMETHASONE SODIUM PHOSPHATE 4 MG: 4 INJECTION, SOLUTION INTRAMUSCULAR; INTRAVENOUS at 12:28

## 2019-07-02 RX ADMIN — CEFAZOLIN SODIUM 2000 MG: 2 SOLUTION INTRAVENOUS at 12:27

## 2019-07-02 RX ADMIN — FENTANYL CITRATE 25 MCG: 50 INJECTION, SOLUTION INTRAMUSCULAR; INTRAVENOUS at 12:34

## 2019-07-02 RX ADMIN — LIDOCAINE HYDROCHLORIDE ANHYDROUS 50 MG: 10 INJECTION, SOLUTION INFILTRATION at 12:28

## 2019-07-02 RX ADMIN — PROPOFOL 150 MG: 10 INJECTION, EMULSION INTRAVENOUS at 12:28

## 2019-07-02 RX ADMIN — FENTANYL CITRATE 25 MCG: 50 INJECTION, SOLUTION INTRAMUSCULAR; INTRAVENOUS at 12:28

## 2019-07-02 RX ADMIN — ONDANSETRON 4 MG: 2 INJECTION INTRAMUSCULAR; INTRAVENOUS at 12:24

## 2019-07-02 RX ADMIN — HYDROCODONE BITARTRATE AND ACETAMINOPHEN 1 TABLET: 5; 325 TABLET ORAL at 13:37

## 2019-07-02 RX ADMIN — MIDAZOLAM HYDROCHLORIDE 2 MG: 1 INJECTION, SOLUTION INTRAMUSCULAR; INTRAVENOUS at 12:24

## 2019-07-02 RX ADMIN — KETOROLAC TROMETHAMINE 30 MG: 30 INJECTION, SOLUTION INTRAMUSCULAR at 13:03

## 2019-07-02 RX ADMIN — SODIUM CHLORIDE, SODIUM LACTATE, POTASSIUM CHLORIDE, AND CALCIUM CHLORIDE: .6; .31; .03; .02 INJECTION, SOLUTION INTRAVENOUS at 12:24

## 2019-07-02 NOTE — ANESTHESIA PREPROCEDURE EVALUATION
Review of Systems/Medical History  Patient summary reviewed  Chart reviewed  History of anesthetic complications PONV    Cardiovascular  Hyperlipidemia, Hypertension ,    Pulmonary  Sleep apnea (Unable to tolerate CPAP) ,        GI/Hepatic    GERD well controlled,        Kidney stones,        Endo/Other  Diabetes (Prediabetic) type 2 Diet controlled,   Obesity    GYN       Hematology  Negative hematology ROS      Musculoskeletal    Arthritis     Neurology  Negative neurology ROS      Psychology         Lab Results   Component Value Date    WBC 6 1 01/16/2019    HGB 15 7 (H) 01/16/2019    HCT 46 3 (H) 01/16/2019    MCV 89 2 01/16/2019     01/16/2019     Lab Results   Component Value Date     01/11/2018    K 3 8 12/22/2018    CO2 30 12/22/2018     12/22/2018    BUN 14 12/22/2018    CREATININE 0 89 12/22/2018       Lab Results   Component Value Date    HGBA1C 6 1 06/17/2019       Physical Exam    Airway    Mallampati score: II  TM Distance: >3 FB  Neck ROM: full     Dental       Cardiovascular      Pulmonary      Other Findings        Anesthesia Plan  ASA Score- 2     Anesthesia Type- general with ASA Monitors  Additional Monitors:   Airway Plan: LMA  Plan Factors-    Induction- intravenous  Postoperative Plan- Plan for postoperative opioid use  Informed Consent- Anesthetic plan and risks discussed with patient  I personally reviewed this patient with the CRNA  Discussed and agreed on the Anesthesia Plan with the CRNA  Bennett Wall

## 2019-07-02 NOTE — OP NOTE
OPERATIVE REPORT  PATIENT NAME: Omer Briseno    :  1955  MRN: [id number]  Pt Location: AN  OR ROOM 05    SURGERY DATE: 2019    Surgeon(s) and Role:     * Nayal Barnes MD - Primary    Preop Diagnosis:  Kidney stones [N20 0]    Post-Op Diagnosis Codes:     * Kidney stones [N20 0]    Procedure(s) (LRB):  CYSTOSCOPY URETEROSCOPY WITH LITHOTRIPSY HOLMIUM LASER, RETROGRADE PYELOGRAM AND INSERTION STENT URETERAL (Right)    Specimen(s):  * No specimens in log *    Estimated Blood Loss:   Minimal    Drains:  Ureteral Drain/Stent Right ureter 6 Fr  (Active)   Number of days: 0       Anesthesia Type:   General    Operative Indications:  Kidney stones [N20 0]      Operative Findings:  Right impacted 1 cm proximal calculus    Complications:   None    Procedure and Technique:    After informed consent including the risks of bleeding, infection, ureteral injury, and need for secondary procedures, patient was placed supine in the operating room theater  Gen  anesthesia was administered  She was then placed in the dorsal lithotomy position and sterilely prepped and draped in usual fashion  Cystoscopy was performed the 21 Upper sorbian cystoscope 30° lens  This revealed a normal pendulous urethra  Inspection of the bladder revealed no abnormalities  Stent was seen emanating from the right orifice which was grasped and brought down to the meatus  Sensor guidewire was in placed and former stent removed  A dual-lumen catheter was then passed in a secondary guidewire placed  Dual-lumen catheter was removed and a ureteral access sheath then inserted with positioning confirmed on fluoroscopic guide  The flexible ureteroscope was loaded over the guidewire passed  A greater than 1 cm proximal calculus was encountered partially imbedded within the wall of the ureter  This is fragmented with the use of a holmium laser  Sequential passes were then made with a 4 wire basket in order to retrieve stone debris    Thereafter the laser fiber was removed  Ureteroscope was gradually withdrawn with no stone seen along the course of the ureter  Cystoscope was reassembled over the guidewire and a 6 x 26 double-J stent inserted without difficulty  Patient awakened from anesthesia having tolerated well       I was present for the entire procedure    Patient Disposition:  PACU     SIGNATURE: Raz Joseph MD  DATE: July 2, 2019  TIME: 1:03 PM

## 2019-07-02 NOTE — ANESTHESIA POSTPROCEDURE EVALUATION
Post-Op Assessment Note    CV Status:  Stable  Pain Score: 0    Pain management: adequate     Mental Status:  Alert and awake   Hydration Status:  Euvolemic   PONV Controlled:  Controlled   Airway Patency:  Patent   Post Op Vitals Reviewed: Yes      Staff: CRNA           BP  150/73   Temp  97 5   Pulse  74   Resp   12   SpO2   96

## 2019-07-16 ENCOUNTER — PROCEDURE VISIT (OUTPATIENT)
Dept: UROLOGY | Facility: CLINIC | Age: 64
End: 2019-07-16
Payer: COMMERCIAL

## 2019-07-16 VITALS
HEIGHT: 62 IN | HEART RATE: 69 BPM | SYSTOLIC BLOOD PRESSURE: 144 MMHG | WEIGHT: 173 LBS | BODY MASS INDEX: 31.83 KG/M2 | DIASTOLIC BLOOD PRESSURE: 78 MMHG

## 2019-07-16 DIAGNOSIS — N20.0 KIDNEY STONES: Primary | ICD-10-CM

## 2019-07-16 PROCEDURE — 52310 CYSTOSCOPY AND TREATMENT: CPT | Performed by: PHYSICIAN ASSISTANT

## 2019-07-16 NOTE — PROGRESS NOTES
Cystoscopy  Date/Time: 7/16/2019 3:51 PM  Performed by: Romi Collazo PA-C  Authorized by: Romi Collazo PA-C     Procedure details: simple removal of a foreign body, stone, or stent    Patient tolerance: Patient tolerated the procedure well with no immediate complications    Additional Procedure Details:  68-year-old female with history kidney stones  She had a cystoscopy with laser removal of a right ureteral  stone on July 2nd and is here for cystoscopy and stent removal   The patient is placed in the dorsal lithotomy position  Her groin is prepped and draped in the usual fashion  A 14 Serbian flexible cystoscope was passed through the urethra into the bladder  The bladder was completely collapsed and after adequate filling careful inspection revealed a reddened area on the posterior bladder wall likely from stent reaction  There was no other evidence of stone mass or extrinsic compression  Attention was turned towards the stent which was identified emanating from the right ureteral orifice and was low and near the bladder neck  It was grasped with the forceps and removed without difficulty  Patient is made aware of post procedure care and signs and symptoms of post stent removal that may occur  She verbally understood  Review of her previous imaging revealed residual  calculi in the left kidney  She will follow up in  6 weeks with a KUB prior to visit

## 2019-07-22 DIAGNOSIS — I10 ESSENTIAL HYPERTENSION: ICD-10-CM

## 2019-07-22 LAB
LEFT EYE DIABETIC RETINOPATHY: NORMAL
RIGHT EYE DIABETIC RETINOPATHY: NORMAL

## 2019-07-22 PROCEDURE — 3072F LOW RISK FOR RETINOPATHY: CPT | Performed by: FAMILY MEDICINE

## 2019-07-23 ENCOUNTER — TELEPHONE (OUTPATIENT)
Dept: FAMILY MEDICINE CLINIC | Facility: CLINIC | Age: 64
End: 2019-07-23
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
## 2019-07-24 NOTE — TELEPHONE ENCOUNTER
Please have patient schedule an office visit with Dr Estefania Aguirre or myself for release back to volunteering

## 2019-07-24 NOTE — TELEPHONE ENCOUNTER
Spoke with patient and she will contact them again and her knee comes and goes but she is good otherwise  She also volunteers for [employer] and they will not allow her to go back until she has a letter stating she can  Would you be able to write a note stating she is able to volunteer with no restrictions?   Please call to advise

## 2019-07-26 ENCOUNTER — OFFICE VISIT (OUTPATIENT)
Dept: FAMILY MEDICINE CLINIC | Facility: CLINIC | Age: 64
End: 2019-07-26
Payer: COMMERCIAL

## 2019-07-26 VITALS
OXYGEN SATURATION: 97 % | HEIGHT: 62 IN | TEMPERATURE: 96.7 F | WEIGHT: 175 LBS | RESPIRATION RATE: 16 BRPM | SYSTOLIC BLOOD PRESSURE: 118 MMHG | DIASTOLIC BLOOD PRESSURE: 74 MMHG | BODY MASS INDEX: 32.2 KG/M2 | HEART RATE: 73 BPM

## 2019-07-26 DIAGNOSIS — E04.1 THYROID NODULE: ICD-10-CM
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
## 2019-07-26 PROCEDURE — 99213 OFFICE O/P EST LOW 20 MIN: CPT | Performed by: NURSE PRACTITIONER
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
## 2019-07-26 NOTE — LETTER
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

## 2019-07-28 ENCOUNTER — HOSPITAL ENCOUNTER (OUTPATIENT)
Dept: ULTRASOUND IMAGING | Facility: HOSPITAL | Age: 64
Discharge: HOME | End: 2019-07-28
Payer: COMMERCIAL

## 2019-07-28 DIAGNOSIS — E04.1 THYROID NODULE: ICD-10-CM

## 2019-07-28 PROCEDURE — 76536 US EXAM OF HEAD AND NECK: CPT

## 2019-08-01 PROBLEM — E04.2 MULTIPLE THYROID NODULES: Status: ACTIVE | Noted: 2019-08-01

## 2019-08-02 ENCOUNTER — TELEPHONE (OUTPATIENT)
Dept: FAMILY MEDICINE CLINIC | Facility: CLINIC | Age: 64
End: 2019-08-02

## 2019-08-02 NOTE — TELEPHONE ENCOUNTER
----- Message from [employer] sent at 8/1/2019  8:12 PM EDT -----  Please let patient know she has multiple thyroid nodules on ultrasound, all of which appear benign and do not require any further testing at this time

## 2019-08-02 NOTE — RESULT ENCOUNTER NOTE
Please let patient know she has multiple thyroid nodules on ultrasound, all of which appear benign and do not require any further testing at this time

## 2019-08-22 ENCOUNTER — HOSPITAL ENCOUNTER (OUTPATIENT)
Dept: RADIOLOGY | Facility: HOSPITAL | Age: 64
Discharge: HOME | End: 2019-08-22
Payer: COMMERCIAL

## 2019-08-22 DIAGNOSIS — N20.0 KIDNEY STONES: ICD-10-CM

## 2019-08-22 PROCEDURE — 74018 RADEX ABDOMEN 1 VIEW: CPT

## 2019-08-27 ENCOUNTER — OFFICE VISIT (OUTPATIENT)
Dept: UROLOGY | Facility: CLINIC | Age: 64
End: 2019-08-27
Payer: COMMERCIAL

## 2019-08-27 ENCOUNTER — TELEPHONE (OUTPATIENT)
Dept: UROLOGY | Facility: MEDICAL CENTER | Age: 64
End: 2019-08-27

## 2019-08-27 VITALS — SYSTOLIC BLOOD PRESSURE: 122 MMHG | DIASTOLIC BLOOD PRESSURE: 84 MMHG

## 2019-08-27 DIAGNOSIS — N20.0 KIDNEY STONES: Primary | ICD-10-CM

## 2019-08-27 PROCEDURE — 99213 OFFICE O/P EST LOW 20 MIN: CPT | Performed by: PHYSICIAN ASSISTANT
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
## 2019-08-30 LAB
ALBUMIN SERPL-MCNC: 4.2 G/DL (ref 3.6–5.1)
ALBUMIN/GLOB SERPL: 1.8 (CALC) (ref 1–2.5)
ALP SERPL-CCNC: 89 U/L (ref 33–130)
ALT SERPL-CCNC: 19 U/L (ref 6–29)
AST SERPL-CCNC: 13 U/L (ref 10–35)
BASOPHILS # BLD AUTO: 41 CELLS/UL (ref 0–200)
BASOPHILS NFR BLD AUTO: 0.8 %
BILIRUB SERPL-MCNC: 0.4 MG/DL (ref 0.2–1.2)
BUN SERPL-MCNC: 18 MG/DL (ref 7–25)
BUN/CREAT SERPL: ABNORMAL (CALC) (ref 6–22)
CALCIUM SERPL-MCNC: 9.6 MG/DL (ref 8.6–10.4)
CHLORIDE SERPL-SCNC: 105 MMOL/L (ref 98–110)
CO2 SERPL-SCNC: 32 MMOL/L (ref 20–32)
CREAT SERPL-MCNC: 0.92 MG/DL (ref 0.5–0.99)
EOSINOPHIL # BLD AUTO: 122 CELLS/UL (ref 15–500)
EOSINOPHIL NFR BLD AUTO: 2.4 %
ERYTHROCYTE [DISTWIDTH] IN BLOOD BY AUTOMATED COUNT: 13 % (ref 11–15)
GLOBULIN SER CALC-MCNC: 2.4 G/DL (CALC) (ref 1.9–3.7)
GLUCOSE SERPL-MCNC: 104 MG/DL (ref 65–99)
HBA1C MFR BLD: 5.7 % OF TOTAL HGB
HCT VFR BLD AUTO: 46.8 % (ref 35–45)
HGB BLD-MCNC: 15.1 G/DL (ref 11.7–15.5)
LYMPHOCYTES # BLD AUTO: 1851 CELLS/UL (ref 850–3900)
LYMPHOCYTES NFR BLD AUTO: 36.3 %
MCH RBC QN AUTO: 29.6 PG (ref 27–33)
MCHC RBC AUTO-ENTMCNC: 32.3 G/DL (ref 32–36)
MCV RBC AUTO: 91.8 FL (ref 80–100)
MONOCYTES # BLD AUTO: 520 CELLS/UL (ref 200–950)
MONOCYTES NFR BLD AUTO: 10.2 %
NEUTROPHILS # BLD AUTO: 2565 CELLS/UL (ref 1500–7800)
NEUTROPHILS NFR BLD AUTO: 50.3 %
PLATELET # BLD AUTO: 323 THOUSAND/UL (ref 140–400)
PMV BLD REES-ECKER: 12 FL (ref 7.5–12.5)
POTASSIUM SERPL-SCNC: 4.2 MMOL/L (ref 3.5–5.3)
PROT SERPL-MCNC: 6.6 G/DL (ref 6.1–8.1)
RBC # BLD AUTO: 5.1 MILLION/UL (ref 3.8–5.1)
SL AMB EGFR AFRICAN AMERICAN: 76 ML/MIN/1.73M2
SL AMB EGFR NON AFRICAN AMERICAN: 65 ML/MIN/1.73M2
SODIUM SERPL-SCNC: 143 MMOL/L (ref 135–146)
WBC # BLD AUTO: 5.1 THOUSAND/UL (ref 3.8–10.8)

## 2019-09-03 ENCOUNTER — OFFICE VISIT (OUTPATIENT)
Dept: FAMILY MEDICINE CLINIC | Facility: CLINIC | Age: 64
End: 2019-09-03
Payer: COMMERCIAL

## 2019-09-03 VITALS
DIASTOLIC BLOOD PRESSURE: 74 MMHG | TEMPERATURE: 98.4 F | BODY MASS INDEX: 32.44 KG/M2 | RESPIRATION RATE: 16 BRPM | OXYGEN SATURATION: 97 % | HEIGHT: 62 IN | SYSTOLIC BLOOD PRESSURE: 136 MMHG | WEIGHT: 176.25 LBS | HEART RATE: 66 BPM

## 2019-09-03 DIAGNOSIS — E04.2 MULTIPLE THYROID NODULES: ICD-10-CM

## 2019-09-03 DIAGNOSIS — E11.9 TYPE 2 DIABETES MELLITUS WITHOUT COMPLICATION, WITHOUT LONG-TERM CURRENT USE OF INSULIN (HCC): ICD-10-CM

## 2019-09-03 DIAGNOSIS — Z00.00 ROUTINE HEALTH MAINTENANCE: ICD-10-CM

## 2019-09-03 DIAGNOSIS — I10 HYPERTENSION, UNSPECIFIED TYPE: Primary | ICD-10-CM

## 2019-09-03 DIAGNOSIS — G47.33 OBSTRUCTIVE SLEEP APNEA: ICD-10-CM

## 2019-09-03 DIAGNOSIS — R91.8 GROUND GLASS OPACITY PRESENT ON IMAGING OF LUNG: ICD-10-CM

## 2019-09-03 DIAGNOSIS — E78.2 MIXED HYPERLIPIDEMIA: ICD-10-CM

## 2019-09-03 PROCEDURE — 99214 OFFICE O/P EST MOD 30 MIN: CPT | Performed by: FAMILY MEDICINE

## 2019-09-03 PROCEDURE — 3075F SYST BP GE 130 - 139MM HG: CPT | Performed by: FAMILY MEDICINE

## 2019-09-03 PROCEDURE — 3078F DIAST BP <80 MM HG: CPT | Performed by: FAMILY MEDICINE

## 2019-09-03 PROCEDURE — 3008F BODY MASS INDEX DOCD: CPT | Performed by: FAMILY MEDICINE
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
## 2019-09-04 ENCOUNTER — HOSPITAL ENCOUNTER (OUTPATIENT)
Dept: CT IMAGING | Facility: HOSPITAL | Age: 64
Discharge: HOME | End: 2019-09-04
Payer: COMMERCIAL

## 2019-09-04 DIAGNOSIS — N20.0 KIDNEY STONES: ICD-10-CM

## 2019-09-04 PROCEDURE — 74176 CT ABD & PELVIS W/O CONTRAST: CPT

## 2019-09-10 ENCOUNTER — TELEPHONE (OUTPATIENT)
Dept: UROLOGY | Facility: MEDICAL CENTER | Age: 64
End: 2019-09-10

## 2019-09-10 NOTE — TELEPHONE ENCOUNTER
Patient followed by Syed Coronado at [facility]  Call from Radiology  There are test results available to view    Thank you

## 2019-09-11 ENCOUNTER — HOSPITAL ENCOUNTER (OUTPATIENT)
Dept: CT IMAGING | Facility: HOSPITAL | Age: 64
Discharge: HOME | End: 2019-09-11
Payer: COMMERCIAL

## 2019-09-11 DIAGNOSIS — R91.8 GROUND GLASS OPACITY PRESENT ON IMAGING OF LUNG: ICD-10-CM

## 2019-09-11 PROCEDURE — 71250 CT THORAX DX C-: CPT

## 2019-09-13 NOTE — TELEPHONE ENCOUNTER
Patient requesting results  Patient states she has a doctors appointment and will be home after 11 am today  Patient requesting call back to discuss

## 2019-09-16 ENCOUNTER — PREP FOR PROCEDURE (OUTPATIENT)
Dept: UROLOGY | Facility: CLINIC | Age: 64
End: 2019-09-16

## 2019-09-16 DIAGNOSIS — N20.0 KIDNEY STONES: Primary | ICD-10-CM

## 2019-09-16 DIAGNOSIS — Z01.818 PREOP EXAMINATION: ICD-10-CM

## 2019-09-16 RX ORDER — SODIUM CHLORIDE, SODIUM LACTATE, POTASSIUM CHLORIDE, CALCIUM CHLORIDE 600; 310; 30; 20 MG/100ML; MG/100ML; MG/100ML; MG/100ML
125 INJECTION, SOLUTION INTRAVENOUS CONTINUOUS
Status: CANCELLED | OUTPATIENT
Start: 2019-09-16

## 2019-09-19 DIAGNOSIS — I10 ESSENTIAL HYPERTENSION: ICD-10-CM

## 2019-09-19 RX ORDER — LISINOPRIL 40 MG/1
40 TABLET ORAL
Qty: 90 TABLET | Refills: 3 | Status: SHIPPED | OUTPATIENT
Start: 2019-09-19 | End: 2020-10-05

## 2019-09-20 ENCOUNTER — TELEPHONE (OUTPATIENT)
Dept: UROLOGY | Facility: MEDICAL CENTER | Age: 64
End: 2019-09-20

## 2019-09-20 DIAGNOSIS — R93.89 ABNORMAL CT OF THE CHEST: Primary | ICD-10-CM
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
## 2019-09-20 NOTE — TELEPHONE ENCOUNTER
Patient of [place], history of stones  Per last OV note, AP was recommending recommend a renoscopy  with  laser lithotripsy and stent insertion  Will route to AP and surgery scheduler to advise

## 2019-09-20 NOTE — TELEPHONE ENCOUNTER
Patient followed by Joseph LEMON at [facility]  Patient called in regard to upcoming surgery to be scheduled  No notes found in chart  Patient is reporting increased pain/burning and sharp pains  Would like a call back today to discuss  She can be reached at [phone]    Thank you

## 2019-09-21 ENCOUNTER — APPOINTMENT (OUTPATIENT)
Dept: LAB | Facility: CLINIC | Age: 64
End: 2019-09-21
Payer: COMMERCIAL

## 2019-09-21 DIAGNOSIS — Z01.818 PREOP EXAMINATION: ICD-10-CM

## 2019-09-21 DIAGNOSIS — N20.0 KIDNEY STONES: ICD-10-CM

## 2019-09-21 PROCEDURE — 87086 URINE CULTURE/COLONY COUNT: CPT

## 2019-09-22 LAB — BACTERIA UR CULT: NORMAL

## 2019-09-23 ENCOUNTER — TELEPHONE (OUTPATIENT)
Dept: FAMILY MEDICINE CLINIC | Facility: CLINIC | Age: 64
End: 2019-09-23
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
## 2019-09-26 ENCOUNTER — ANESTHESIA EVENT (OUTPATIENT)
Dept: PERIOP | Facility: HOSPITAL | Age: 64
End: 2019-09-26
Payer: COMMERCIAL

## 2019-09-26 NOTE — PRE-PROCEDURE INSTRUCTIONS
Pre-Surgery Instructions:   Medication Instructions    acetaminophen (TYLENOL) 500 mg tablet Instructed patient per Anesthesia Guidelines   amLODIPine (NORVASC) 10 mg tablet Instructed patient per Anesthesia Guidelines   Cholecalciferol (VITAMIN D3) 1000 units CAPS Instructed patient per Anesthesia Guidelines   famotidine (PEPCID) 10 mg tablet Instructed patient per Anesthesia Guidelines   fluticasone (FLONASE) 50 mcg/act nasal spray Instructed patient per Anesthesia Guidelines   Lactobacillus (PROBIOTIC ACIDOPHILUS) CAPS Instructed patient per Anesthesia Guidelines   lisinopril (ZESTRIL) 40 mg tablet Instructed patient per Anesthesia Guidelines   loratadine (CLARITIN) 10 mg tablet Instructed patient per Anesthesia Guidelines   metoprolol tartrate (LOPRESSOR) 25 mg tablet Instructed patient per Anesthesia Guidelines   repaglinide (PRANDIN) 0 5 mg tablet Instructed patient per Anesthesia Guidelines  Pre ACE/ARB Med Class     Continue this medication up to the evening before surgery/procedure, but do not take the morning of the day of surgery  Acetaminophen Med Class     Continue to take this medication on your normal schedule  If this is an oral medication and you take it in the morning, then you may take this medicine with a sip of water  Beta blocker Med Class     Continue to take this heart medication on your normal schedule  If this is an oral medication and you take it in the morning, then you may take this medicine with a sip of water  Calcium Channel Blocker Med Class     Continue to take this heart medication on your normal schedule  If this is an oral medication and you take it in the morning, then you may take this medicine with a sip of water  H2 Blocker Med Class     Continue to take this medication on your normal schedule  If this is an oral medication and you take it in the morning, then you may take this medicine with a sip of water    Insulin Med Class Pre-Surgery/Procedure Instructions for Adult Patients who Take Medicine for Diabetes or to Control their Blood Sugar     Day Before Surgery/Procedure  Use the directions based on the type of medicine you take for your diabetes  1  If you are having a procedure that does not require a bowel prep:  ? Pre-Mixed Insulin (Intermediate Acting: Humalog 75/25, Humulin 70/30  Novolog 70/30, Regular Insulin)  § Take ½ your regular dose the evening before your procedure  ? Rapid/Fast Acting Insulin/Long Acting Insulin (Humalog U200, NovoLog, Apidra, Lantus, Levemir, Lesleigh Gabby, Kerrick)  § Take your FULL regular dose the day before procedure  ? Oral Diabetic Medicines including Glipizide/Glimepiride/Glucotrol (sulfonylurea)  § Take your regular dose with dinner the evening before your procedure  2  If you are having a procedure (e g  Colonoscopy) that requires a bowel prep and you are allowed to have at least a clear liquid diet:  ? Pre-Mixed Insulin (Intermediate Acting: Humalog 75/25, Humulin 70/30, Novolog 70/30, Regular Insulin)  § Take ½ your regular dose the evening before your procedure  ? Rapid/Fast Acting Insulin (Humalog U200, NovoLog, Apidra, Fiasp)  § Take ½ your regular dose the evening before your procedure  ? Long Acting Insulin (Lantus, Levemir, Lesleigh Gabby)  § Take your FULL regular dose the day before procedure  ? Oral Glipizide/Glimepiride/Glucotrol (sulfonylurea)  § Take ½ your regular dose the evening before your procedure  ? Oral Diabetic Medicines that are NOT Glipizide/Glimepiride/Glucotrol  § Take your regular dose with dinner in the evening before your procedure      Day of Surgery/Procedure  · Long Acting Insulin (Lantus, Levemir, Lesleigh Gabby)  ? If you usually take your Long-Acting Insulin in the morning, take the full dose as scheduled    · With the exception of the morning Long-Acting Insulin noted above, DO NOT take ANY diabetic medicine on the day of your procedure unless you were instructed by the doctor who manages your diabetic medicines  · Continue to check your blood sugars  · If you have an insulin pump then consult with your Endocrinologist for instructions  · If you cannot see your Endocrinologist, on the day of the procedure set your insulin pump to your basal rate only   Please bring your insulin pump supplies to the hospital      This Educational material has been approved by the Patient Education Advisory Committee  Date prepared: 1/17/2018          Expiration date: 1/17/2019        Approval Number:         op instructions reviewed; verbalized understanding

## 2019-09-27 ENCOUNTER — APPOINTMENT (OUTPATIENT)
Dept: RADIOLOGY | Facility: HOSPITAL | Age: 64
End: 2019-09-27
Payer: COMMERCIAL

## 2019-09-27 ENCOUNTER — HOSPITAL ENCOUNTER (OUTPATIENT)
Facility: HOSPITAL | Age: 64
Setting detail: OUTPATIENT SURGERY
Discharge: HOME | End: 2019-09-27
Attending: UROLOGY | Admitting: UROLOGY
Payer: COMMERCIAL

## 2019-09-27 ENCOUNTER — ANESTHESIA (OUTPATIENT)
Dept: PERIOP | Facility: HOSPITAL | Age: 64
End: 2019-09-27
Payer: COMMERCIAL

## 2019-09-27 VITALS
OXYGEN SATURATION: 98 % | BODY MASS INDEX: 32.39 KG/M2 | HEIGHT: 62 IN | WEIGHT: 176 LBS | RESPIRATION RATE: 20 BRPM | HEART RATE: 75 BPM | SYSTOLIC BLOOD PRESSURE: 187 MMHG | DIASTOLIC BLOOD PRESSURE: 79 MMHG | TEMPERATURE: 98.3 F

## 2019-09-27 DIAGNOSIS — N20.0 KIDNEY STONES: Primary | ICD-10-CM

## 2019-09-27 LAB — GLUCOSE SERPL-MCNC: 91 MG/DL (ref 65–140)

## 2019-09-27 PROCEDURE — C2617 STENT, NON-COR, TEM W/O DEL: HCPCS | Performed by: UROLOGY

## 2019-09-27 PROCEDURE — 87086 URINE CULTURE/COLONY COUNT: CPT | Performed by: UROLOGY

## 2019-09-27 PROCEDURE — 52356 CYSTO/URETERO W/LITHOTRIPSY: CPT | Performed by: UROLOGY

## 2019-09-27 PROCEDURE — 74420 UROGRAPHY RTRGR +-KUB: CPT

## 2019-09-27 PROCEDURE — 82948 REAGENT STRIP/BLOOD GLUCOSE: CPT

## 2019-09-27 PROCEDURE — C1758 CATHETER, URETERAL: HCPCS | Performed by: UROLOGY

## 2019-09-27 PROCEDURE — NC001 PR NO CHARGE: Performed by: UROLOGY

## 2019-09-27 PROCEDURE — C1769 GUIDE WIRE: HCPCS | Performed by: UROLOGY

## 2019-09-27 DEVICE — STENT URETERAL 6FR 22CM INLAY OPTIMA W/NITINOL GDWR: Type: IMPLANTABLE DEVICE | Site: URETER | Status: FUNCTIONAL

## 2019-09-27 RX ORDER — LIDOCAINE HYDROCHLORIDE 10 MG/ML
0.5 INJECTION, SOLUTION EPIDURAL; INFILTRATION; INTRACAUDAL; PERINEURAL ONCE AS NEEDED
Status: COMPLETED | OUTPATIENT
Start: 2019-09-27 | End: 2019-09-27

## 2019-09-27 RX ORDER — ONDANSETRON 2 MG/ML
INJECTION INTRAMUSCULAR; INTRAVENOUS AS NEEDED
Status: DISCONTINUED | OUTPATIENT
Start: 2019-09-27 | End: 2019-09-27 | Stop reason: SURG

## 2019-09-27 RX ORDER — LIDOCAINE HYDROCHLORIDE 10 MG/ML
INJECTION, SOLUTION INFILTRATION; PERINEURAL AS NEEDED
Status: DISCONTINUED | OUTPATIENT
Start: 2019-09-27 | End: 2019-09-27 | Stop reason: SURG

## 2019-09-27 RX ORDER — CEPHALEXIN 500 MG/1
500 CAPSULE ORAL 3 TIMES DAILY
Qty: 9 CAPSULE | Refills: 0 | Status: SHIPPED | OUTPATIENT
Start: 2019-09-27 | End: 2019-09-30

## 2019-09-27 RX ORDER — PROMETHAZINE HYDROCHLORIDE 25 MG/ML
12.5 INJECTION, SOLUTION INTRAMUSCULAR; INTRAVENOUS ONCE AS NEEDED
Status: DISCONTINUED | OUTPATIENT
Start: 2019-09-27 | End: 2019-09-27 | Stop reason: HOSPADM

## 2019-09-27 RX ORDER — SODIUM CHLORIDE, SODIUM LACTATE, POTASSIUM CHLORIDE, CALCIUM CHLORIDE 600; 310; 30; 20 MG/100ML; MG/100ML; MG/100ML; MG/100ML
125 INJECTION, SOLUTION INTRAVENOUS CONTINUOUS
Status: DISCONTINUED | OUTPATIENT
Start: 2019-09-27 | End: 2019-09-27 | Stop reason: HOSPADM

## 2019-09-27 RX ORDER — PROPOFOL 10 MG/ML
INJECTION, EMULSION INTRAVENOUS AS NEEDED
Status: DISCONTINUED | OUTPATIENT
Start: 2019-09-27 | End: 2019-09-27 | Stop reason: SURG

## 2019-09-27 RX ORDER — HYDROCODONE BITARTRATE AND ACETAMINOPHEN 5; 325 MG/1; MG/1
2 TABLET ORAL EVERY 6 HOURS PRN
Qty: 8 TABLET | Refills: 0 | Status: SHIPPED | OUTPATIENT
Start: 2019-09-27 | End: 2019-10-07

## 2019-09-27 RX ORDER — FENTANYL CITRATE 50 UG/ML
INJECTION, SOLUTION INTRAMUSCULAR; INTRAVENOUS AS NEEDED
Status: DISCONTINUED | OUTPATIENT
Start: 2019-09-27 | End: 2019-09-27 | Stop reason: SURG

## 2019-09-27 RX ORDER — HYDRALAZINE HYDROCHLORIDE 20 MG/ML
5 INJECTION INTRAMUSCULAR; INTRAVENOUS
Status: DISCONTINUED | OUTPATIENT
Start: 2019-09-27 | End: 2019-09-27 | Stop reason: HOSPADM

## 2019-09-27 RX ORDER — CEFAZOLIN SODIUM 2 G/50ML
2000 SOLUTION INTRAVENOUS
Status: DISCONTINUED | OUTPATIENT
Start: 2019-09-27 | End: 2019-09-27 | Stop reason: HOSPADM

## 2019-09-27 RX ORDER — MAGNESIUM HYDROXIDE 1200 MG/15ML
LIQUID ORAL AS NEEDED
Status: DISCONTINUED | OUTPATIENT
Start: 2019-09-27 | End: 2019-09-27 | Stop reason: HOSPADM

## 2019-09-27 RX ORDER — METOCLOPRAMIDE HYDROCHLORIDE 5 MG/ML
10 INJECTION INTRAMUSCULAR; INTRAVENOUS ONCE AS NEEDED
Status: DISCONTINUED | OUTPATIENT
Start: 2019-09-27 | End: 2019-09-27 | Stop reason: HOSPADM

## 2019-09-27 RX ORDER — KETAMINE HYDROCHLORIDE 50 MG/ML
INJECTION, SOLUTION, CONCENTRATE INTRAMUSCULAR; INTRAVENOUS AS NEEDED
Status: DISCONTINUED | OUTPATIENT
Start: 2019-09-27 | End: 2019-09-27 | Stop reason: SURG

## 2019-09-27 RX ORDER — LABETALOL 20 MG/4 ML (5 MG/ML) INTRAVENOUS SYRINGE
10
Status: DISCONTINUED | OUTPATIENT
Start: 2019-09-27 | End: 2019-09-27 | Stop reason: HOSPADM

## 2019-09-27 RX ORDER — HYDROMORPHONE HCL/PF 1 MG/ML
0.2 SYRINGE (ML) INJECTION
Status: DISCONTINUED | OUTPATIENT
Start: 2019-09-27 | End: 2019-09-27 | Stop reason: HOSPADM

## 2019-09-27 RX ORDER — ONDANSETRON 2 MG/ML
4 INJECTION INTRAMUSCULAR; INTRAVENOUS ONCE AS NEEDED
Status: DISCONTINUED | OUTPATIENT
Start: 2019-09-27 | End: 2019-09-27 | Stop reason: HOSPADM

## 2019-09-27 RX ORDER — DEXAMETHASONE SODIUM PHOSPHATE 10 MG/ML
INJECTION, SOLUTION INTRAMUSCULAR; INTRAVENOUS AS NEEDED
Status: DISCONTINUED | OUTPATIENT
Start: 2019-09-27 | End: 2019-09-27 | Stop reason: SURG

## 2019-09-27 RX ORDER — HYDROMORPHONE HCL/PF 1 MG/ML
0.5 SYRINGE (ML) INJECTION
Status: DISCONTINUED | OUTPATIENT
Start: 2019-09-27 | End: 2019-09-27 | Stop reason: HOSPADM

## 2019-09-27 RX ORDER — MIDAZOLAM HYDROCHLORIDE 1 MG/ML
INJECTION INTRAMUSCULAR; INTRAVENOUS AS NEEDED
Status: DISCONTINUED | OUTPATIENT
Start: 2019-09-27 | End: 2019-09-27 | Stop reason: SURG

## 2019-09-27 RX ORDER — FENTANYL CITRATE/PF 50 MCG/ML
25 SYRINGE (ML) INJECTION
Status: DISCONTINUED | OUTPATIENT
Start: 2019-09-27 | End: 2019-09-27 | Stop reason: HOSPADM

## 2019-09-27 RX ORDER — CEFAZOLIN SODIUM 1 G/3ML
INJECTION, POWDER, FOR SOLUTION INTRAMUSCULAR; INTRAVENOUS AS NEEDED
Status: DISCONTINUED | OUTPATIENT
Start: 2019-09-27 | End: 2019-09-27 | Stop reason: SURG

## 2019-09-27 RX ORDER — HYDROCODONE BITARTRATE AND ACETAMINOPHEN 5; 325 MG/1; MG/1
2 TABLET ORAL EVERY 4 HOURS PRN
Status: DISCONTINUED | OUTPATIENT
Start: 2019-09-27 | End: 2019-09-27 | Stop reason: HOSPADM

## 2019-09-27 RX ORDER — ALBUTEROL SULFATE 2.5 MG/3ML
2.5 SOLUTION RESPIRATORY (INHALATION) ONCE AS NEEDED
Status: DISCONTINUED | OUTPATIENT
Start: 2019-09-27 | End: 2019-09-27 | Stop reason: HOSPADM

## 2019-09-27 RX ADMIN — DEXAMETHASONE SODIUM PHOSPHATE 5 MG: 10 INJECTION, SOLUTION INTRAMUSCULAR; INTRAVENOUS at 10:30

## 2019-09-27 RX ADMIN — FENTANYL CITRATE 25 MCG: 50 INJECTION, SOLUTION INTRAMUSCULAR; INTRAVENOUS at 10:22

## 2019-09-27 RX ADMIN — KETAMINE HYDROCHLORIDE 25 MG: 50 INJECTION, SOLUTION INTRAMUSCULAR; INTRAVENOUS at 10:25

## 2019-09-27 RX ADMIN — PROPOFOL 50 MG: 10 INJECTION, EMULSION INTRAVENOUS at 10:47

## 2019-09-27 RX ADMIN — ONDANSETRON 4 MG: 2 INJECTION INTRAMUSCULAR; INTRAVENOUS at 10:30

## 2019-09-27 RX ADMIN — PROPOFOL 150 MG: 10 INJECTION, EMULSION INTRAVENOUS at 10:25

## 2019-09-27 RX ADMIN — LIDOCAINE HYDROCHLORIDE 0.5 ML: 10 INJECTION, SOLUTION EPIDURAL; INFILTRATION; INTRACAUDAL; PERINEURAL at 08:16

## 2019-09-27 RX ADMIN — MIDAZOLAM 1 MG: 1 INJECTION INTRAMUSCULAR; INTRAVENOUS at 10:14

## 2019-09-27 RX ADMIN — LIDOCAINE HYDROCHLORIDE 70 MG: 10 INJECTION, SOLUTION INFILTRATION; PERINEURAL at 10:23

## 2019-09-27 RX ADMIN — PHENYLEPHRINE HYDROCHLORIDE 100 MCG: 10 INJECTION INTRAVENOUS at 10:34

## 2019-09-27 RX ADMIN — SODIUM CHLORIDE, SODIUM LACTATE, POTASSIUM CHLORIDE, AND CALCIUM CHLORIDE 125 ML/HR: .6; .31; .03; .02 INJECTION, SOLUTION INTRAVENOUS at 08:16

## 2019-09-27 RX ADMIN — CEFAZOLIN 2000 MG: 1 INJECTION, POWDER, FOR SOLUTION INTRAVENOUS at 10:28

## 2019-09-27 NOTE — DISCHARGE INSTRUCTIONS
Today you underwent left ureteroscopy  Based on the number of stone fragments I left a left ureteral stent in place without a string  This will be removed in the office in the next 4 weeks  Call for appointment with Dr Natalia Khan  [phone] to arrange  Ureteroscopy   WHAT YOU NEED TO KNOW:   A ureteroscopy is a procedure to examine in the inside of your urinary tract, which includes your urethra, bladder, ureters, and kidneys  A ureteroscope is a small, thin tube with a light and camera on the end  Ureteroscopy can help your healthcare provider diagnose and treat problems in your urinary tract, such as kidney stones  DISCHARGE INSTRUCTIONS:   Medicine:   · Antibiotics  may be given to treat or prevent an infection  · Take your medicine as directed  Contact your healthcare provider if you think your medicine is not helping or if you have side effects  Tell him or her if you are allergic to any medicine  Keep a list of the medicines, vitamins, and herbs you take  Include the amounts, and when and why you take them  Bring the list or the pill bottles to follow-up visits  Carry your medicine list with you in case of an emergency  Follow up with your healthcare provider as directed:  Write down your questions so you remember to ask them during your visits  Drink liquids as directed  Liquids can help prevent kidney stones and urinary tract infections  Drink water and limit the amount of caffeine you drink  Caffeine may be found in coffee, tea, soda, sports drinks, and foods  Ask your healthcare provider how much liquid to drink each day  Contact your healthcare provider if:   · You have a fever  · You cannot urinate  · You have blood in your urine  · You are vomiting  · You have pain in your abdomen or side  · You have questions or concerns about your condition or care

## 2019-09-27 NOTE — H&P
Daren Martinez is a 69-year-old female who recently underwent right ureteroscopy with Dr Stanley Pretty  She now has an 11 mm left lower pole nonobstructing calculus  There is also a question of a right UPJ obstruction  BP (!) 173/82   Pulse 66   Temp 98 5 °F (36 9 °C) (Tympanic)   Resp 20   Ht 5' 2" (1 575 m)   Wt 79 8 kg (176 lb)   SpO2 97%   BMI 32 19 kg/m²       On examination she is in no acute distress  Lungs clear  Cardiac regular    Impression:  Possible right UPJ obstruction, left 11 mm nonobstructing lower renal calculus    Plan:    I recommend cystoscopy with right retrograde pyelography  And possible stent insertion  If there is any sign of a right UPJ obstruction we discussed not placing a stent and performing a Mag 3 renal scan with Lasix in the outpatient setting to assess for obstruction  Otherwise plan for cystoscopy with left ureteroscopy, holmium laser lithotripsy, left retrograde pyelography and left ureteral stent insertion  Risk and benefits of the procedure discussed and reviewed  Informed consent obtained

## 2019-09-27 NOTE — OP NOTE
OPERATIVE REPORT  PATIENT NAME: Bandar Silva    :  1955  MRN: [id number]  Pt Location: [facility]ST[facility] ROOM 01    SURGERY DATE: 2019    Surgeon(s) and Role:     Humaira Gasca MD - Primary    Preop Diagnosis:  Kidney stones [N20 0]  Left lower pole renal calculus    Post-Op Diagnosis Codes:     * Kidney stones [N20 0]  Left lower pole renal calculus    Procedure(s) (LRB):  CYSTOSCOPY URETEROSCOPY /RENOSCOPY WITH LITHOTRIPSY HOLMIUM LASER, Bilateral RETROGRADE PYELOGRAM AND INSERTION STENT URETERAL (Left)    Specimen(s):  ID Type Source Tests Collected by Time Destination   A :  Urine Urine, Cystoscopic URINE CULTURE Ricarda Hilliard MD 2019 1038        Estimated Blood Loss:   Minimal    Drains:  Ureteral Drain/Stent Right ureter 6 Fr  (Active)   Number of days: 87       Anesthesia Type:   General    Operative Indications:  Kidney stones [N20 0]      Operative Findings:  Normal right retrograde pyelogram, large left lower pole renal calculus decimated with holmium laser lithotripsy  Left ureteral stent placed without a string    Complications:   None    Procedure and Technique:  Chandana Hayes is a 15-year-old female with a history of a left lower pole renal calculus measuring up to 11 mm  She previously underwent right ureteroscopy  There was question of a right UPJ configuration as well     She now returns to the operating room on 2019 to undergo left-sided ureteroscopy with holmium laser lithotripsy  Risk and benefits of the procedure were discussed and reviewed  Informed consent was obtained  After the smooth induction of general LMA anesthesia, the patient was placed in the dorsal lithotomy position  Her genitalia was prepped and draped in a sterile fashion  Intravenous antibiotics were administered  A timeout was performed with all members of the operative team confirming the patient's identity, procedure to be performed, and laterality of the case      A [address] [place] [address] rigid cystoscope with 30° lens was inserted  The bladder was thoroughly inspected  Attention was focused on the right ureteral orifice  A right retrograde was performed and was normal   There was no sign of right UPJ obstruction  A 5 Qatari open-ended catheter was inserted and a left retrograde pyelogram was performed revealing lower pole nephrolithiasis  A wire was then reinserted into the left collecting system followed by a dual-lumen catheter so that a 2nd wire could be placed  A ureteral access sheath was then inserted  An Olympus flexible ureteroscope was then passed  The kidney was thoroughly inspected  A stone was identified in the left lower pole  The 200 µ holmium laser fiber was utilized to [employer] the stone until the fragments were passable  Contrast was injected  The kidney was thoroughly reinspected  Based on the number of fragments I made the decision to leave a left ureteral stent without a string  The scope was slowly withdrawn through the ureter  There were no residual stones within the ureter  The wire was backloaded through the cystoscope and a left 22-6 Western Melissa double-J ureteral stent was then placed in the standard fashion  The proximal coil was appreciated in the left renal pelvis and the distal coil was visualized within the bladder  A string was was not left in place  The bladder was emptied and the cystoscope was removed  Overall the patient tolerated the procedure well and there were no complications  The patient was extubated in the operating room and transferred to the PACU in stable condition at the conclusion of the case        Patient Disposition:  PACU stable and extubated    SIGNATURE: Lindell Pallas, MD  DATE: September 27, 2019  TIME: 11:44 AM

## 2019-09-27 NOTE — ANESTHESIA POSTPROCEDURE EVALUATION
Post-Op Assessment Note    CV Status:  Stable  Pain Score: 0    Pain management: adequate     Mental Status:  Alert   Hydration Status:  Stable   PONV Controlled:  None   Airway Patency:  Patent   Post Op Vitals Reviewed: Yes      Staff: CRNA           BP (!) 171/91 (09/27/19 1143)    Temp 97 9 °F (36 6 °C) (09/27/19 1143)    Pulse 82 (09/27/19 1143)   Resp 15 (09/27/19 1143)    SpO2 99 % (09/27/19 1143)

## 2019-09-27 NOTE — ANESTHESIA PREPROCEDURE EVALUATION
Review of Systems/Medical History  Patient summary reviewed  Chart reviewed  History of anesthetic complications PONV    Cardiovascular  Exercise tolerance (METS): >4,  Hyperlipidemia, Hypertension on > 1 medication, No orthopnea, No PND, No ALONZO,    Pulmonary  Sleep apnea (Unable to tolerate CPAP) ,        GI/Hepatic    GERD well controlled,        Kidney stones,        Endo/Other  Diabetes (Prediabetic) type 2 Diet controlled, History of thyroid disease ,   Obesity    GYN  Negative gynecology ROS          Hematology  Negative hematology ROS      Musculoskeletal    Arthritis     Neurology  Negative neurology ROS      Psychology   Negative psychology ROS                Physical Exam    Airway    Mallampati score: II  TM Distance: >3 FB  Neck ROM: full     Dental   No notable dental hx     Cardiovascular  Rhythm: regular, Rate: normal, Cardiovascular exam normal    Pulmonary  Pulmonary exam normal Breath sounds clear to auscultation,     Other Findings        Anesthesia Plan  ASA Score- 2     Anesthesia Type- general with ASA Monitors  Additional Monitors:   Airway Plan: ETT and LMA  Plan Factors-    Induction- intravenous  Postoperative Plan- Plan for postoperative opioid use  Informed Consent- Anesthetic plan and risks discussed with patient  I personally reviewed this patient with the CRNA  Discussed and agreed on the Anesthesia Plan with the CRNA  Johnna Oshea

## 2019-09-29 LAB — BACTERIA UR CULT: NORMAL

## 2019-10-01 ENCOUNTER — TELEPHONE (OUTPATIENT)
Dept: UROLOGY | Facility: AMBULATORY SURGERY CENTER | Age: 64
End: 2019-10-01

## 2019-10-01 DIAGNOSIS — N20.0 KIDNEY STONES: Primary | ICD-10-CM

## 2019-10-01 NOTE — TELEPHONE ENCOUNTER
Post Op Note    Natali Olivarez is a 70 y o  female s/p CYSTOSCOPY URETEROSCOPY /RENOSCOPY WITH LITHOTRIPSY HOLMIUM LASER, Bilateral RETROGRADE PYELOGRAM AND INSERTION STENT URETERAL (Left Bladder) performed 09/27/2019  Natali Olivarez is a patient of Dr Dorothy Johnson and is seen at the [facility] office  How would you rate your pain on a scale from 1 to 10, 10 being the worst pain ever? 1- 5 today, on and off but better today, this stent in general is better than the stent she had on the other side  Overall she has not need narcotic pain meds today and would like to try to avoid them moving forward  She was encouraged she may use motrin or tylenol as needed for stent discomfort  Have you had a fever? No  Have your bowel movements been regular? Yes, BM yesterday she was constipated and took metamucil  Do you have any difficulty urinating? No  If the patient has a drain- are you having any issues with the drain? No, ureteral stent, some discomfort but no major issues    Do you have any other questions or concerns that I can address at this time? NO     Patient reports she can walk better and go up and down steps better  In general most of the pain r/t kidney stone is gone  She does note an "achiness" from the stent  Reports urine is light pink  Per Dr Dorothy Johnson FU in 4 weeks for cysto stent removal with advanced practitioner with KUB prior to visit  Patient scheduled 10/30 with Estela Sy at [place] and KUB order entered in the system  Patient confirmed appt details

## 2019-10-01 NOTE — TELEPHONE ENCOUNTER
Patient underwent ureteroscopy for a large stone  She has a left ureteral stent in place without a string  Recommend obtaining KUB prior to cystoscopy with stent removal with Hildegard Ion in the near future

## 2019-10-02 DIAGNOSIS — N20.0 KIDNEY STONES: Primary | ICD-10-CM

## 2019-10-03 ENCOUNTER — HOSPITAL ENCOUNTER (OUTPATIENT)
Dept: BONE DENSITY | Facility: IMAGING CENTER | Age: 64
Discharge: HOME | End: 2019-10-03
Payer: COMMERCIAL

## 2019-10-03 VITALS — BODY MASS INDEX: 33.23 KG/M2 | HEIGHT: 61 IN | WEIGHT: 176 LBS

## 2019-10-03 DIAGNOSIS — Z12.31 ENCOUNTER FOR SCREENING MAMMOGRAM FOR MALIGNANT NEOPLASM OF BREAST: ICD-10-CM

## 2019-10-03 PROCEDURE — 77067 SCR MAMMO BI INCL CAD: CPT

## 2019-10-03 PROCEDURE — 77063 BREAST TOMOSYNTHESIS BI: CPT

## 2019-10-10 ENCOUNTER — ANNUAL EXAM (OUTPATIENT)
Dept: OBGYN CLINIC | Facility: CLINIC | Age: 64
End: 2019-10-10
Payer: COMMERCIAL

## 2019-10-10 ENCOUNTER — TRANSCRIBE ORDERS (OUTPATIENT)
Dept: MAMMOGRAPHY | Facility: CLINIC | Age: 64
End: 2019-10-10

## 2019-10-10 ENCOUNTER — HOSPITAL ENCOUNTER (OUTPATIENT)
Dept: MAMMOGRAPHY | Facility: CLINIC | Age: 64
Discharge: HOME | End: 2019-10-10
Payer: COMMERCIAL

## 2019-10-10 ENCOUNTER — HOSPITAL ENCOUNTER (OUTPATIENT)
Dept: ULTRASOUND IMAGING | Facility: CLINIC | Age: 64
Discharge: HOME | End: 2019-10-10
Payer: COMMERCIAL

## 2019-10-10 VITALS
WEIGHT: 175.2 LBS | DIASTOLIC BLOOD PRESSURE: 82 MMHG | HEIGHT: 62 IN | BODY MASS INDEX: 32.24 KG/M2 | SYSTOLIC BLOOD PRESSURE: 130 MMHG

## 2019-10-10 DIAGNOSIS — R92.8 ABNORMAL MAMMOGRAM: ICD-10-CM

## 2019-10-10 DIAGNOSIS — Z01.419 ENCOUNTER FOR GYNECOLOGICAL EXAMINATION WITHOUT ABNORMAL FINDING: Primary | ICD-10-CM

## 2019-10-10 DIAGNOSIS — R92.8 ABNORMAL FINDINGS ON DIAGNOSTIC IMAGING OF BREAST: Primary | ICD-10-CM

## 2019-10-10 PROCEDURE — 77065 DX MAMMO INCL CAD UNI: CPT

## 2019-10-10 PROCEDURE — G0279 TOMOSYNTHESIS, MAMMO: HCPCS

## 2019-10-10 PROCEDURE — G0101 CA SCREEN;PELVIC/BREAST EXAM: HCPCS | Performed by: OBSTETRICS & GYNECOLOGY

## 2019-10-10 PROCEDURE — 76642 ULTRASOUND BREAST LIMITED: CPT
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
## 2019-10-14 ENCOUNTER — TELEPHONE (OUTPATIENT)
Dept: OBGYN CLINIC | Facility: CLINIC | Age: 64
End: 2019-10-14

## 2019-10-14 DIAGNOSIS — N63.0 BREAST LUMP: Primary | ICD-10-CM

## 2019-10-14 NOTE — TELEPHONE ENCOUNTER
----- Message from Juan Manuel Almanza MD sent at 10/14/2019 10:05 AM EDT -----   Right breast diagnostic mammogram and ultrasound in 3 months

## 2019-10-24 ENCOUNTER — HOSPITAL ENCOUNTER (OUTPATIENT)
Dept: RADIOLOGY | Facility: HOSPITAL | Age: 64
Discharge: HOME | End: 2019-10-24
Payer: COMMERCIAL

## 2019-10-24 DIAGNOSIS — R93.89 ABNORMAL CT OF THE CHEST: ICD-10-CM

## 2019-10-24 DIAGNOSIS — N20.0 KIDNEY STONES: ICD-10-CM

## 2019-10-24 PROCEDURE — 71120 X-RAY EXAM BREASTBONE 2/>VWS: CPT

## 2019-10-24 PROCEDURE — 74018 RADEX ABDOMEN 1 VIEW: CPT

## 2019-10-30 ENCOUNTER — PROCEDURE VISIT (OUTPATIENT)
Dept: UROLOGY | Facility: CLINIC | Age: 64
End: 2019-10-30
Payer: COMMERCIAL

## 2019-10-30 VITALS
SYSTOLIC BLOOD PRESSURE: 118 MMHG | HEART RATE: 74 BPM | HEIGHT: 62 IN | BODY MASS INDEX: 32.02 KG/M2 | DIASTOLIC BLOOD PRESSURE: 70 MMHG | WEIGHT: 174 LBS

## 2019-10-30 DIAGNOSIS — N20.0 KIDNEY STONES: Primary | ICD-10-CM

## 2019-10-30 PROCEDURE — 52310 CYSTOSCOPY AND TREATMENT: CPT | Performed by: PHYSICIAN ASSISTANT

## 2019-10-30 NOTE — RESULT ENCOUNTER NOTE
Please let patient know that her x-ray of the sternum was normal   The radiologist states if he does receive outside films/CT, he can always compare it with the current study    Thank you

## 2019-10-30 NOTE — PROGRESS NOTES
Cystoscopy 51  Date/Time: 10/30/2019 1:53 PM  Performed by: Georgina Neely PA-C  Authorized by: Georgina Neely PA-C     Procedure details: simple removal of a foreign body, stone, or stent    Patient tolerance: Patient tolerated the procedure well with no immediate complications    Additional Procedure Details: 76-year-old female with history of kidney stones  Most recently she had a ureteroscopy /renoscopy September 24th for laser lithotripsy of a large left renal stone  She is now here for cystoscopy and stent removal   Review of her postoperative KUB showed no stones along the course of the ureter with some debris in the left lower pole  Patient is placed in the dorsal lithotomy position  Her groin is prepped and draped in the usual fashion  A 14 Marshallese flexible cystoscope is passed per the meatus  Urine in the bladder was clear  Following adequate filling the bladder was carefully inspected revealing no abnormalities  The stent was identified tight to the left ureteral orifice with some edema noted  It was grasped with the forceps and removed without difficulty  The patient is made aware  of post procedure care and signs and symptoms of post stent removal that may occur and understands  I explained to her she may pass some gravel following stent removal as well  She will follow up in 4 weeks with a repeat KUB prior to visit

## 2019-11-08 ENCOUNTER — TELEPHONE (OUTPATIENT)
Dept: UROLOGY | Facility: MEDICAL CENTER | Age: 64
End: 2019-11-08

## 2019-11-08 DIAGNOSIS — R30.0 BURNING WITH URINATION: Primary | ICD-10-CM

## 2019-11-08 NOTE — TELEPHONE ENCOUNTER
Patient s/p CYSTOSCOPY URETEROSCOPY /RENOSCOPY WITH LITHOTRIPSY HOLMIUM LASER, Bilateral RETROGRADE PYELOGRAM AND INSERTION STENT URETERAL (Left) 9/27/2019 with Dr Soo Ortega  Cysto/stent removed by Tyson Machado PA-C 10/30/2019  Patient calling today stating that she is still experiencing burning with urination and is concerned she may have an infection  Urine testing ordered to rule this out  Patient will go to [address] [place] [address] outpatient lab  Office to call back with results when final, 2-3 days  Patient encouraged to continue hydrating well with water in meantime  Patient verbalized understanding and agrees with plan

## 2019-11-21 ENCOUNTER — APPOINTMENT (OUTPATIENT)
Dept: LAB | Facility: CLINIC | Age: 64
End: 2019-11-21
Payer: COMMERCIAL

## 2019-11-21 ENCOUNTER — HOSPITAL ENCOUNTER (OUTPATIENT)
Dept: RADIOLOGY | Facility: HOSPITAL | Age: 64
Discharge: HOME | End: 2019-11-21
Payer: COMMERCIAL

## 2019-11-21 DIAGNOSIS — R30.0 BURNING WITH URINATION: ICD-10-CM

## 2019-11-21 DIAGNOSIS — N20.0 KIDNEY STONES: ICD-10-CM

## 2019-11-21 LAB
BACTERIA UR QL AUTO: ABNORMAL /HPF
BILIRUB UR QL STRIP: NEGATIVE
CLARITY UR: CLEAR
COLOR UR: YELLOW
GLUCOSE UR STRIP-MCNC: NEGATIVE MG/DL
HGB UR QL STRIP.AUTO: NEGATIVE
KETONES UR STRIP-MCNC: NEGATIVE MG/DL
LEUKOCYTE ESTERASE UR QL STRIP: NEGATIVE
NITRITE UR QL STRIP: NEGATIVE
NON-SQ EPI CELLS URNS QL MICRO: ABNORMAL /HPF
PH UR STRIP.AUTO: 6 [PH]
PROT UR STRIP-MCNC: NEGATIVE MG/DL
RBC #/AREA URNS AUTO: ABNORMAL /HPF
SP GR UR STRIP.AUTO: <=1.005 (ref 1–1.03)
UROBILINOGEN UR QL STRIP.AUTO: 0.2 E.U./DL
WBC #/AREA URNS AUTO: ABNORMAL /HPF

## 2019-11-21 PROCEDURE — 74018 RADEX ABDOMEN 1 VIEW: CPT

## 2019-11-21 PROCEDURE — 87086 URINE CULTURE/COLONY COUNT: CPT

## 2019-11-21 PROCEDURE — 81001 URINALYSIS AUTO W/SCOPE: CPT

## 2019-11-22 LAB — BACTERIA UR CULT: NORMAL
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
## 2019-11-22 NOTE — TELEPHONE ENCOUNTER
Spoke with patient  Advised patient of negative urine culture results  Patient verbalized understanding

## 2019-11-29 ENCOUNTER — OFFICE VISIT (OUTPATIENT)
Dept: UROLOGY | Facility: CLINIC | Age: 64
End: 2019-11-29
Payer: COMMERCIAL

## 2019-11-29 VITALS
DIASTOLIC BLOOD PRESSURE: 74 MMHG | HEIGHT: 62 IN | SYSTOLIC BLOOD PRESSURE: 142 MMHG | WEIGHT: 176.8 LBS | HEART RATE: 72 BPM | BODY MASS INDEX: 32.54 KG/M2

## 2019-11-29 DIAGNOSIS — N20.0 CALCULUS OF KIDNEY: Primary | ICD-10-CM

## 2019-11-29 PROCEDURE — 99213 OFFICE O/P EST LOW 20 MIN: CPT | Performed by: PHYSICIAN ASSISTANT

## 2019-11-29 NOTE — PROGRESS NOTES
UROLOGY PROGRESS NOTE   Patient Identifiers: Nicholas Todd (MRN [id number])  Date of Service: 11/29/2019    Subjective:     66-year-old female with history kidney stones  She had a ureteroscopy September 24th for large left renal stone  I took her stent out October 30th and she is here for follow-up  She has only a tiny residual 2 mm calculi in left kidney  Otherwise her stones have cleared  There is no stone analysis  although I expect calcium oxalate  Patient   Kidney stone follow-up    Objective:     VITALS:    Vitals:    11/29/19 1258   BP: 142/74   Pulse: 72           LABS:  Lab Results   Component Value Date    HGB 15 1 08/29/2019    HCT 46 8 (H) 08/29/2019    WBC 5 1 08/29/2019     08/29/2019   ]    Lab Results   Component Value Date     01/11/2018    K 4 2 08/29/2019     08/29/2019    CO2 32 08/29/2019    BUN 18 08/29/2019    CREATININE 0 92 08/29/2019    CALCIUM 9 6 08/29/2019    GLUCOSE 101 02/12/2014   ]        INPATIENT MEDS:    Current Outpatient Medications:     acetaminophen (TYLENOL) 500 mg tablet, Take 500 mg by mouth 3 (three) times a day as needed for mild pain, Disp: , Rfl:     amLODIPine (NORVASC) 10 mg tablet, TAKE 1 TABLET BY MOUTH DAILY (Patient taking differently: TAKE 1 TABLET BY MOUTH DAILY with dinner), Disp: 90 tablet, Rfl: 3    Cholecalciferol (VITAMIN D3) 1000 units CAPS, Take by mouth, Disp: , Rfl:     famotidine (PEPCID) 10 mg tablet, Take 10 mg by mouth daily as needed , Disp: , Rfl:     fluticasone (FLONASE) 50 mcg/act nasal spray, 2 sprays into each nostril daily as needed , Disp: , Rfl:     Lactobacillus (PROBIOTIC ACIDOPHILUS) CAPS, Take 1 tablet daily as supplement  , Disp: , Rfl:     lisinopril (ZESTRIL) 40 mg tablet, Take 1 tablet (40 mg total) by mouth daily in the early morning, Disp: 90 tablet, Rfl: 3    loratadine (CLARITIN) 10 mg tablet, Take 1 tablet by mouth daily as needed, Disp: , Rfl:     metoprolol tartrate (LOPRESSOR) 25 mg tablet, TAKE 1 TABLET (25 MG TOTAL) BY MOUTH EVERY 12 (TWELVE) HOURS, Disp: 180 tablet, Rfl: 1    repaglinide (PRANDIN) 0 5 mg tablet, Take 1 tablet (0 5 mg total) by mouth daily (Patient taking differently: Take 0 5 mg by mouth daily with dinner ), Disp: 90 tablet, Rfl: 3      Physical Exam:   /74 (BP Location: Left arm, Patient Position: Sitting, Cuff Size: Standard)   Pulse 72   Ht 5' 1 5" (1 562 m)   Wt 80 2 kg (176 lb 12 8 oz)   BMI 32 87 kg/m²   GEN: no acute distress    RESP: breathing comfortably with no accessory muscle use    ABD: soft, non-tender, non-distended   INCISION:    EXT: no significant peripheral edema       RADIOLOGY:   ABDOMEN   IMPRESSION:     Small left renal calculi      Assessment:    1   Kidney stones     Plan:   - we discussed strategies of stone prevention   -  She may follow up on an as-needed basis  - and call with any questions or concerns  -

## 2020-01-04 LAB
BUN SERPL-MCNC: 14 MG/DL (ref 7–25)
BUN/CREAT SERPL: ABNORMAL (CALC) (ref 6–22)
CALCIUM SERPL-MCNC: 9.7 MG/DL (ref 8.6–10.4)
CHLORIDE SERPL-SCNC: 101 MMOL/L (ref 98–110)
CHOLEST SERPL-MCNC: 230 MG/DL
CHOLEST/HDLC SERPL: 3.7 (CALC)
CO2 SERPL-SCNC: 31 MMOL/L (ref 20–32)
CREAT SERPL-MCNC: 0.87 MG/DL (ref 0.5–0.99)
GLUCOSE SERPL-MCNC: 115 MG/DL (ref 65–99)
HBA1C MFR BLD: 5.9 % OF TOTAL HGB
HDLC SERPL-MCNC: 62 MG/DL
LDLC SERPL CALC-MCNC: 142 MG/DL (CALC)
NONHDLC SERPL-MCNC: 168 MG/DL (CALC)
POTASSIUM SERPL-SCNC: 4.1 MMOL/L (ref 3.5–5.3)
SL AMB EGFR AFRICAN AMERICAN: 80 ML/MIN/1.73M2
SL AMB EGFR NON AFRICAN AMERICAN: 69 ML/MIN/1.73M2
SODIUM SERPL-SCNC: 140 MMOL/L (ref 135–146)
TRIGL SERPL-MCNC: 135 MG/DL
TSH SERPL-ACNC: 1.24 MIU/L (ref 0.4–4.5)

## 2020-01-07 ENCOUNTER — OFFICE VISIT (OUTPATIENT)
Dept: FAMILY MEDICINE CLINIC | Facility: CLINIC | Age: 65
End: 2020-01-07
Payer: COMMERCIAL

## 2020-01-07 VITALS
HEART RATE: 75 BPM | DIASTOLIC BLOOD PRESSURE: 80 MMHG | BODY MASS INDEX: 32.68 KG/M2 | TEMPERATURE: 98.3 F | HEIGHT: 62 IN | SYSTOLIC BLOOD PRESSURE: 138 MMHG | RESPIRATION RATE: 16 BRPM | OXYGEN SATURATION: 97 % | WEIGHT: 177.6 LBS

## 2020-01-07 DIAGNOSIS — E04.2 MULTIPLE THYROID NODULES: ICD-10-CM

## 2020-01-07 DIAGNOSIS — I10 HYPERTENSION, UNSPECIFIED TYPE: Primary | ICD-10-CM

## 2020-01-07 DIAGNOSIS — G47.33 OBSTRUCTIVE SLEEP APNEA: ICD-10-CM

## 2020-01-07 DIAGNOSIS — Z00.00 ROUTINE HEALTH MAINTENANCE: ICD-10-CM

## 2020-01-07 DIAGNOSIS — E11.9 TYPE 2 DIABETES MELLITUS WITHOUT COMPLICATION, WITHOUT LONG-TERM CURRENT USE OF INSULIN (HCC): ICD-10-CM

## 2020-01-07 DIAGNOSIS — E78.2 MIXED HYPERLIPIDEMIA: ICD-10-CM

## 2020-01-07 DIAGNOSIS — R91.1 PULMONARY NODULE: ICD-10-CM

## 2020-01-07 PROCEDURE — 1160F RVW MEDS BY RX/DR IN RCRD: CPT | Performed by: FAMILY MEDICINE

## 2020-01-07 PROCEDURE — 99214 OFFICE O/P EST MOD 30 MIN: CPT | Performed by: FAMILY MEDICINE

## 2020-01-07 PROCEDURE — 3079F DIAST BP 80-89 MM HG: CPT | Performed by: FAMILY MEDICINE

## 2020-01-07 PROCEDURE — 3075F SYST BP GE 130 - 139MM HG: CPT | Performed by: FAMILY MEDICINE

## 2020-01-07 PROCEDURE — 3008F BODY MASS INDEX DOCD: CPT | Performed by: FAMILY MEDICINE
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
## 2020-01-10 ENCOUNTER — HOSPITAL ENCOUNTER (OUTPATIENT)
Dept: ULTRASOUND IMAGING | Facility: CLINIC | Age: 65
Discharge: HOME | End: 2020-01-10
Payer: COMMERCIAL

## 2020-01-10 ENCOUNTER — HOSPITAL ENCOUNTER (OUTPATIENT)
Dept: MAMMOGRAPHY | Facility: CLINIC | Age: 65
Discharge: HOME | End: 2020-01-10
Payer: COMMERCIAL

## 2020-01-10 DIAGNOSIS — I10 BENIGN ESSENTIAL HYPERTENSION: ICD-10-CM

## 2020-01-10 DIAGNOSIS — R92.8 ABNORMAL FINDINGS ON DIAGNOSTIC IMAGING OF BREAST: ICD-10-CM

## 2020-01-10 PROCEDURE — 77065 DX MAMMO INCL CAD UNI: CPT

## 2020-01-10 PROCEDURE — G0279 TOMOSYNTHESIS, MAMMO: HCPCS

## 2020-01-10 RX ORDER — AMLODIPINE BESYLATE 10 MG/1
10 TABLET ORAL
Qty: 90 TABLET | Refills: 3 | Status: SHIPPED | OUTPATIENT
Start: 2020-01-10 | End: 2021-02-22

## 2020-01-15 DIAGNOSIS — I10 ESSENTIAL HYPERTENSION: ICD-10-CM

## 2020-04-15 LAB
ALBUMIN SERPL-MCNC: 4.3 G/DL (ref 3.6–5.1)
ALBUMIN/GLOB SERPL: 1.7 (CALC) (ref 1–2.5)
ALP SERPL-CCNC: 83 U/L (ref 37–153)
ALT SERPL-CCNC: 17 U/L (ref 6–29)
AST SERPL-CCNC: 15 U/L (ref 10–35)
BILIRUB SERPL-MCNC: 0.4 MG/DL (ref 0.2–1.2)
BUN SERPL-MCNC: 19 MG/DL (ref 7–25)
BUN/CREAT SERPL: ABNORMAL (CALC) (ref 6–22)
CALCIUM SERPL-MCNC: 9.4 MG/DL (ref 8.6–10.4)
CHLORIDE SERPL-SCNC: 105 MMOL/L (ref 98–110)
CHOLEST SERPL-MCNC: 207 MG/DL
CHOLEST/HDLC SERPL: 3.4 (CALC)
CO2 SERPL-SCNC: 29 MMOL/L (ref 20–32)
CREAT SERPL-MCNC: 0.97 MG/DL (ref 0.5–0.99)
GLOBULIN SER CALC-MCNC: 2.5 G/DL (CALC) (ref 1.9–3.7)
GLUCOSE SERPL-MCNC: 120 MG/DL (ref 65–99)
HBA1C MFR BLD: 6.2 % OF TOTAL HGB
HDLC SERPL-MCNC: 61 MG/DL
LDLC SERPL CALC-MCNC: 124 MG/DL (CALC)
NONHDLC SERPL-MCNC: 146 MG/DL (CALC)
POTASSIUM SERPL-SCNC: 4.4 MMOL/L (ref 3.5–5.3)
PROT SERPL-MCNC: 6.8 G/DL (ref 6.1–8.1)
SL AMB EGFR AFRICAN AMERICAN: 71 ML/MIN/1.73M2
SL AMB EGFR NON AFRICAN AMERICAN: 61 ML/MIN/1.73M2
SODIUM SERPL-SCNC: 142 MMOL/L (ref 135–146)
TRIGL SERPL-MCNC: 110 MG/DL

## 2020-04-15 PROCEDURE — 3044F HG A1C LEVEL LT 7.0%: CPT | Performed by: NURSE PRACTITIONER

## 2020-04-16 ENCOUNTER — TELEPHONE (OUTPATIENT)
Dept: FAMILY MEDICINE CLINIC | Facility: CLINIC | Age: 65
End: 2020-04-16

## 2020-04-21 ENCOUNTER — OFFICE VISIT (OUTPATIENT)
Dept: FAMILY MEDICINE CLINIC | Facility: CLINIC | Age: 65
End: 2020-04-21
Payer: COMMERCIAL

## 2020-04-21 VITALS
HEIGHT: 62 IN | DIASTOLIC BLOOD PRESSURE: 78 MMHG | SYSTOLIC BLOOD PRESSURE: 142 MMHG | WEIGHT: 179 LBS | TEMPERATURE: 98.5 F | BODY MASS INDEX: 32.94 KG/M2 | RESPIRATION RATE: 16 BRPM | HEART RATE: 64 BPM | OXYGEN SATURATION: 98 %

## 2020-04-21 DIAGNOSIS — E11.9 TYPE 2 DIABETES MELLITUS WITHOUT COMPLICATION, WITHOUT LONG-TERM CURRENT USE OF INSULIN (HCC): ICD-10-CM

## 2020-04-21 DIAGNOSIS — R53.83 FATIGUE, UNSPECIFIED TYPE: ICD-10-CM

## 2020-04-21 DIAGNOSIS — M54.2 NECK PAIN: ICD-10-CM

## 2020-04-21 DIAGNOSIS — Z00.00 ROUTINE HEALTH MAINTENANCE: ICD-10-CM

## 2020-04-21 DIAGNOSIS — E04.2 MULTIPLE THYROID NODULES: ICD-10-CM

## 2020-04-21 DIAGNOSIS — I10 HYPERTENSION, UNSPECIFIED TYPE: Primary | ICD-10-CM

## 2020-04-21 DIAGNOSIS — R91.1 PULMONARY NODULE: ICD-10-CM

## 2020-04-21 DIAGNOSIS — G47.33 OBSTRUCTIVE SLEEP APNEA: ICD-10-CM

## 2020-04-21 DIAGNOSIS — E78.2 MIXED HYPERLIPIDEMIA: ICD-10-CM

## 2020-04-21 DIAGNOSIS — Z12.11 SCREENING FOR COLON CANCER: ICD-10-CM

## 2020-04-21 PROCEDURE — 1036F TOBACCO NON-USER: CPT | Performed by: FAMILY MEDICINE

## 2020-04-21 PROCEDURE — 3077F SYST BP >= 140 MM HG: CPT | Performed by: FAMILY MEDICINE

## 2020-04-21 PROCEDURE — 3078F DIAST BP <80 MM HG: CPT | Performed by: FAMILY MEDICINE

## 2020-04-21 PROCEDURE — 4040F PNEUMOC VAC/ADMIN/RCVD: CPT | Performed by: FAMILY MEDICINE

## 2020-04-21 PROCEDURE — 99214 OFFICE O/P EST MOD 30 MIN: CPT | Performed by: FAMILY MEDICINE

## 2020-04-21 PROCEDURE — 2022F DILAT RTA XM EVC RTNOPTHY: CPT | Performed by: FAMILY MEDICINE

## 2020-04-21 PROCEDURE — 1160F RVW MEDS BY RX/DR IN RCRD: CPT | Performed by: FAMILY MEDICINE

## 2020-05-17 DIAGNOSIS — E11.9 TYPE 2 DIABETES MELLITUS WITHOUT COMPLICATION, WITHOUT LONG-TERM CURRENT USE OF INSULIN (HCC): ICD-10-CM

## 2020-05-18 RX ORDER — REPAGLINIDE 0.5 MG/1
0.5 TABLET ORAL
Qty: 90 TABLET | Refills: 3 | Status: SHIPPED | OUTPATIENT
Start: 2020-05-18 | End: 2021-02-22

## 2020-05-19 ENCOUNTER — TELEPHONE (OUTPATIENT)
Dept: FAMILY MEDICINE CLINIC | Facility: CLINIC | Age: 65
End: 2020-05-19

## 2020-05-19 DIAGNOSIS — M54.2 NECK PAIN: Primary | ICD-10-CM

## 2020-05-21 ENCOUNTER — HOSPITAL ENCOUNTER (OUTPATIENT)
Dept: RADIOLOGY | Facility: HOSPITAL | Age: 65
Discharge: HOME | End: 2020-05-21
Payer: COMMERCIAL

## 2020-05-21 DIAGNOSIS — M54.2 NECK PAIN: ICD-10-CM

## 2020-05-21 PROCEDURE — 72050 X-RAY EXAM NECK SPINE 4/5VWS: CPT

## 2020-06-02 ENCOUNTER — TELEMEDICINE (OUTPATIENT)
Dept: PAIN MEDICINE | Facility: CLINIC | Age: 65
End: 2020-06-02
Payer: COMMERCIAL

## 2020-06-02 DIAGNOSIS — M54.81 BILATERAL OCCIPITAL NEURALGIA: ICD-10-CM

## 2020-06-02 DIAGNOSIS — M47.812 CERVICAL SPONDYLOSIS: ICD-10-CM

## 2020-06-02 DIAGNOSIS — M54.2 NECK PAIN: Primary | ICD-10-CM

## 2020-06-02 PROCEDURE — 99443 PR PHYS/QHP TELEPHONE EVALUATION 21-30 MIN: CPT | Performed by: ANESTHESIOLOGY

## 2020-06-02 RX ORDER — METHYLPREDNISOLONE 4 MG/1
TABLET ORAL
Qty: 21 TABLET | Refills: 0 | Status: SHIPPED | OUTPATIENT
Start: 2020-06-02 | End: 2020-07-13

## 2020-07-09 DIAGNOSIS — I10 ESSENTIAL HYPERTENSION: ICD-10-CM

## 2020-07-13 ENCOUNTER — OFFICE VISIT (OUTPATIENT)
Dept: PAIN MEDICINE | Facility: CLINIC | Age: 65
End: 2020-07-13
Payer: COMMERCIAL

## 2020-07-13 VITALS
HEIGHT: 62 IN | WEIGHT: 178 LBS | BODY MASS INDEX: 32.76 KG/M2 | SYSTOLIC BLOOD PRESSURE: 132 MMHG | DIASTOLIC BLOOD PRESSURE: 82 MMHG | HEART RATE: 72 BPM

## 2020-07-13 DIAGNOSIS — M47.812 CERVICAL SPONDYLOSIS: ICD-10-CM

## 2020-07-13 DIAGNOSIS — M54.2 NECK PAIN: Primary | ICD-10-CM

## 2020-07-13 DIAGNOSIS — M79.18 CERVICAL MYOFASCIAL PAIN SYNDROME: ICD-10-CM

## 2020-07-13 PROCEDURE — 2022F DILAT RTA XM EVC RTNOPTHY: CPT | Performed by: NURSE PRACTITIONER

## 2020-07-13 PROCEDURE — 1160F RVW MEDS BY RX/DR IN RCRD: CPT | Performed by: NURSE PRACTITIONER

## 2020-07-13 PROCEDURE — 99213 OFFICE O/P EST LOW 20 MIN: CPT | Performed by: NURSE PRACTITIONER

## 2020-07-13 PROCEDURE — 3079F DIAST BP 80-89 MM HG: CPT | Performed by: NURSE PRACTITIONER

## 2020-07-13 PROCEDURE — 3008F BODY MASS INDEX DOCD: CPT | Performed by: NURSE PRACTITIONER

## 2020-07-13 PROCEDURE — 4040F PNEUMOC VAC/ADMIN/RCVD: CPT | Performed by: NURSE PRACTITIONER

## 2020-07-13 PROCEDURE — 3075F SYST BP GE 130 - 139MM HG: CPT | Performed by: NURSE PRACTITIONER

## 2020-07-13 PROCEDURE — 1036F TOBACCO NON-USER: CPT | Performed by: NURSE PRACTITIONER
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
## 2020-07-24 LAB
LEFT EYE DIABETIC RETINOPATHY: NORMAL
RIGHT EYE DIABETIC RETINOPATHY: NORMAL

## 2020-08-10 ENCOUNTER — TRANSCRIBE ORDERS (OUTPATIENT)
Dept: ADMINISTRATIVE | Facility: HOSPITAL | Age: 65
End: 2020-08-10

## 2020-08-10 ENCOUNTER — TELEPHONE (OUTPATIENT)
Dept: GASTROENTEROLOGY | Facility: AMBULARY SURGERY CENTER | Age: 65
End: 2020-08-10

## 2020-08-10 DIAGNOSIS — Z12.31 VISIT FOR SCREENING MAMMOGRAM: Primary | ICD-10-CM

## 2020-08-10 DIAGNOSIS — Z12.11 SCREEN FOR COLON CANCER: Primary | ICD-10-CM

## 2020-09-08 ENCOUNTER — OFFICE VISIT (OUTPATIENT)
Dept: FAMILY MEDICINE CLINIC | Facility: CLINIC | Age: 65
End: 2020-09-08
Payer: COMMERCIAL

## 2020-09-08 VITALS
BODY MASS INDEX: 33.05 KG/M2 | OXYGEN SATURATION: 97 % | HEIGHT: 62 IN | RESPIRATION RATE: 16 BRPM | WEIGHT: 179.6 LBS | HEART RATE: 86 BPM | DIASTOLIC BLOOD PRESSURE: 70 MMHG | SYSTOLIC BLOOD PRESSURE: 142 MMHG | TEMPERATURE: 99.1 F

## 2020-09-08 DIAGNOSIS — Z86.010 HISTORY OF COLON POLYPS: ICD-10-CM

## 2020-09-08 DIAGNOSIS — G47.33 OBSTRUCTIVE SLEEP APNEA: ICD-10-CM

## 2020-09-08 DIAGNOSIS — H81.10 BENIGN PAROXYSMAL VERTIGO, UNSPECIFIED LATERALITY: Primary | ICD-10-CM

## 2020-09-08 DIAGNOSIS — R19.5 LOOSE STOOLS: ICD-10-CM

## 2020-09-08 DIAGNOSIS — L02.92 FURUNCLE: ICD-10-CM

## 2020-09-08 DIAGNOSIS — Z23 INFLUENZA VACCINE NEEDED: ICD-10-CM

## 2020-09-08 DIAGNOSIS — Z00.00 MEDICARE ANNUAL WELLNESS VISIT, SUBSEQUENT: ICD-10-CM

## 2020-09-08 PROCEDURE — 90662 IIV NO PRSV INCREASED AG IM: CPT | Performed by: NURSE PRACTITIONER

## 2020-09-08 PROCEDURE — 3725F SCREEN DEPRESSION PERFORMED: CPT | Performed by: NURSE PRACTITIONER

## 2020-09-08 PROCEDURE — 1170F FXNL STATUS ASSESSED: CPT | Performed by: NURSE PRACTITIONER

## 2020-09-08 PROCEDURE — 99214 OFFICE O/P EST MOD 30 MIN: CPT | Performed by: NURSE PRACTITIONER

## 2020-09-08 PROCEDURE — 1125F AMNT PAIN NOTED PAIN PRSNT: CPT | Performed by: NURSE PRACTITIONER

## 2020-09-08 PROCEDURE — 3077F SYST BP >= 140 MM HG: CPT | Performed by: NURSE PRACTITIONER

## 2020-09-08 PROCEDURE — G0439 PPPS, SUBSEQ VISIT: HCPCS | Performed by: NURSE PRACTITIONER

## 2020-09-08 PROCEDURE — 3078F DIAST BP <80 MM HG: CPT | Performed by: NURSE PRACTITIONER

## 2020-09-08 PROCEDURE — G0008 ADMIN INFLUENZA VIRUS VAC: HCPCS | Performed by: NURSE PRACTITIONER

## 2020-09-08 NOTE — PROGRESS NOTES
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      Joint stiffness    Sulfamethoxazole-Trimethoprim Other (See Comments)     Joint pain       Current Medications     Current Outpatient Medications   Medication Sig Dispense Refill    amLODIPine (NORVASC) 10 mg tablet Take 1 tablet (10 mg total) by mouth daily with dinner 90 tablet 3    Cholecalciferol (VITAMIN D3) 1000 units CAPS Take by mouth      famotidine (PEPCID) 10 mg tablet Take 10 mg by mouth daily as needed       Flaxseed Oil OIL by Does not apply route      fluticasone (FLONASE) 50 mcg/act nasal spray 2 sprays into each nostril daily as needed       Lactobacillus (PROBIOTIC ACIDOPHILUS) CAPS Take 1 tablet daily as supplement        lisinopril (ZESTRIL) 40 mg tablet Take 1 tablet (40 mg total) by mouth daily in the early morning 90 tablet 3    loratadine (CLARITIN) 10 mg tablet Take 1 tablet by mouth daily as needed      metoprolol tartrate (LOPRESSOR) 25 mg tablet TAKE 1 TABLET (25 MG TOTAL) BY MOUTH EVERY 12 (TWELVE) HOURS 180 tablet 0    repaglinide (PRANDIN) 0 5 mg tablet Take 1 tablet (0 5 mg total) by mouth daily with dinner 90 tablet 3     No current facility-administered medications for this visit            Health Maintenance     Health Maintenance   Topic Date Due    BMI: Followup Plan  10/26/1971    Medicare Annual Wellness Visit (AWV)  03/26/2020    Colonoscopy Surveillance  09/15/2020    HEMOGLOBIN A1C  10/14/2020    Diabetic Foot Exam  01/07/2021    Pneumococcal Vaccine: 65+ Years (2 of 2 - PPSV23) 07/12/2021    Fall Risk  09/08/2021    Depression Screening PHQ  09/08/2021    BMI: Adult  09/08/2021    MAMMOGRAM  01/10/2022    DM Eye Exam  07/24/2022    Colorectal Cancer Screening  09/15/2025    DTaP,Tdap,and Td Vaccines (2 - Td) 07/17/2028    Hepatitis C Screening  Completed    Influenza Vaccine  Completed    HIB Vaccine  Aged Out    Hepatitis B Vaccine  Aged Out    IPV Vaccine  Aged Out    Hepatitis A Vaccine  Aged Out    Meningococcal ACWY Vaccine  Aged Out    HPV Vaccine  Aged Out     Immunization History   Administered Date(s) Administered    INFLUENZA 01/05/2016, 10/18/2018    Influenza Quadrivalent Preservative Free 3 years and older IM 10/04/2017    Influenza Split High Dose Preservative Free IM 10/11/2019    Influenza TIV (IM) 09/01/2014, 01/05/2016, 10/26/2016    Influenza, high dose seasonal 0 7 mL 09/08/2020    Pneumococcal Conjugate 13-Valent 12/18/2018    Pneumococcal Polysaccharide PPV23 07/12/2016    Tdap 07/17/2018    Zoster 01/01/2014, 08/30/2016       JOSHUA Ku
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
## 2020-09-08 NOTE — PATIENT INSTRUCTIONS
Medicare Preventive Visit Patient Instructions  Thank you for completing your Welcome to Medicare Visit or Medicare Annual Wellness Visit today  Your next wellness visit will be due in one year (9/8/2021)  The screening/preventive services that you may require over the next 5-10 years are detailed below  Some tests may not apply to you based off risk factors and/or age  Screening tests ordered at today's visit but not completed yet may show as past due  Also, please note that scanned in results may not display below  Preventive Screenings:  Service Recommendations Previous Testing/Comments   Colorectal Cancer Screening  * Colonoscopy    * Fecal Occult Blood Test (FOBT)/Fecal Immunochemical Test (FIT)  * Fecal DNA/Cologuard Test  * Flexible Sigmoidoscopy Age: 54-65 years old   Colonoscopy: every 10 years (may be performed more frequently if at higher risk)  OR  FOBT/FIT: every 1 year  OR  Cologuard: every 3 years  OR  Sigmoidoscopy: every 5 years  Screening may be recommended earlier than age 48 if at higher risk for colorectal cancer  Also, an individualized decision between you and your healthcare provider will decide whether screening between the ages of 74-80 would be appropriate  Colonoscopy: 09/15/2015  FOBT/FIT: Not on file  Cologuard: Not on file  Sigmoidoscopy: Not on file    Screening Current     Breast Cancer Screening Age: 36 years old  Frequency: every 1-2 years  Not required if history of left and right mastectomy Mammogram: 01/10/2020    Screening Current   Cervical Cancer Screening Between the ages of 21-29, pap smear recommended once every 3 years  Between the ages of 33-67, can perform pap smear with HPV co-testing every 5 years     Recommendations may differ for women with a history of total hysterectomy, cervical cancer, or abnormal pap smears in past  Pap Smear: 10/10/2019    Screening Not Indicated   Hepatitis C Screening Once for adults born between 1945 and 1965  More frequently in patients at high risk for Hepatitis C Hep C Antibody: 07/17/2017    Screening Current   Diabetes Screening 1-2 times per year if you're at risk for diabetes or have pre-diabetes Fasting glucose: No results in last 5 years   A1C: 6 2 % of total Hgb    Screening Not Indicated  History Diabetes   Cholesterol Screening Once every 5 years if you don't have a lipid disorder  May order more often based on risk factors  Lipid panel: 04/14/2020    Screening Not Indicated  History Lipid Disorder     Other Preventive Screenings Covered by Medicare:  1  Abdominal Aortic Aneurysm (AAA) Screening: covered once if your at risk  You're considered to be at risk if you have a family history of AAA  2  Lung Cancer Screening: covers low dose CT scan once per year if you meet all of the following conditions: (1) Age 50-69; (2) No signs or symptoms of lung cancer; (3) Current smoker or have quit smoking within the last 15 years; (4) You have a tobacco smoking history of at least 30 pack years (packs per day multiplied by number of years you smoked); (5) You get a written order from a healthcare provider  3  Glaucoma Screening: covered annually if you're considered high risk: (1) You have diabetes OR (2) Family history of glaucoma OR (3)  aged 48 and older OR (3)  American aged 72 and older  3  Osteoporosis Screening: covered every 2 years if you meet one of the following conditions: (1) You're estrogen deficient and at risk for osteoporosis based off medical history and other findings; (2) Have a vertebral abnormality; (3) On glucocorticoid therapy for more than 3 months; (4) Have primary hyperparathyroidism; (5) On osteoporosis medications and need to assess response to drug therapy  · Last bone density test (DXA Scan): 01/08/2019   5  HIV Screening: covered annually if you're between the age of 15-65  Also covered annually if you are younger than 13 and older than 72 with risk factors for HIV infection   For pregnant patients, it is covered up to 3 times per pregnancy  Immunizations:  Immunization Recommendations   Influenza Vaccine Annual influenza vaccination during flu season is recommended for all persons aged >= 6 months who do not have contraindications   Pneumococcal Vaccine (Prevnar and Pneumovax)  * Prevnar = PCV13  * Pneumovax = PPSV23   Adults 25-60 years old: 1-3 doses may be recommended based on certain risk factors  Adults 72 years old: Prevnar (PCV13) vaccine recommended followed by Pneumovax (PPSV23) vaccine  If already received PPSV23 since turning 65, then PCV13 recommended at least one year after PPSV23 dose  Hepatitis B Vaccine 3 dose series if at intermediate or high risk (ex: diabetes, end stage renal disease, liver disease)   Tetanus (Td) Vaccine - COST NOT COVERED BY MEDICARE PART B Following completion of primary series, a booster dose should be given every 10 years to maintain immunity against tetanus  Td may also be given as tetanus wound prophylaxis  Tdap Vaccine - COST NOT COVERED BY MEDICARE PART B Recommended at least once for all adults  For pregnant patients, recommended with each pregnancy  Shingles Vaccine (Shingrix) - COST NOT COVERED BY MEDICARE PART B  2 shot series recommended in those aged 48 and above     Health Maintenance Due:      Topic Date Due    MAMMOGRAM  01/10/2022    Hepatitis C Screening  Completed     Immunizations Due:      Topic Date Due    Influenza Vaccine  07/01/2020     Advance Directives   What are advance directives? Advance directives are legal documents that state your wishes and plans for medical care  These plans are made ahead of time in case you lose your ability to make decisions for yourself  Advance directives can apply to any medical decision, such as the treatments you want, and if you want to donate organs  What are the types of advance directives?   There are many types of advance directives, and each state has rules about how to use them  You may choose a combination of any of the following:  · Living will: This is a written record of the treatment you want  You can also choose which treatments you do not want, which to limit, and which to stop at a certain time  This includes surgery, medicine, IV fluid, and tube feedings  · Durable power of  for healthcare [facility]): This is a written record that states who you want to make healthcare choices for you when you are unable to make them for yourself  This person, called a proxy, is usually a family member or a friend  You may choose more than 1 proxy  · Do not resuscitate (DNR) order:  A DNR order is used in case your heart stops beating or you stop breathing  It is a request not to have certain forms of treatment, such as CPR  A DNR order may be included in other types of advance directives  · Medical directive: This covers the care that you want if you are in a coma, near death, or unable to make decisions for yourself  You can list the treatments you want for each condition  Treatment may include pain medicine, surgery, blood transfusions, dialysis, IV or tube feedings, and a ventilator (breathing machine)  · Values history: This document has questions about your views, beliefs, and how you feel and think about life  This information can help others choose the care that you would choose  Why are advance directives important? An advance directive helps you control your care  Although spoken wishes may be used, it is better to have your wishes written down  Spoken wishes can be misunderstood, or not followed  Treatments may be given even if you do not want them  An advance directive may make it easier for your family to make difficult choices about your care  Urinary Incontinence   Urinary incontinence (UI)  is when you lose control of your bladder  UI develops because your bladder cannot store or empty urine properly   The 3 most common types of UI are stress incontinence, urge incontinence, or both  Medicines:   · May be given to help strengthen your bladder control  Report any side effects of medication to your healthcare provider  Do pelvic muscle exercises often:  Your pelvic muscles help you stop urinating  Squeeze these muscles tight for 5 seconds, then relax for 5 seconds  Gradually work up to squeezing for 10 seconds  Do 3 sets of 15 repetitions a day, or as directed  This will help strengthen your pelvic muscles and improve bladder control  Train your bladder:  Go to the bathroom at set times, such as every 2 hours, even if you do not feel the urge to go  You can also try to hold your urine when you feel the urge to go  For example, hold your urine for 5 minutes when you feel the urge to go  As that becomes easier, hold your urine for 10 minutes  Self-care:   · Keep a UI record  Write down how often you leak urine and how much you leak  Make a note of what you were doing when you leaked urine  · Drink liquids as directed  You may need to limit the amount of liquid you drink to help control your urine leakage  Do not drink any liquid right before you go to bed  Limit or do not have drinks that contain caffeine or alcohol  · Prevent constipation  Eat a variety of high-fiber foods  Good examples are high-fiber cereals, beans, vegetables, and whole-grain breads  Walking is the best way to trigger your intestines to have a bowel movement  · Exercise regularly and maintain a healthy weight  Weight loss and exercise will decrease pressure on your bladder and help you control your leakage  · Use a catheter as directed  to help empty your bladder  A catheter is a tiny, plastic tube that is put into your bladder to drain your urine  · Go to behavior therapy as directed  Behavior therapy may be used to help you learn to control your urge to urinate      Weight Management   Why it is important to manage your weight:  Being overweight increases your risk of health conditions such as heart disease, high blood pressure, type 2 diabetes, and certain types of cancer  It can also increase your risk for osteoarthritis, sleep apnea, and other respiratory problems  Aim for a slow, steady weight loss  Even a small amount of weight loss can lower your risk of health problems  How to lose weight safely:  A safe and healthy way to lose weight is to eat fewer calories and get regular exercise  You can lose up about 1 pound a week by decreasing the number of calories you eat by 500 calories each day  Healthy meal plan for weight management:  A healthy meal plan includes a variety of foods, contains fewer calories, and helps you stay healthy  A healthy meal plan includes the following:  · Eat whole-grain foods more often  A healthy meal plan should contain fiber  Fiber is the part of grains, fruits, and vegetables that is not broken down by your body  Whole-grain foods are healthy and provide extra fiber in your diet  Some examples of whole-grain foods are whole-wheat breads and pastas, oatmeal, brown rice, and bulgur  · Eat a variety of vegetables every day  Include dark, leafy greens such as spinach, kale, risa greens, and mustard greens  Eat yellow and orange vegetables such as carrots, sweet potatoes, and winter squash  · Eat a variety of fruits every day  Choose fresh or canned fruit (canned in its own juice or light syrup) instead of juice  Fruit juice has very little or no fiber  · Eat low-fat dairy foods  Drink fat-free (skim) milk or 1% milk  Eat fat-free yogurt and low-fat cottage cheese  Try low-fat cheeses such as mozzarella and other reduced-fat cheeses  · Choose meat and other protein foods that are low in fat  Choose beans or other legumes such as split peas or lentils  Choose fish, skinless poultry (chicken or turkey), or lean cuts of red meat (beef or pork)  Before you cook meat or poultry, cut off any visible fat  · Use less fat and oil    Try baking foods instead of frying them  Add less fat, such as margarine, sour cream, regular salad dressing and mayonnaise to foods  Eat fewer high-fat foods  Some examples of high-fat foods include french fries, doughnuts, ice cream, and cakes  · Eat fewer sweets  Limit foods and drinks that are high in sugar  This includes candy, cookies, regular soda, and sweetened drinks  Exercise:  Exercise at least 30 minutes per day on most days of the week  Some examples of exercise include walking, biking, dancing, and swimming  You can also fit in more physical activity by taking the stairs instead of the elevator or parking farther away from stores  Ask your healthcare provider about the best exercise plan for you  © Copyright [employer] 2018 Information is for End User's use only and may not be sold, redistributed or otherwise used for commercial purposes   All illustrations and images included in CareNotes® are the copyrighted property of [employer]  or [address] [place] [address]

## 2020-09-14 RX ORDER — SODIUM, POTASSIUM,MAG SULFATES 17.5-3.13G
SOLUTION, RECONSTITUTED, ORAL ORAL
Qty: 2 BOTTLE | Refills: 0 | Status: SHIPPED | OUTPATIENT
Start: 2020-09-14 | End: 2020-11-02 | Stop reason: ALTCHOICE

## 2020-09-14 NOTE — TELEPHONE ENCOUNTER
Colonoscopy scheduled for Nov 2nd at [facility] with Dr Jeff Boogie instructions given verbally/ mailed
Patients GI provider:  Dr Holli Brock    Number to return call: ( [phone]    Reason for call: Pt calling to schedule recall colon for october    Scheduled procedure/appointment date if applicable: Apt/procedure  na
Pt called stating she was returning a call to sched recall procedure
child(neymar)

## 2020-09-15 DIAGNOSIS — D58.2 ELEVATED HEMOGLOBIN (HCC): Primary | ICD-10-CM

## 2020-09-15 LAB
ALBUMIN SERPL-MCNC: 4.3 G/DL (ref 3.6–5.1)
ALBUMIN/GLOB SERPL: 1.5 (CALC) (ref 1–2.5)
ALP SERPL-CCNC: 96 U/L (ref 37–153)
ALT SERPL-CCNC: 20 U/L (ref 6–29)
AST SERPL-CCNC: 15 U/L (ref 10–35)
BASOPHILS # BLD AUTO: 62 CELLS/UL (ref 0–200)
BASOPHILS NFR BLD AUTO: 0.9 %
BILIRUB SERPL-MCNC: 0.5 MG/DL (ref 0.2–1.2)
BUN SERPL-MCNC: 17 MG/DL (ref 7–25)
BUN/CREAT SERPL: ABNORMAL (CALC) (ref 6–22)
CALCIUM SERPL-MCNC: 10.1 MG/DL (ref 8.6–10.4)
CHLORIDE SERPL-SCNC: 103 MMOL/L (ref 98–110)
CO2 SERPL-SCNC: 29 MMOL/L (ref 20–32)
CREAT SERPL-MCNC: 0.93 MG/DL (ref 0.5–0.99)
EOSINOPHIL # BLD AUTO: 159 CELLS/UL (ref 15–500)
EOSINOPHIL NFR BLD AUTO: 2.3 %
ERYTHROCYTE [DISTWIDTH] IN BLOOD BY AUTOMATED COUNT: 13 % (ref 11–15)
GLOBULIN SER CALC-MCNC: 2.8 G/DL (CALC) (ref 1.9–3.7)
GLUCOSE SERPL-MCNC: 144 MG/DL (ref 65–99)
HBA1C MFR BLD: 6.2 % OF TOTAL HGB
HCT VFR BLD AUTO: 48.6 % (ref 35–45)
HGB BLD-MCNC: 15.9 G/DL (ref 11.7–15.5)
LYMPHOCYTES # BLD AUTO: 1766 CELLS/UL (ref 850–3900)
LYMPHOCYTES NFR BLD AUTO: 25.6 %
MCH RBC QN AUTO: 29.8 PG (ref 27–33)
MCHC RBC AUTO-ENTMCNC: 32.7 G/DL (ref 32–36)
MCV RBC AUTO: 91.2 FL (ref 80–100)
MONOCYTES # BLD AUTO: 704 CELLS/UL (ref 200–950)
MONOCYTES NFR BLD AUTO: 10.2 %
NEUTROPHILS # BLD AUTO: 4209 CELLS/UL (ref 1500–7800)
NEUTROPHILS NFR BLD AUTO: 61 %
PLATELET # BLD AUTO: 325 THOUSAND/UL (ref 140–400)
PMV BLD REES-ECKER: 12.2 FL (ref 7.5–12.5)
POTASSIUM SERPL-SCNC: 4.3 MMOL/L (ref 3.5–5.3)
PROT SERPL-MCNC: 7.1 G/DL (ref 6.1–8.1)
RBC # BLD AUTO: 5.33 MILLION/UL (ref 3.8–5.1)
SL AMB EGFR AFRICAN AMERICAN: 74 ML/MIN/1.73M2
SL AMB EGFR NON AFRICAN AMERICAN: 64 ML/MIN/1.73M2
SODIUM SERPL-SCNC: 144 MMOL/L (ref 135–146)
WBC # BLD AUTO: 6.9 THOUSAND/UL (ref 3.8–10.8)

## 2020-09-15 PROCEDURE — 3044F HG A1C LEVEL LT 7.0%: CPT | Performed by: FAMILY MEDICINE

## 2020-09-16 ENCOUNTER — TELEPHONE (OUTPATIENT)
Dept: FAMILY MEDICINE CLINIC | Facility: CLINIC | Age: 65
End: 2020-09-16

## 2020-09-16 NOTE — TELEPHONE ENCOUNTER
Spoke with Pt and she is aware of all her B/W results and MD's directions  No further questions at this time

## 2020-09-16 NOTE — RESULT ENCOUNTER NOTE
Please let patient know that her hemoglobin A1c stable at 6 2  I would like her to continue to work on avoiding sweets, working on regular exercise and weight loss  In addition, her other blood work including liver, kidneys and protein were within normal range  In addition, her hemoglobin is mildly elevated again  I would like to keep a close eye and recheck again in the next 1-2 months and will print this out  I would like her to drink 2-3 glasses of water prior to having this blood work done again    Thank you

## 2020-09-21 ENCOUNTER — OFFICE VISIT (OUTPATIENT)
Dept: FAMILY MEDICINE CLINIC | Facility: CLINIC | Age: 65
End: 2020-09-21
Payer: COMMERCIAL

## 2020-09-21 VITALS
HEIGHT: 60 IN | BODY MASS INDEX: 35.46 KG/M2 | WEIGHT: 180.6 LBS | DIASTOLIC BLOOD PRESSURE: 70 MMHG | TEMPERATURE: 97.4 F | SYSTOLIC BLOOD PRESSURE: 136 MMHG | OXYGEN SATURATION: 97 % | HEART RATE: 75 BPM | RESPIRATION RATE: 17 BRPM

## 2020-09-21 DIAGNOSIS — E04.2 MULTIPLE THYROID NODULES: ICD-10-CM

## 2020-09-21 DIAGNOSIS — E78.2 MIXED HYPERLIPIDEMIA: ICD-10-CM

## 2020-09-21 DIAGNOSIS — R53.83 FATIGUE, UNSPECIFIED TYPE: ICD-10-CM

## 2020-09-21 DIAGNOSIS — Z13.820 SCREENING FOR OSTEOPOROSIS: ICD-10-CM

## 2020-09-21 DIAGNOSIS — G47.33 OBSTRUCTIVE SLEEP APNEA: ICD-10-CM

## 2020-09-21 DIAGNOSIS — D58.2 ELEVATED HEMOGLOBIN (HCC): ICD-10-CM

## 2020-09-21 DIAGNOSIS — R91.1 PULMONARY NODULE: ICD-10-CM

## 2020-09-21 DIAGNOSIS — E11.9 TYPE 2 DIABETES MELLITUS WITHOUT COMPLICATION, WITHOUT LONG-TERM CURRENT USE OF INSULIN (HCC): ICD-10-CM

## 2020-09-21 DIAGNOSIS — I10 HYPERTENSION, UNSPECIFIED TYPE: Primary | ICD-10-CM

## 2020-09-21 DIAGNOSIS — R07.89 BURNING IN THE CHEST: ICD-10-CM

## 2020-09-21 PROCEDURE — 99214 OFFICE O/P EST MOD 30 MIN: CPT | Performed by: FAMILY MEDICINE

## 2020-09-21 PROCEDURE — 1160F RVW MEDS BY RX/DR IN RCRD: CPT | Performed by: FAMILY MEDICINE

## 2020-09-21 PROCEDURE — 93000 ELECTROCARDIOGRAM COMPLETE: CPT | Performed by: FAMILY MEDICINE

## 2020-09-21 PROCEDURE — 1036F TOBACCO NON-USER: CPT | Performed by: FAMILY MEDICINE
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
## 2020-09-22 ENCOUNTER — EVALUATION (OUTPATIENT)
Dept: PHYSICAL THERAPY | Facility: REHABILITATION | Age: 65
End: 2020-09-22
Payer: COMMERCIAL

## 2020-09-22 DIAGNOSIS — H81.10 BENIGN PAROXYSMAL VERTIGO, UNSPECIFIED LATERALITY: Primary | ICD-10-CM

## 2020-09-22 PROCEDURE — 97161 PT EVAL LOW COMPLEX 20 MIN: CPT | Performed by: PHYSICAL THERAPIST

## 2020-09-22 PROCEDURE — 97112 NEUROMUSCULAR REEDUCATION: CPT | Performed by: PHYSICAL THERAPIST
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
## 2020-09-24 ENCOUNTER — TELEPHONE (OUTPATIENT)
Dept: FAMILY MEDICINE CLINIC | Facility: CLINIC | Age: 65
End: 2020-09-24

## 2020-09-24 LAB — TROPONIN I SERPL-MCNC: <0.01 NG/ML

## 2020-09-24 NOTE — TELEPHONE ENCOUNTER
----- Message from Milo Cornejo MD sent at 9/24/2020  8:04 AM EDT -----  Please let patient know that her blood test/cardiac marker was negative    Thank you

## 2020-09-25 ENCOUNTER — OFFICE VISIT (OUTPATIENT)
Dept: PHYSICAL THERAPY | Facility: REHABILITATION | Age: 65
End: 2020-09-25
Payer: COMMERCIAL

## 2020-09-25 DIAGNOSIS — H81.10 BENIGN PAROXYSMAL VERTIGO, UNSPECIFIED LATERALITY: Primary | ICD-10-CM

## 2020-09-25 PROCEDURE — 97140 MANUAL THERAPY 1/> REGIONS: CPT | Performed by: PHYSICAL THERAPIST

## 2020-09-25 PROCEDURE — 97112 NEUROMUSCULAR REEDUCATION: CPT | Performed by: PHYSICAL THERAPIST

## 2020-09-25 NOTE — PROGRESS NOTES
Daily Note     Today's date: 2020  Patient name: Peg White  : 1955  MRN: [id number]  Referring provider: JOSHUA Dodd  Dx:   Encounter Diagnosis     ICD-10-CM    1  Benign paroxysmal vertigo, unspecified laterality  H81 10        Start Time: 830  Stop Time: 910  Total time in clinic (min): 40 minutes    Subjective: Patient reports feeling well since visit on Monday until last night when she threw her remote to her TV to her   States that she looked to her left and became dizzy  Did report chest pain and heart burn as well  Nausea ensued later in the night but she did not go to ER  Objective: See treatment diary below  BP: 160/78, 152/80  HR: 68  VBI: (-)  Kenton pike (+) L      Assessment: Tolerated treatment well  Patient became very symptomatic with room spinning sensation with kenton pike testing  Tx followed for left posterior canalithiasis as she responded very well with no symptoms on second test/treat  Seated end of session x 10  min, left asymptomatic  Plan: Continue per plan of care        Precautions: HTN      Manuals             assessment 10'                                                   Neuro Re-Ed             epely  x2            VORx1             Recovery time 10'                                                                Ther Ex                                                                                                                     Ther Activity                                       Gait Training                                       Modalities

## 2020-09-29 ENCOUNTER — OFFICE VISIT (OUTPATIENT)
Dept: PHYSICAL THERAPY | Facility: REHABILITATION | Age: 65
End: 2020-09-29
Payer: COMMERCIAL

## 2020-09-29 DIAGNOSIS — H81.10 BENIGN PAROXYSMAL VERTIGO, UNSPECIFIED LATERALITY: Primary | ICD-10-CM

## 2020-09-29 PROCEDURE — 97112 NEUROMUSCULAR REEDUCATION: CPT | Performed by: PHYSICAL THERAPIST

## 2020-09-29 NOTE — PROGRESS NOTES
Daily Note     Today's date: 2020  Patient name: Deisy Rice  : 1955  MRN: [id number]  Referring provider: JOSHUA Wilson  Dx:   Encounter Diagnosis     ICD-10-CM    1  Benign paroxysmal vertigo, unspecified laterality  H81 10        Start Time: 0800  Stop Time: 0830  Total time in clinic (min): 30 minutes    Subjective: Patient reports feeling better with less dizziness since last session  States that she will feel like it wants to "come on" but does not, examples include prolonged bending over petting her dog  Objective: See treatment diary below  Kenton villagran (-) on L    Assessment: Tolerated treatment well  Patient was progressed with VOR exercises to standing as well as EC balance and biodex balance  Tolerated well  Plan: Continue per plan of care        Precautions: HTN      Manuals             assessment 5'                                                   Neuro Re-Ed             epely              VORx1 seated/standin            EC balance-narrow suzie 30"x3            Biodex Random-foam-3'            Walking VOR L/R,U/D-15'x10 each                                      Ther Ex                                                                                                                     Ther Activity                                       Gait Training                                       Modalities

## 2020-10-01 ENCOUNTER — OFFICE VISIT (OUTPATIENT)
Dept: PHYSICAL THERAPY | Facility: REHABILITATION | Age: 65
End: 2020-10-01
Payer: COMMERCIAL

## 2020-10-01 DIAGNOSIS — H81.10 BENIGN PAROXYSMAL VERTIGO, UNSPECIFIED LATERALITY: Primary | ICD-10-CM

## 2020-10-01 PROCEDURE — 97112 NEUROMUSCULAR REEDUCATION: CPT | Performed by: PHYSICAL THERAPIST

## 2020-10-02 DIAGNOSIS — I10 ESSENTIAL HYPERTENSION: ICD-10-CM

## 2020-10-05 DIAGNOSIS — I10 ESSENTIAL HYPERTENSION: ICD-10-CM

## 2020-10-05 PROCEDURE — 4010F ACE/ARB THERAPY RXD/TAKEN: CPT | Performed by: FAMILY MEDICINE

## 2020-10-05 RX ORDER — LISINOPRIL 40 MG/1
40 TABLET ORAL
Qty: 90 TABLET | Refills: 3 | Status: SHIPPED | OUTPATIENT
Start: 2020-10-05 | End: 2021-10-13

## 2020-10-09 ENCOUNTER — APPOINTMENT (OUTPATIENT)
Dept: PHYSICAL THERAPY | Facility: REHABILITATION | Age: 65
End: 2020-10-09
Payer: COMMERCIAL

## 2020-10-12 ENCOUNTER — HOSPITAL ENCOUNTER (OUTPATIENT)
Dept: BONE DENSITY | Facility: IMAGING CENTER | Age: 65
Discharge: HOME | End: 2020-10-12
Payer: COMMERCIAL

## 2020-10-12 ENCOUNTER — HOSPITAL ENCOUNTER (OUTPATIENT)
Dept: CT IMAGING | Facility: HOSPITAL | Age: 65
Discharge: HOME | End: 2020-10-12
Payer: COMMERCIAL

## 2020-10-12 ENCOUNTER — HOSPITAL ENCOUNTER (OUTPATIENT)
Dept: ULTRASOUND IMAGING | Facility: HOSPITAL | Age: 65
Discharge: HOME | End: 2020-10-12
Payer: COMMERCIAL

## 2020-10-12 VITALS — HEIGHT: 62 IN | WEIGHT: 180 LBS | BODY MASS INDEX: 33.13 KG/M2

## 2020-10-12 DIAGNOSIS — Z12.31 VISIT FOR SCREENING MAMMOGRAM: ICD-10-CM

## 2020-10-12 DIAGNOSIS — R91.1 PULMONARY NODULE: ICD-10-CM

## 2020-10-12 DIAGNOSIS — E04.2 MULTIPLE THYROID NODULES: ICD-10-CM

## 2020-10-12 PROCEDURE — 77067 SCR MAMMO BI INCL CAD: CPT

## 2020-10-12 PROCEDURE — 77063 BREAST TOMOSYNTHESIS BI: CPT

## 2020-10-12 PROCEDURE — 71250 CT THORAX DX C-: CPT

## 2020-10-12 PROCEDURE — 76536 US EXAM OF HEAD AND NECK: CPT

## 2020-10-12 PROCEDURE — G1004 CDSM NDSC: HCPCS

## 2020-10-16 ENCOUNTER — HOSPITAL ENCOUNTER (OUTPATIENT)
Dept: MAMMOGRAPHY | Facility: CLINIC | Age: 65
Discharge: HOME | End: 2020-10-16
Payer: COMMERCIAL

## 2020-10-16 ENCOUNTER — HOSPITAL ENCOUNTER (OUTPATIENT)
Dept: ULTRASOUND IMAGING | Facility: CLINIC | Age: 65
Discharge: HOME | End: 2020-10-16
Payer: COMMERCIAL

## 2020-10-16 VITALS — HEIGHT: 61 IN | BODY MASS INDEX: 33.99 KG/M2 | WEIGHT: 180 LBS

## 2020-10-16 DIAGNOSIS — R92.8 ABNORMAL MAMMOGRAM: ICD-10-CM

## 2020-10-16 LAB
HCT VFR BLD AUTO: 48.7 % (ref 35–45)
HGB BLD-MCNC: 15.8 G/DL (ref 11.7–15.5)

## 2020-10-16 PROCEDURE — 77065 DX MAMMO INCL CAD UNI: CPT

## 2020-10-16 PROCEDURE — 76642 ULTRASOUND BREAST LIMITED: CPT

## 2020-10-16 PROCEDURE — G0279 TOMOSYNTHESIS, MAMMO: HCPCS

## 2020-10-19 ENCOUNTER — ANESTHESIA EVENT (OUTPATIENT)
Dept: GASTROENTEROLOGY | Facility: AMBULARY SURGERY CENTER | Age: 65
End: 2020-10-19

## 2020-10-20 ENCOUNTER — TELEPHONE (OUTPATIENT)
Dept: FAMILY MEDICINE CLINIC | Facility: CLINIC | Age: 65
End: 2020-10-20

## 2020-10-22 ENCOUNTER — TELEPHONE (OUTPATIENT)
Dept: SURGICAL ONCOLOGY | Facility: CLINIC | Age: 65
End: 2020-10-22

## 2020-10-28 ENCOUNTER — HOSPITAL ENCOUNTER (OUTPATIENT)
Dept: NON INVASIVE DIAGNOSTICS | Age: 65
Discharge: HOME | End: 2020-10-28
Payer: COMMERCIAL

## 2020-10-28 DIAGNOSIS — R07.89 BURNING IN THE CHEST: ICD-10-CM

## 2020-10-28 PROCEDURE — 93306 TTE W/DOPPLER COMPLETE: CPT

## 2020-10-28 PROCEDURE — 93350 STRESS TTE ONLY: CPT

## 2020-10-28 PROCEDURE — 93306 TTE W/DOPPLER COMPLETE: CPT | Performed by: INTERNAL MEDICINE

## 2020-10-28 PROCEDURE — 93351 STRESS TTE COMPLETE: CPT | Performed by: INTERNAL MEDICINE

## 2020-10-29 LAB
CHEST PAIN STATEMENT: NORMAL
MAX DIASTOLIC BP: 90 MMHG
MAX HEART RATE: 144 BPM
MAX PREDICTED HEART RATE: 153 BPM
MAX. SYSTOLIC BP: 186 MMHG
PROTOCOL NAME: NORMAL
REASON FOR TERMINATION: NORMAL
TARGET HR FORMULA: NORMAL
TEST INDICATION: NORMAL
TIME IN EXERCISE PHASE: NORMAL

## 2020-11-02 ENCOUNTER — ANESTHESIA (OUTPATIENT)
Dept: GASTROENTEROLOGY | Facility: AMBULARY SURGERY CENTER | Age: 65
End: 2020-11-02

## 2020-11-02 ENCOUNTER — HOSPITAL ENCOUNTER (OUTPATIENT)
Dept: GASTROENTEROLOGY | Facility: AMBULARY SURGERY CENTER | Age: 65
Setting detail: OUTPATIENT SURGERY
Discharge: HOME | End: 2020-11-02
Attending: INTERNAL MEDICINE | Admitting: INTERNAL MEDICINE
Payer: COMMERCIAL

## 2020-11-02 ENCOUNTER — TELEPHONE (OUTPATIENT)
Dept: FAMILY MEDICINE CLINIC | Facility: CLINIC | Age: 65
End: 2020-11-02

## 2020-11-02 VITALS
BODY MASS INDEX: 33.23 KG/M2 | OXYGEN SATURATION: 99 % | HEART RATE: 74 BPM | SYSTOLIC BLOOD PRESSURE: 188 MMHG | DIASTOLIC BLOOD PRESSURE: 84 MMHG | TEMPERATURE: 97.1 F | RESPIRATION RATE: 16 BRPM | HEIGHT: 61 IN | WEIGHT: 176 LBS

## 2020-11-02 VITALS — HEART RATE: 74 BPM

## 2020-11-02 DIAGNOSIS — E78.5 DYSLIPIDEMIA: ICD-10-CM

## 2020-11-02 DIAGNOSIS — I10 HYPERTENSION, UNSPECIFIED TYPE: ICD-10-CM

## 2020-11-02 DIAGNOSIS — Z12.11 SCREEN FOR COLON CANCER: ICD-10-CM

## 2020-11-02 DIAGNOSIS — K44.9 HIATAL HERNIA: Primary | ICD-10-CM

## 2020-11-02 DIAGNOSIS — R07.89 CHEST WALL TENDERNESS: ICD-10-CM

## 2020-11-02 PROCEDURE — G0121 COLON CA SCRN NOT HI RSK IND: HCPCS | Performed by: INTERNAL MEDICINE

## 2020-11-02 RX ORDER — LABETALOL 20 MG/4 ML (5 MG/ML) INTRAVENOUS SYRINGE
AS NEEDED
Status: DISCONTINUED | OUTPATIENT
Start: 2020-11-02 | End: 2020-11-02

## 2020-11-02 RX ORDER — SIMETHICONE 20 MG/.3ML
EMULSION ORAL CODE/TRAUMA/SEDATION MEDICATION
Status: COMPLETED | OUTPATIENT
Start: 2020-11-02 | End: 2020-11-02

## 2020-11-02 RX ORDER — PROPOFOL 10 MG/ML
INJECTION, EMULSION INTRAVENOUS AS NEEDED
Status: DISCONTINUED | OUTPATIENT
Start: 2020-11-02 | End: 2020-11-02

## 2020-11-02 RX ORDER — SODIUM CHLORIDE, SODIUM LACTATE, POTASSIUM CHLORIDE, CALCIUM CHLORIDE 600; 310; 30; 20 MG/100ML; MG/100ML; MG/100ML; MG/100ML
INJECTION, SOLUTION INTRAVENOUS CONTINUOUS PRN
Status: DISCONTINUED | OUTPATIENT
Start: 2020-11-02 | End: 2020-11-02

## 2020-11-02 RX ORDER — LIDOCAINE HYDROCHLORIDE 10 MG/ML
INJECTION, SOLUTION EPIDURAL; INFILTRATION; INTRACAUDAL; PERINEURAL AS NEEDED
Status: DISCONTINUED | OUTPATIENT
Start: 2020-11-02 | End: 2020-11-02

## 2020-11-02 RX ADMIN — PROPOFOL 20 MG: 10 INJECTION, EMULSION INTRAVENOUS at 09:42

## 2020-11-02 RX ADMIN — LABETALOL 20 MG/4 ML (5 MG/ML) INTRAVENOUS SYRINGE 5 MG: at 09:40

## 2020-11-02 RX ADMIN — PROPOFOL 100 MG: 10 INJECTION, EMULSION INTRAVENOUS at 09:25

## 2020-11-02 RX ADMIN — LABETALOL 20 MG/4 ML (5 MG/ML) INTRAVENOUS SYRINGE 5 MG: at 09:33

## 2020-11-02 RX ADMIN — Medication 40 MG: at 09:36

## 2020-11-02 RX ADMIN — PROPOFOL 50 MG: 10 INJECTION, EMULSION INTRAVENOUS at 09:35

## 2020-11-02 RX ADMIN — SODIUM CHLORIDE, SODIUM LACTATE, POTASSIUM CHLORIDE, AND CALCIUM CHLORIDE: .6; .31; .03; .02 INJECTION, SOLUTION INTRAVENOUS at 09:14

## 2020-11-02 RX ADMIN — LIDOCAINE HYDROCHLORIDE 50 MG: 10 INJECTION, SOLUTION EPIDURAL; INFILTRATION; INTRACAUDAL at 09:25

## 2020-11-02 RX ADMIN — PROPOFOL 50 MG: 10 INJECTION, EMULSION INTRAVENOUS at 09:30

## 2020-11-03 ENCOUNTER — TELEPHONE (OUTPATIENT)
Dept: CARDIOLOGY CLINIC | Facility: CLINIC | Age: 65
End: 2020-11-03

## 2020-11-09 ENCOUNTER — HOSPITAL ENCOUNTER (OUTPATIENT)
Dept: MAMMOGRAPHY | Facility: CLINIC | Age: 65
Discharge: HOME | End: 2020-11-09
Payer: COMMERCIAL

## 2020-11-09 VITALS
DIASTOLIC BLOOD PRESSURE: 88 MMHG | WEIGHT: 176 LBS | HEIGHT: 61 IN | TEMPERATURE: 97.8 F | HEART RATE: 80 BPM | SYSTOLIC BLOOD PRESSURE: 148 MMHG | BODY MASS INDEX: 33.23 KG/M2

## 2020-11-09 DIAGNOSIS — R92.8 ABNORMAL MAMMOGRAM: ICD-10-CM

## 2020-11-09 PROCEDURE — 88342 IMHCHEM/IMCYTCHM 1ST ANTB: CPT | Performed by: PATHOLOGY

## 2020-11-09 PROCEDURE — 88341 IMHCHEM/IMCYTCHM EA ADD ANTB: CPT | Performed by: PATHOLOGY

## 2020-11-09 PROCEDURE — 19081 BX BREAST 1ST LESION STRTCTC: CPT

## 2020-11-09 PROCEDURE — 88305 TISSUE EXAM BY PATHOLOGIST: CPT | Performed by: PATHOLOGY

## 2020-11-09 RX ORDER — LIDOCAINE HYDROCHLORIDE AND EPINEPHRINE BITARTRATE 20; .01 MG/ML; MG/ML
10 INJECTION, SOLUTION SUBCUTANEOUS ONCE
Status: COMPLETED | OUTPATIENT
Start: 2020-11-09 | End: 2020-11-09

## 2020-11-09 RX ORDER — LIDOCAINE HYDROCHLORIDE 10 MG/ML
5 INJECTION, SOLUTION EPIDURAL; INFILTRATION; INTRACAUDAL; PERINEURAL ONCE
Status: COMPLETED | OUTPATIENT
Start: 2020-11-09 | End: 2020-11-09

## 2020-11-09 RX ADMIN — LIDOCAINE HYDROCHLORIDE AND EPINEPHRINE 10 ML: 20; 10 INJECTION, SOLUTION INFILTRATION; PERINEURAL at 09:22

## 2020-11-09 RX ADMIN — LIDOCAINE HYDROCHLORIDE 5 ML: 10 INJECTION, SOLUTION EPIDURAL; INFILTRATION; INTRACAUDAL; PERINEURAL at 09:22

## 2020-11-12 ENCOUNTER — TELEPHONE (OUTPATIENT)
Dept: MAMMOGRAPHY | Facility: CLINIC | Age: 65
End: 2020-11-12

## 2020-11-12 ENCOUNTER — CONSULT (OUTPATIENT)
Dept: HEMATOLOGY ONCOLOGY | Facility: CLINIC | Age: 65
End: 2020-11-12
Payer: COMMERCIAL

## 2020-11-12 VITALS
HEART RATE: 64 BPM | BODY MASS INDEX: 33.31 KG/M2 | WEIGHT: 181 LBS | DIASTOLIC BLOOD PRESSURE: 86 MMHG | SYSTOLIC BLOOD PRESSURE: 144 MMHG | TEMPERATURE: 98.9 F | RESPIRATION RATE: 12 BRPM | HEIGHT: 62 IN | OXYGEN SATURATION: 97 %

## 2020-11-12 DIAGNOSIS — D47.1 MYELOPROLIFERATIVE DISORDER (HCC): ICD-10-CM

## 2020-11-12 DIAGNOSIS — D58.2 ELEVATED HEMOGLOBIN (HCC): Primary | ICD-10-CM

## 2020-11-12 PROCEDURE — 99204 OFFICE O/P NEW MOD 45 MIN: CPT | Performed by: PHYSICIAN ASSISTANT

## 2020-11-12 PROCEDURE — 3008F BODY MASS INDEX DOCD: CPT | Performed by: PHYSICIAN ASSISTANT

## 2020-11-12 PROCEDURE — 3079F DIAST BP 80-89 MM HG: CPT | Performed by: PHYSICIAN ASSISTANT

## 2020-11-12 PROCEDURE — 1036F TOBACCO NON-USER: CPT | Performed by: PHYSICIAN ASSISTANT

## 2020-11-12 PROCEDURE — 3077F SYST BP >= 140 MM HG: CPT | Performed by: PHYSICIAN ASSISTANT

## 2020-11-17 ENCOUNTER — VBI (OUTPATIENT)
Dept: ADMINISTRATIVE | Facility: OTHER | Age: 65
End: 2020-11-17

## 2020-11-23 LAB
ASSAY DETAILS: NORMAL
BASOPHILS # BLD AUTO: 51 CELLS/UL (ref 0–200)
BASOPHILS NFR BLD AUTO: 0.8 %
CALR EXON 9 MUT ANL BLD/T: NOT DETECTED
CLINICAL INFO: NORMAL
CSF3R EXON 14/17 MUTATION: NOT DETECTED
EOSINOPHIL # BLD AUTO: 128 CELLS/UL (ref 15–500)
EOSINOPHIL NFR BLD AUTO: 2 %
EPO SERPL-ACNC: 13.2 MIU/ML (ref 2.6–18.5)
ERYTHROCYTE [DISTWIDTH] IN BLOOD BY AUTOMATED COUNT: 13.1 % (ref 11–15)
HCT VFR BLD AUTO: 48.2 % (ref 35–45)
HGB BLD-MCNC: 15.7 G/DL (ref 11.7–15.5)
JAK2 EXON 12 MUT ANL BLD/T: NOT DETECTED
JAK2 V617F MUTATION: NOT DETECTED
LYMPHOCYTES # BLD AUTO: 1754 CELLS/UL (ref 850–3900)
LYMPHOCYTES NFR BLD AUTO: 27.4 %
MCH RBC QN AUTO: 29.7 PG (ref 27–33)
MCHC RBC AUTO-ENTMCNC: 32.6 G/DL (ref 32–36)
MCV RBC AUTO: 91.1 FL (ref 80–100)
MICRODELETION SYND BLD/T FISH: NORMAL
MONOCYTES # BLD AUTO: 813 CELLS/UL (ref 200–950)
MONOCYTES NFR BLD AUTO: 12.7 %
MPL EXON 10 MUTATION: NOT DETECTED
NEUTROPHILS # BLD AUTO: 3654 CELLS/UL (ref 1500–7800)
NEUTROPHILS NFR BLD AUTO: 57.1 %
PLATELET # BLD AUTO: 301 THOUSAND/UL (ref 140–400)
PMV BLD REES-ECKER: 11.8 FL (ref 7.5–12.5)
RBC # BLD AUTO: 5.29 MILLION/UL (ref 3.8–5.1)
SL AMB BLOCK/SAMPLE NUMBER: NORMAL
SPECIMEN SOURCE: NORMAL
WBC # BLD AUTO: 6.4 THOUSAND/UL (ref 3.8–10.8)

## 2021-01-13 ENCOUNTER — OFFICE VISIT (OUTPATIENT)
Dept: CARDIOLOGY CLINIC | Facility: CLINIC | Age: 66
End: 2021-01-13
Payer: COMMERCIAL

## 2021-01-13 VITALS
OXYGEN SATURATION: 96 % | HEART RATE: 69 BPM | HEIGHT: 62 IN | DIASTOLIC BLOOD PRESSURE: 76 MMHG | SYSTOLIC BLOOD PRESSURE: 144 MMHG | BODY MASS INDEX: 33.8 KG/M2 | WEIGHT: 183.7 LBS

## 2021-01-13 DIAGNOSIS — R07.89 CHEST WALL TENDERNESS: ICD-10-CM

## 2021-01-13 DIAGNOSIS — I10 BENIGN ESSENTIAL HYPERTENSION: Primary | ICD-10-CM

## 2021-01-13 DIAGNOSIS — E78.5 DYSLIPIDEMIA: ICD-10-CM

## 2021-01-13 DIAGNOSIS — E78.2 MIXED HYPERLIPIDEMIA: ICD-10-CM

## 2021-01-13 DIAGNOSIS — E11.00 TYPE 2 DIABETES MELLITUS WITH HYPEROSMOLARITY WITHOUT COMA, WITHOUT LONG-TERM CURRENT USE OF INSULIN (HCC): ICD-10-CM

## 2021-01-13 DIAGNOSIS — G47.33 OBSTRUCTIVE SLEEP APNEA: ICD-10-CM

## 2021-01-13 PROCEDURE — 99214 OFFICE O/P EST MOD 30 MIN: CPT | Performed by: INTERNAL MEDICINE

## 2021-01-13 PROCEDURE — 3078F DIAST BP <80 MM HG: CPT | Performed by: INTERNAL MEDICINE

## 2021-01-13 PROCEDURE — 3077F SYST BP >= 140 MM HG: CPT | Performed by: INTERNAL MEDICINE
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
## 2021-01-18 ENCOUNTER — HOSPITAL ENCOUNTER (OUTPATIENT)
Dept: BONE DENSITY | Facility: IMAGING CENTER | Age: 66
Discharge: HOME | End: 2021-01-18
Payer: COMMERCIAL

## 2021-01-18 DIAGNOSIS — Z13.820 SCREENING FOR OSTEOPOROSIS: ICD-10-CM

## 2021-01-18 PROCEDURE — 77080 DXA BONE DENSITY AXIAL: CPT

## 2021-01-19 ENCOUNTER — TELEPHONE (OUTPATIENT)
Dept: FAMILY MEDICINE CLINIC | Facility: CLINIC | Age: 66
End: 2021-01-19

## 2021-01-19 NOTE — TELEPHONE ENCOUNTER
----- Message from Ingris Pop MD sent at 1/19/2021 12:43 PM EST -----  Please let patient know that her bone density shows osteopenia, not osteoporosis  I would like her to work on muscle strengthening, weight-bearing exercises as well as calcium and vitamin-D supplementation  We will continue to monitor this every 2 years    Thank you

## 2021-01-19 NOTE — RESULT ENCOUNTER NOTE
Please let patient know that her bone density shows osteopenia, not osteoporosis  I would like her to work on muscle strengthening, weight-bearing exercises as well as calcium and vitamin-D supplementation  We will continue to monitor this every 2 years    Thank you

## 2021-01-21 ENCOUNTER — OFFICE VISIT (OUTPATIENT)
Dept: HEMATOLOGY ONCOLOGY | Facility: CLINIC | Age: 66
End: 2021-01-21
Payer: COMMERCIAL

## 2021-01-21 VITALS
OXYGEN SATURATION: 98 % | DIASTOLIC BLOOD PRESSURE: 56 MMHG | BODY MASS INDEX: 34.04 KG/M2 | TEMPERATURE: 97.5 F | HEART RATE: 69 BPM | HEIGHT: 62 IN | RESPIRATION RATE: 18 BRPM | SYSTOLIC BLOOD PRESSURE: 144 MMHG | WEIGHT: 185 LBS

## 2021-01-21 DIAGNOSIS — D58.2 ELEVATED HEMOGLOBIN (HCC): Primary | ICD-10-CM

## 2021-01-21 PROCEDURE — 99213 OFFICE O/P EST LOW 20 MIN: CPT | Performed by: PHYSICIAN ASSISTANT

## 2021-01-21 PROCEDURE — 1160F RVW MEDS BY RX/DR IN RCRD: CPT | Performed by: PHYSICIAN ASSISTANT

## 2021-01-21 PROCEDURE — 3008F BODY MASS INDEX DOCD: CPT | Performed by: PHYSICIAN ASSISTANT

## 2021-01-21 PROCEDURE — 1036F TOBACCO NON-USER: CPT | Performed by: PHYSICIAN ASSISTANT
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
## 2021-02-10 LAB
ALBUMIN SERPL-MCNC: 4.1 G/DL (ref 3.6–5.1)
ALBUMIN/GLOB SERPL: 1.6 (CALC) (ref 1–2.5)
ALP SERPL-CCNC: 101 U/L (ref 37–153)
ALT SERPL-CCNC: 19 U/L (ref 6–29)
AST SERPL-CCNC: 12 U/L (ref 10–35)
BASOPHILS # BLD AUTO: 62 CELLS/UL (ref 0–200)
BASOPHILS NFR BLD AUTO: 0.7 %
BILIRUB SERPL-MCNC: 0.5 MG/DL (ref 0.2–1.2)
BUN SERPL-MCNC: 16 MG/DL (ref 7–25)
BUN/CREAT SERPL: ABNORMAL (CALC) (ref 6–22)
CALCIUM SERPL-MCNC: 9.4 MG/DL (ref 8.6–10.4)
CHLORIDE SERPL-SCNC: 107 MMOL/L (ref 98–110)
CHOLEST SERPL-MCNC: 197 MG/DL
CHOLEST/HDLC SERPL: 3.3 (CALC)
CO2 SERPL-SCNC: 25 MMOL/L (ref 20–32)
CREAT SERPL-MCNC: 0.77 MG/DL (ref 0.5–0.99)
EOSINOPHIL # BLD AUTO: 123 CELLS/UL (ref 15–500)
EOSINOPHIL NFR BLD AUTO: 1.4 %
ERYTHROCYTE [DISTWIDTH] IN BLOOD BY AUTOMATED COUNT: 13 % (ref 11–15)
GLOBULIN SER CALC-MCNC: 2.6 G/DL (CALC) (ref 1.9–3.7)
GLUCOSE SERPL-MCNC: 157 MG/DL (ref 65–99)
HBA1C MFR BLD: 6.6 % OF TOTAL HGB
HCT VFR BLD AUTO: 47.3 % (ref 35–45)
HDLC SERPL-MCNC: 60 MG/DL
HGB BLD-MCNC: 15.6 G/DL (ref 11.7–15.5)
LDLC SERPL CALC-MCNC: 115 MG/DL (CALC)
LYMPHOCYTES # BLD AUTO: 1954 CELLS/UL (ref 850–3900)
LYMPHOCYTES NFR BLD AUTO: 22.2 %
MCH RBC QN AUTO: 29.7 PG (ref 27–33)
MCHC RBC AUTO-ENTMCNC: 33 G/DL (ref 32–36)
MCV RBC AUTO: 89.9 FL (ref 80–100)
MONOCYTES # BLD AUTO: 766 CELLS/UL (ref 200–950)
MONOCYTES NFR BLD AUTO: 8.7 %
NEUTROPHILS # BLD AUTO: 5896 CELLS/UL (ref 1500–7800)
NEUTROPHILS NFR BLD AUTO: 67 %
NONHDLC SERPL-MCNC: 137 MG/DL (CALC)
PLATELET # BLD AUTO: 309 THOUSAND/UL (ref 140–400)
PMV BLD REES-ECKER: 12.3 FL (ref 7.5–12.5)
POTASSIUM SERPL-SCNC: 4.3 MMOL/L (ref 3.5–5.3)
PROT SERPL-MCNC: 6.7 G/DL (ref 6.1–8.1)
RBC # BLD AUTO: 5.26 MILLION/UL (ref 3.8–5.1)
SL AMB EGFR AFRICAN AMERICAN: 93 ML/MIN/1.73M2
SL AMB EGFR NON AFRICAN AMERICAN: 80 ML/MIN/1.73M2
SODIUM SERPL-SCNC: 142 MMOL/L (ref 135–146)
TRIGL SERPL-MCNC: 111 MG/DL
TSH SERPL-ACNC: 0.98 MIU/L (ref 0.4–4.5)
WBC # BLD AUTO: 8.8 THOUSAND/UL (ref 3.8–10.8)

## 2021-02-10 PROCEDURE — 3044F HG A1C LEVEL LT 7.0%: CPT | Performed by: FAMILY MEDICINE

## 2021-02-11 NOTE — RESULT ENCOUNTER NOTE
Please let patient know that her total cholesterol was 197, decreased from 207 and her bad cholesterol is now 115, decreased from 124  Her good cholesterol remains stable  Her other blood work including thyroid, liver, kidneys was within normal range  Her hemoglobin does remain mildly elevated  I would like her to continue to follow-up with the hematologist   Her hemoglobin A1c has increased mildly, noted to be 6 6  I would like her to work on avoiding sweets, limiting carbohydrates and working on regular exercise as well as weight loss  We will continue to monitor this    Thank you

## 2021-02-21 DIAGNOSIS — E11.9 TYPE 2 DIABETES MELLITUS WITHOUT COMPLICATION, WITHOUT LONG-TERM CURRENT USE OF INSULIN (HCC): ICD-10-CM

## 2021-02-21 DIAGNOSIS — I10 BENIGN ESSENTIAL HYPERTENSION: ICD-10-CM

## 2021-02-22 RX ORDER — AMLODIPINE BESYLATE 10 MG/1
TABLET ORAL
Qty: 90 TABLET | Refills: 3 | Status: SHIPPED | OUTPATIENT
Start: 2021-02-22 | End: 2022-01-17

## 2021-02-22 RX ORDER — REPAGLINIDE 0.5 MG/1
0.5 TABLET ORAL
Qty: 90 TABLET | Refills: 3 | Status: SHIPPED | OUTPATIENT
Start: 2021-02-22 | End: 2022-04-17

## 2021-03-22 ENCOUNTER — OFFICE VISIT (OUTPATIENT)
Dept: FAMILY MEDICINE CLINIC | Facility: CLINIC | Age: 66
End: 2021-03-22
Payer: COMMERCIAL

## 2021-03-22 VITALS
DIASTOLIC BLOOD PRESSURE: 70 MMHG | OXYGEN SATURATION: 97 % | SYSTOLIC BLOOD PRESSURE: 130 MMHG | BODY MASS INDEX: 33.95 KG/M2 | HEIGHT: 62 IN | TEMPERATURE: 97.5 F | WEIGHT: 184.5 LBS | RESPIRATION RATE: 16 BRPM | HEART RATE: 69 BPM

## 2021-03-22 DIAGNOSIS — R91.1 PULMONARY NODULE: ICD-10-CM

## 2021-03-22 DIAGNOSIS — G47.33 OBSTRUCTIVE SLEEP APNEA: ICD-10-CM

## 2021-03-22 DIAGNOSIS — E78.2 MIXED HYPERLIPIDEMIA: ICD-10-CM

## 2021-03-22 DIAGNOSIS — E04.2 MULTIPLE THYROID NODULES: ICD-10-CM

## 2021-03-22 DIAGNOSIS — D58.2 ELEVATED HEMOGLOBIN (HCC): ICD-10-CM

## 2021-03-22 DIAGNOSIS — E11.9 TYPE 2 DIABETES MELLITUS WITHOUT COMPLICATION, WITHOUT LONG-TERM CURRENT USE OF INSULIN (HCC): ICD-10-CM

## 2021-03-22 DIAGNOSIS — Z23 ENCOUNTER FOR IMMUNIZATION: ICD-10-CM

## 2021-03-22 DIAGNOSIS — I10 HYPERTENSION, UNSPECIFIED TYPE: Primary | ICD-10-CM

## 2021-03-22 DIAGNOSIS — Z00.00 ROUTINE HEALTH MAINTENANCE: ICD-10-CM

## 2021-03-22 PROCEDURE — 99214 OFFICE O/P EST MOD 30 MIN: CPT | Performed by: FAMILY MEDICINE

## 2021-03-22 PROCEDURE — 1036F TOBACCO NON-USER: CPT | Performed by: FAMILY MEDICINE

## 2021-03-22 PROCEDURE — 3078F DIAST BP <80 MM HG: CPT | Performed by: FAMILY MEDICINE

## 2021-03-22 PROCEDURE — G0009 ADMIN PNEUMOCOCCAL VACCINE: HCPCS

## 2021-03-22 PROCEDURE — 3725F SCREEN DEPRESSION PERFORMED: CPT | Performed by: FAMILY MEDICINE

## 2021-03-22 PROCEDURE — 3075F SYST BP GE 130 - 139MM HG: CPT | Performed by: FAMILY MEDICINE

## 2021-03-22 PROCEDURE — 3008F BODY MASS INDEX DOCD: CPT | Performed by: FAMILY MEDICINE

## 2021-03-22 PROCEDURE — 1160F RVW MEDS BY RX/DR IN RCRD: CPT | Performed by: FAMILY MEDICINE

## 2021-03-22 PROCEDURE — 90732 PPSV23 VACC 2 YRS+ SUBQ/IM: CPT

## 2021-03-22 PROCEDURE — 4040F PNEUMOC VAC/ADMIN/RCVD: CPT | Performed by: FAMILY MEDICINE
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
## 2021-03-30 DIAGNOSIS — Z23 ENCOUNTER FOR IMMUNIZATION: ICD-10-CM

## 2021-07-19 LAB
BASOPHILS # BLD AUTO: 39 CELLS/UL (ref 0–200)
BASOPHILS NFR BLD AUTO: 0.7 %
BUN SERPL-MCNC: 15 MG/DL (ref 7–25)
BUN/CREAT SERPL: ABNORMAL (CALC) (ref 6–22)
CALCIUM SERPL-MCNC: 9.8 MG/DL (ref 8.6–10.4)
CHLORIDE SERPL-SCNC: 104 MMOL/L (ref 98–110)
CO2 SERPL-SCNC: 30 MMOL/L (ref 20–32)
CREAT SERPL-MCNC: 0.86 MG/DL (ref 0.5–0.99)
EOSINOPHIL # BLD AUTO: 179 CELLS/UL (ref 15–500)
EOSINOPHIL NFR BLD AUTO: 3.2 %
ERYTHROCYTE [DISTWIDTH] IN BLOOD BY AUTOMATED COUNT: 12.6 % (ref 11–15)
GLUCOSE SERPL-MCNC: 193 MG/DL (ref 65–99)
HBA1C MFR BLD: 6.9 % OF TOTAL HGB
HCT VFR BLD AUTO: 48.5 % (ref 35–45)
HGB BLD-MCNC: 15.8 G/DL (ref 11.7–15.5)
LYMPHOCYTES # BLD AUTO: 1618 CELLS/UL (ref 850–3900)
LYMPHOCYTES NFR BLD AUTO: 28.9 %
MCH RBC QN AUTO: 29.3 PG (ref 27–33)
MCHC RBC AUTO-ENTMCNC: 32.6 G/DL (ref 32–36)
MCV RBC AUTO: 90 FL (ref 80–100)
MONOCYTES # BLD AUTO: 510 CELLS/UL (ref 200–950)
MONOCYTES NFR BLD AUTO: 9.1 %
NEUTROPHILS # BLD AUTO: 3254 CELLS/UL (ref 1500–7800)
NEUTROPHILS NFR BLD AUTO: 58.1 %
PLATELET # BLD AUTO: 307 THOUSAND/UL (ref 140–400)
PMV BLD REES-ECKER: 12.5 FL (ref 7.5–12.5)
POTASSIUM SERPL-SCNC: 4.5 MMOL/L (ref 3.5–5.3)
RBC # BLD AUTO: 5.39 MILLION/UL (ref 3.8–5.1)
SL AMB EGFR AFRICAN AMERICAN: 81 ML/MIN/1.73M2
SL AMB EGFR NON AFRICAN AMERICAN: 70 ML/MIN/1.73M2
SODIUM SERPL-SCNC: 141 MMOL/L (ref 135–146)
WBC # BLD AUTO: 5.6 THOUSAND/UL (ref 3.8–10.8)

## 2021-07-19 PROCEDURE — 3044F HG A1C LEVEL LT 7.0%: CPT | Performed by: PHYSICIAN ASSISTANT

## 2021-07-20 ENCOUNTER — TELEPHONE (OUTPATIENT)
Dept: FAMILY MEDICINE CLINIC | Facility: CLINIC | Age: 66
End: 2021-07-20

## 2021-07-20 NOTE — TELEPHONE ENCOUNTER
----- Message from Beata Mcgee MD sent at 7/26/4256  9:32 AM EDT -----   Recent blood work stable for patient with A1c at 6 9

## 2021-07-22 ENCOUNTER — OFFICE VISIT (OUTPATIENT)
Dept: HEMATOLOGY ONCOLOGY | Facility: CLINIC | Age: 66
End: 2021-07-22
Payer: COMMERCIAL

## 2021-07-22 VITALS
BODY MASS INDEX: 34.04 KG/M2 | RESPIRATION RATE: 16 BRPM | WEIGHT: 185 LBS | HEIGHT: 62 IN | HEART RATE: 62 BPM | SYSTOLIC BLOOD PRESSURE: 128 MMHG | TEMPERATURE: 98.2 F | DIASTOLIC BLOOD PRESSURE: 80 MMHG | OXYGEN SATURATION: 98 %

## 2021-07-22 DIAGNOSIS — D58.2 ELEVATED HEMOGLOBIN (HCC): Primary | ICD-10-CM

## 2021-07-22 PROCEDURE — 1160F RVW MEDS BY RX/DR IN RCRD: CPT | Performed by: PHYSICIAN ASSISTANT

## 2021-07-22 PROCEDURE — 3074F SYST BP LT 130 MM HG: CPT | Performed by: PHYSICIAN ASSISTANT

## 2021-07-22 PROCEDURE — 1036F TOBACCO NON-USER: CPT | Performed by: PHYSICIAN ASSISTANT

## 2021-07-22 PROCEDURE — 3079F DIAST BP 80-89 MM HG: CPT | Performed by: PHYSICIAN ASSISTANT

## 2021-07-22 PROCEDURE — 3008F BODY MASS INDEX DOCD: CPT | Performed by: PHYSICIAN ASSISTANT

## 2021-07-22 PROCEDURE — 99214 OFFICE O/P EST MOD 30 MIN: CPT | Performed by: PHYSICIAN ASSISTANT
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
## 2021-08-02 LAB
LEFT EYE DIABETIC RETINOPATHY: NORMAL
RIGHT EYE DIABETIC RETINOPATHY: NORMAL

## 2021-09-10 ENCOUNTER — OFFICE VISIT (OUTPATIENT)
Dept: FAMILY MEDICINE CLINIC | Facility: CLINIC | Age: 66
End: 2021-09-10
Payer: COMMERCIAL

## 2021-09-10 VITALS
WEIGHT: 183.4 LBS | SYSTOLIC BLOOD PRESSURE: 128 MMHG | OXYGEN SATURATION: 98 % | TEMPERATURE: 98.1 F | BODY MASS INDEX: 33.75 KG/M2 | HEIGHT: 62 IN | RESPIRATION RATE: 16 BRPM | DIASTOLIC BLOOD PRESSURE: 82 MMHG | HEART RATE: 65 BPM

## 2021-09-10 DIAGNOSIS — D58.2 ELEVATED HEMOGLOBIN (HCC): ICD-10-CM

## 2021-09-10 DIAGNOSIS — Z00.00 MEDICARE ANNUAL WELLNESS VISIT, SUBSEQUENT: Primary | ICD-10-CM

## 2021-09-10 DIAGNOSIS — E78.5 DYSLIPIDEMIA: ICD-10-CM

## 2021-09-10 DIAGNOSIS — G47.33 OBSTRUCTIVE SLEEP APNEA: ICD-10-CM

## 2021-09-10 DIAGNOSIS — E11.9 TYPE 2 DIABETES MELLITUS WITHOUT COMPLICATION, WITHOUT LONG-TERM CURRENT USE OF INSULIN (HCC): ICD-10-CM

## 2021-09-10 DIAGNOSIS — E11.00 TYPE 2 DIABETES MELLITUS WITH HYPEROSMOLARITY WITHOUT COMA, WITHOUT LONG-TERM CURRENT USE OF INSULIN (HCC): ICD-10-CM

## 2021-09-10 DIAGNOSIS — E04.2 MULTIPLE THYROID NODULES: ICD-10-CM

## 2021-09-10 DIAGNOSIS — Z23 NEED FOR INFLUENZA VACCINATION: ICD-10-CM

## 2021-09-10 DIAGNOSIS — I10 BENIGN ESSENTIAL HYPERTENSION: ICD-10-CM

## 2021-09-10 DIAGNOSIS — M85.80 OSTEOPENIA, UNSPECIFIED LOCATION: ICD-10-CM

## 2021-09-10 PROCEDURE — 1036F TOBACCO NON-USER: CPT | Performed by: NURSE PRACTITIONER

## 2021-09-10 PROCEDURE — 3079F DIAST BP 80-89 MM HG: CPT | Performed by: NURSE PRACTITIONER

## 2021-09-10 PROCEDURE — 3074F SYST BP LT 130 MM HG: CPT | Performed by: NURSE PRACTITIONER

## 2021-09-10 PROCEDURE — 3288F FALL RISK ASSESSMENT DOCD: CPT | Performed by: NURSE PRACTITIONER

## 2021-09-10 PROCEDURE — 90662 IIV NO PRSV INCREASED AG IM: CPT

## 2021-09-10 PROCEDURE — 3008F BODY MASS INDEX DOCD: CPT | Performed by: NURSE PRACTITIONER

## 2021-09-10 PROCEDURE — 1125F AMNT PAIN NOTED PAIN PRSNT: CPT | Performed by: NURSE PRACTITIONER

## 2021-09-10 PROCEDURE — 1160F RVW MEDS BY RX/DR IN RCRD: CPT | Performed by: NURSE PRACTITIONER

## 2021-09-10 PROCEDURE — G0008 ADMIN INFLUENZA VIRUS VAC: HCPCS

## 2021-09-10 PROCEDURE — G0439 PPPS, SUBSEQ VISIT: HCPCS | Performed by: NURSE PRACTITIONER

## 2021-09-10 PROCEDURE — 99214 OFFICE O/P EST MOD 30 MIN: CPT | Performed by: NURSE PRACTITIONER

## 2021-09-10 PROCEDURE — 1170F FXNL STATUS ASSESSED: CPT | Performed by: NURSE PRACTITIONER

## 2021-09-10 RX ORDER — PRAVASTATIN SODIUM 10 MG
10 TABLET ORAL
Qty: 30 TABLET | Refills: 5 | Status: SHIPPED | OUTPATIENT
Start: 2021-09-10 | End: 2022-04-11 | Stop reason: SDUPTHER

## 2021-09-10 NOTE — PATIENT INSTRUCTIONS
Medicare Preventive Visit Patient Instructions  Thank you for completing your Welcome to Medicare Visit or Medicare Annual Wellness Visit today  Your next wellness visit will be due in one year (9/11/2022)  The screening/preventive services that you may require over the next 5-10 years are detailed below  Some tests may not apply to you based off risk factors and/or age  Screening tests ordered at today's visit but not completed yet may show as past due  Also, please note that scanned in results may not display below  Preventive Screenings:  Service Recommendations Previous Testing/Comments   Colorectal Cancer Screening  * Colonoscopy    * Fecal Occult Blood Test (FOBT)/Fecal Immunochemical Test (FIT)  * Fecal DNA/Cologuard Test  * Flexible Sigmoidoscopy Age: 54-65 years old   Colonoscopy: every 10 years (may be performed more frequently if at higher risk)  OR  FOBT/FIT: every 1 year  OR  Cologuard: every 3 years  OR  Sigmoidoscopy: every 5 years  Screening may be recommended earlier than age 48 if at higher risk for colorectal cancer  Also, an individualized decision between you and your healthcare provider will decide whether screening between the ages of 74-80 would be appropriate  Colonoscopy: 11/02/2020  FOBT/FIT: Not on file  Cologuard: Not on file  Sigmoidoscopy: Not on file    Screening Current     Breast Cancer Screening Age: 36 years old  Frequency: every 1-2 years  Not required if history of left and right mastectomy Mammogram: 10/16/2020    Screening Current   Cervical Cancer Screening Between the ages of 21-29, pap smear recommended once every 3 years  Between the ages of 33-67, can perform pap smear with HPV co-testing every 5 years     Recommendations may differ for women with a history of total hysterectomy, cervical cancer, or abnormal pap smears in past  Pap Smear: 10/10/2019    Screening Not Indicated   Hepatitis C Screening Once for adults born between 1945 and 1965  More frequently in patients at high risk for Hepatitis C Hep C Antibody: 07/17/2017    Screening Current   Diabetes Screening 1-2 times per year if you're at risk for diabetes or have pre-diabetes Fasting glucose: No results in last 5 years   A1C: 6 9 % of total Hgb    Screening Not Indicated  History Diabetes   Cholesterol Screening Once every 5 years if you don't have a lipid disorder  May order more often based on risk factors  Lipid panel: 02/09/2021    Screening Not Indicated  History Lipid Disorder     Other Preventive Screenings Covered by Medicare:  1  Abdominal Aortic Aneurysm (AAA) Screening: covered once if your at risk  You're considered to be at risk if you have a family history of AAA  2  Lung Cancer Screening: covers low dose CT scan once per year if you meet all of the following conditions: (1) Age 50-69; (2) No signs or symptoms of lung cancer; (3) Current smoker or have quit smoking within the last 15 years; (4) You have a tobacco smoking history of at least 30 pack years (packs per day multiplied by number of years you smoked); (5) You get a written order from a healthcare provider  3  Glaucoma Screening: covered annually if you're considered high risk: (1) You have diabetes OR (2) Family history of glaucoma OR (3)  aged 48 and older OR (3)  American aged 72 and older  3  Osteoporosis Screening: covered every 2 years if you meet one of the following conditions: (1) You're estrogen deficient and at risk for osteoporosis based off medical history and other findings; (2) Have a vertebral abnormality; (3) On glucocorticoid therapy for more than 3 months; (4) Have primary hyperparathyroidism; (5) On osteoporosis medications and need to assess response to drug therapy  · Last bone density test (DXA Scan): 01/18/2021   5  HIV Screening: covered annually if you're between the age of 15-65  Also covered annually if you are younger than 13 and older than 72 with risk factors for HIV infection   For pregnant patients, it is covered up to 3 times per pregnancy  Immunizations:  Immunization Recommendations   Influenza Vaccine Annual influenza vaccination during flu season is recommended for all persons aged >= 6 months who do not have contraindications   Pneumococcal Vaccine (Prevnar and Pneumovax)  * Prevnar = PCV13  * Pneumovax = PPSV23   Adults 25-60 years old: 1-3 doses may be recommended based on certain risk factors  Adults 72 years old: Prevnar (PCV13) vaccine recommended followed by Pneumovax (PPSV23) vaccine  If already received PPSV23 since turning 65, then PCV13 recommended at least one year after PPSV23 dose  Hepatitis B Vaccine 3 dose series if at intermediate or high risk (ex: diabetes, end stage renal disease, liver disease)   Tetanus (Td) Vaccine - COST NOT COVERED BY MEDICARE PART B Following completion of primary series, a booster dose should be given every 10 years to maintain immunity against tetanus  Td may also be given as tetanus wound prophylaxis  Tdap Vaccine - COST NOT COVERED BY MEDICARE PART B Recommended at least once for all adults  For pregnant patients, recommended with each pregnancy  Shingles Vaccine (Shingrix) - COST NOT COVERED BY MEDICARE PART B  2 shot series recommended in those aged 48 and above     Health Maintenance Due:      Topic Date Due    Breast Cancer Screening: Mammogram  10/16/2022    Colorectal Cancer Screening  11/02/2025    Hepatitis C Screening  Completed     Immunizations Due:      Topic Date Due    COVID-19 Vaccine (1) Never done    Influenza Vaccine (1) 09/01/2021     Advance Directives   What are advance directives? Advance directives are legal documents that state your wishes and plans for medical care  These plans are made ahead of time in case you lose your ability to make decisions for yourself  Advance directives can apply to any medical decision, such as the treatments you want, and if you want to donate organs     What are the types of advance directives? There are many types of advance directives, and each state has rules about how to use them  You may choose a combination of any of the following:  · Living will: This is a written record of the treatment you want  You can also choose which treatments you do not want, which to limit, and which to stop at a certain time  This includes surgery, medicine, IV fluid, and tube feedings  · Durable power of  for healthcare [facility]): This is a written record that states who you want to make healthcare choices for you when you are unable to make them for yourself  This person, called a proxy, is usually a family member or a friend  You may choose more than 1 proxy  · Do not resuscitate (DNR) order:  A DNR order is used in case your heart stops beating or you stop breathing  It is a request not to have certain forms of treatment, such as CPR  A DNR order may be included in other types of advance directives  · Medical directive: This covers the care that you want if you are in a coma, near death, or unable to make decisions for yourself  You can list the treatments you want for each condition  Treatment may include pain medicine, surgery, blood transfusions, dialysis, IV or tube feedings, and a ventilator (breathing machine)  · Values history: This document has questions about your views, beliefs, and how you feel and think about life  This information can help others choose the care that you would choose  Why are advance directives important? An advance directive helps you control your care  Although spoken wishes may be used, it is better to have your wishes written down  Spoken wishes can be misunderstood, or not followed  Treatments may be given even if you do not want them  An advance directive may make it easier for your family to make difficult choices about your care  Urinary Incontinence   Urinary incontinence (UI)  is when you lose control of your bladder   UI develops because your bladder cannot store or empty urine properly  The 3 most common types of UI are stress incontinence, urge incontinence, or both  Medicines:   · May be given to help strengthen your bladder control  Report any side effects of medication to your healthcare provider  Do pelvic muscle exercises often:  Your pelvic muscles help you stop urinating  Squeeze these muscles tight for 5 seconds, then relax for 5 seconds  Gradually work up to squeezing for 10 seconds  Do 3 sets of 15 repetitions a day, or as directed  This will help strengthen your pelvic muscles and improve bladder control  Train your bladder:  Go to the bathroom at set times, such as every 2 hours, even if you do not feel the urge to go  You can also try to hold your urine when you feel the urge to go  For example, hold your urine for 5 minutes when you feel the urge to go  As that becomes easier, hold your urine for 10 minutes  Self-care:   · Keep a UI record  Write down how often you leak urine and how much you leak  Make a note of what you were doing when you leaked urine  · Drink liquids as directed  You may need to limit the amount of liquid you drink to help control your urine leakage  Do not drink any liquid right before you go to bed  Limit or do not have drinks that contain caffeine or alcohol  · Prevent constipation  Eat a variety of high-fiber foods  Good examples are high-fiber cereals, beans, vegetables, and whole-grain breads  Walking is the best way to trigger your intestines to have a bowel movement  · Exercise regularly and maintain a healthy weight  Weight loss and exercise will decrease pressure on your bladder and help you control your leakage  · Use a catheter as directed  to help empty your bladder  A catheter is a tiny, plastic tube that is put into your bladder to drain your urine  · Go to behavior therapy as directed  Behavior therapy may be used to help you learn to control your urge to urinate      Weight Management   Why it is important to manage your weight:  Being overweight increases your risk of health conditions such as heart disease, high blood pressure, type 2 diabetes, and certain types of cancer  It can also increase your risk for osteoarthritis, sleep apnea, and other respiratory problems  Aim for a slow, steady weight loss  Even a small amount of weight loss can lower your risk of health problems  How to lose weight safely:  A safe and healthy way to lose weight is to eat fewer calories and get regular exercise  You can lose up about 1 pound a week by decreasing the number of calories you eat by 500 calories each day  Healthy meal plan for weight management:  A healthy meal plan includes a variety of foods, contains fewer calories, and helps you stay healthy  A healthy meal plan includes the following:  · Eat whole-grain foods more often  A healthy meal plan should contain fiber  Fiber is the part of grains, fruits, and vegetables that is not broken down by your body  Whole-grain foods are healthy and provide extra fiber in your diet  Some examples of whole-grain foods are whole-wheat breads and pastas, oatmeal, brown rice, and bulgur  · Eat a variety of vegetables every day  Include dark, leafy greens such as spinach, kale, risa greens, and mustard greens  Eat yellow and orange vegetables such as carrots, sweet potatoes, and winter squash  · Eat a variety of fruits every day  Choose fresh or canned fruit (canned in its own juice or light syrup) instead of juice  Fruit juice has very little or no fiber  · Eat low-fat dairy foods  Drink fat-free (skim) milk or 1% milk  Eat fat-free yogurt and low-fat cottage cheese  Try low-fat cheeses such as mozzarella and other reduced-fat cheeses  · Choose meat and other protein foods that are low in fat  Choose beans or other legumes such as split peas or lentils   Choose fish, skinless poultry (chicken or turkey), or lean cuts of red meat (beef or pork)  Before you cook meat or poultry, cut off any visible fat  · Use less fat and oil  Try baking foods instead of frying them  Add less fat, such as margarine, sour cream, regular salad dressing and mayonnaise to foods  Eat fewer high-fat foods  Some examples of high-fat foods include french fries, doughnuts, ice cream, and cakes  · Eat fewer sweets  Limit foods and drinks that are high in sugar  This includes candy, cookies, regular soda, and sweetened drinks  Exercise:  Exercise at least 30 minutes per day on most days of the week  Some examples of exercise include walking, biking, dancing, and swimming  You can also fit in more physical activity by taking the stairs instead of the elevator or parking farther away from stores  Ask your healthcare provider about the best exercise plan for you  © Copyright [employer] 2018 Information is for End User's use only and may not be sold, redistributed or otherwise used for commercial purposes   All illustrations and images included in CareNotes® are the copyrighted property of [employer]  or [address] [place] [address]
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
## 2021-09-12 NOTE — ASSESSMENT & PLAN NOTE
Total cholesterol 197, triglycerides 111, HDL 60,   We discussed ASCVD risk score today 16 5%  She has not tolerated atorvastatin in the past   I do recommend statin therapy, even low-dose, low potent statin trial, to reduce cardiovascular events  She is agreeable to try pravastatin 10 mg daily in the evening  We reviewed side effect profile this medication  She will call with any questions or concerns  She will repeat blood work in 3 months

## 2021-09-12 NOTE — ASSESSMENT & PLAN NOTE
H&H 15 8/48  5  She has been evaluated by hematology  She has obstructive sleep apnea with no treatment  She has declined donating blood  Has opted for observation

## 2021-09-12 NOTE — ASSESSMENT & PLAN NOTE
Thyroid ultrasound in October 2020 with multiple thyroid nodules, no nodules met criteria for biopsy or further surveillance

## 2021-09-12 NOTE — ASSESSMENT & PLAN NOTE
Blood pressure is stable 128/82 on amlodipine 10 mg daily, metoprolol tartrate 25 mg every 12 hours, and lisinopril 40 mg daily

## 2021-09-12 NOTE — ASSESSMENT & PLAN NOTE
Lab Results   Component Value Date    HGBA1C 6 9 (H) 07/19/2021     Hemoglobin A1c is stable at 6 9%  Discussed this has been slowly increasing  She is taking Prandin 0 5 mg daily with dinner only  She was unable to tolerate metformin in the past,  Due to GI effects  We discussed importance of increasing activity level and eating low-fat, low-cholesterol, low-carbohydrate diet  Eye exam foot exam are up-to-date

## 2021-09-12 NOTE — ASSESSMENT & PLAN NOTE
DEXA completed January 2021 with osteopenia  Recommend being sure to get 1200 mg of calcium daily, continue vitamin-D supplementation and get regular weight-bearing exercise  Will repeat DEXA in 2 years

## 2021-10-13 ENCOUNTER — HOSPITAL ENCOUNTER (OUTPATIENT)
Dept: MAMMOGRAPHY | Facility: IMAGING CENTER | Age: 66
Discharge: HOME | End: 2021-10-13
Payer: COMMERCIAL

## 2021-10-13 VITALS — BODY MASS INDEX: 33.68 KG/M2 | WEIGHT: 183 LBS | HEIGHT: 62 IN

## 2021-10-13 DIAGNOSIS — I10 ESSENTIAL HYPERTENSION: ICD-10-CM

## 2021-10-13 DIAGNOSIS — Z12.31 VISIT FOR SCREENING MAMMOGRAM: ICD-10-CM

## 2021-10-13 PROCEDURE — 77063 BREAST TOMOSYNTHESIS BI: CPT

## 2021-10-13 PROCEDURE — 77067 SCR MAMMO BI INCL CAD: CPT

## 2021-10-13 PROCEDURE — 4010F ACE/ARB THERAPY RXD/TAKEN: CPT | Performed by: NURSE PRACTITIONER

## 2021-10-13 RX ORDER — LISINOPRIL 40 MG/1
40 TABLET ORAL
Qty: 90 TABLET | Refills: 3 | Status: SHIPPED | OUTPATIENT
Start: 2021-10-13 | End: 2022-07-16

## 2021-10-15 ENCOUNTER — HOSPITAL ENCOUNTER (OUTPATIENT)
Dept: ULTRASOUND IMAGING | Facility: CLINIC | Age: 66
Discharge: HOME | End: 2021-10-15
Payer: COMMERCIAL

## 2021-10-15 VITALS — WEIGHT: 183 LBS | HEIGHT: 62 IN | BODY MASS INDEX: 33.68 KG/M2

## 2021-10-15 DIAGNOSIS — R92.8 ABNORMAL SCREENING MAMMOGRAM: ICD-10-CM

## 2021-10-15 PROCEDURE — 76642 ULTRASOUND BREAST LIMITED: CPT

## 2021-10-20 ENCOUNTER — OFFICE VISIT (OUTPATIENT)
Dept: URGENT CARE | Facility: CLINIC | Age: 66
End: 2021-10-20
Payer: COMMERCIAL

## 2021-10-20 VITALS
TEMPERATURE: 98.2 F | OXYGEN SATURATION: 97 % | HEIGHT: 60 IN | RESPIRATION RATE: 16 BRPM | HEART RATE: 74 BPM | WEIGHT: 183 LBS | BODY MASS INDEX: 35.93 KG/M2

## 2021-10-20 DIAGNOSIS — J02.9 SORE THROAT: ICD-10-CM

## 2021-10-20 DIAGNOSIS — Z11.59 SPECIAL SCREENING EXAMINATION FOR VIRAL DISEASE: ICD-10-CM

## 2021-10-20 DIAGNOSIS — J01.10 ACUTE NON-RECURRENT FRONTAL SINUSITIS: Primary | ICD-10-CM

## 2021-10-20 LAB — S PYO AG THROAT QL: NEGATIVE

## 2021-10-20 PROCEDURE — 87880 STREP A ASSAY W/OPTIC: CPT | Performed by: FAMILY MEDICINE

## 2021-10-20 PROCEDURE — U0003 INFECTIOUS AGENT DETECTION BY NUCLEIC ACID (DNA OR RNA); SEVERE ACUTE RESPIRATORY SYNDROME CORONAVIRUS 2 (SARS-COV-2) (CORONAVIRUS DISEASE [COVID-19]), AMPLIFIED PROBE TECHNIQUE, MAKING USE OF HIGH THROUGHPUT TECHNOLOGIES AS DESCRIBED BY CMS-2020-01-R: HCPCS | Performed by: FAMILY MEDICINE

## 2021-10-20 PROCEDURE — U0005 INFEC AGEN DETEC AMPLI PROBE: HCPCS | Performed by: FAMILY MEDICINE

## 2021-10-20 PROCEDURE — 99213 OFFICE O/P EST LOW 20 MIN: CPT | Performed by: FAMILY MEDICINE

## 2021-10-20 PROCEDURE — S9083 URGENT CARE CENTER GLOBAL: HCPCS | Performed by: FAMILY MEDICINE

## 2021-10-20 PROCEDURE — 87070 CULTURE OTHR SPECIMN AEROBIC: CPT | Performed by: FAMILY MEDICINE

## 2021-10-20 RX ORDER — AMOXICILLIN 500 MG/1
500 CAPSULE ORAL EVERY 12 HOURS SCHEDULED
Qty: 14 CAPSULE | Refills: 0 | Status: SHIPPED | OUTPATIENT
Start: 2021-10-20 | End: 2021-10-25 | Stop reason: ALTCHOICE

## 2021-10-21 LAB — SARS-COV-2 RNA RESP QL NAA+PROBE: NEGATIVE

## 2021-10-22 LAB — BACTERIA THROAT CULT: NORMAL

## 2021-10-25 ENCOUNTER — OFFICE VISIT (OUTPATIENT)
Dept: FAMILY MEDICINE CLINIC | Facility: CLINIC | Age: 66
End: 2021-10-25
Payer: COMMERCIAL

## 2021-10-25 VITALS
BODY MASS INDEX: 36.08 KG/M2 | HEART RATE: 74 BPM | DIASTOLIC BLOOD PRESSURE: 90 MMHG | WEIGHT: 183.8 LBS | HEIGHT: 60 IN | OXYGEN SATURATION: 98 % | RESPIRATION RATE: 20 BRPM | SYSTOLIC BLOOD PRESSURE: 140 MMHG | TEMPERATURE: 98.6 F

## 2021-10-25 DIAGNOSIS — J01.90 ACUTE NON-RECURRENT SINUSITIS, UNSPECIFIED LOCATION: Primary | ICD-10-CM

## 2021-10-25 PROCEDURE — 3725F SCREEN DEPRESSION PERFORMED: CPT | Performed by: NURSE PRACTITIONER

## 2021-10-25 PROCEDURE — 99213 OFFICE O/P EST LOW 20 MIN: CPT | Performed by: NURSE PRACTITIONER

## 2021-10-25 PROCEDURE — 3080F DIAST BP >= 90 MM HG: CPT | Performed by: NURSE PRACTITIONER

## 2021-10-25 PROCEDURE — 3008F BODY MASS INDEX DOCD: CPT | Performed by: NURSE PRACTITIONER

## 2021-10-25 PROCEDURE — 1160F RVW MEDS BY RX/DR IN RCRD: CPT | Performed by: NURSE PRACTITIONER

## 2021-10-25 PROCEDURE — 1036F TOBACCO NON-USER: CPT | Performed by: NURSE PRACTITIONER

## 2021-10-25 PROCEDURE — 3077F SYST BP >= 140 MM HG: CPT | Performed by: NURSE PRACTITIONER

## 2021-10-25 RX ORDER — CEFUROXIME AXETIL 500 MG/1
500 TABLET ORAL EVERY 12 HOURS SCHEDULED
Qty: 20 TABLET | Refills: 0 | Status: SHIPPED | OUTPATIENT
Start: 2021-10-25 | End: 2021-11-04

## 2021-11-01 ENCOUNTER — ANNUAL EXAM (OUTPATIENT)
Dept: OBGYN CLINIC | Facility: CLINIC | Age: 66
End: 2021-11-01
Payer: COMMERCIAL

## 2021-11-01 VITALS
WEIGHT: 186 LBS | SYSTOLIC BLOOD PRESSURE: 116 MMHG | BODY MASS INDEX: 36.52 KG/M2 | HEIGHT: 60 IN | DIASTOLIC BLOOD PRESSURE: 84 MMHG

## 2021-11-01 DIAGNOSIS — N39.3 SUI (STRESS URINARY INCONTINENCE, FEMALE): ICD-10-CM

## 2021-11-01 DIAGNOSIS — Z01.411 ENCOUNTER FOR GYNECOLOGICAL EXAMINATION WITH ABNORMAL FINDING: Primary | ICD-10-CM

## 2021-11-01 DIAGNOSIS — Z12.31 VISIT FOR SCREENING MAMMOGRAM: ICD-10-CM

## 2021-11-01 PROCEDURE — 3008F BODY MASS INDEX DOCD: CPT | Performed by: NURSE PRACTITIONER

## 2021-11-01 PROCEDURE — G0101 CA SCREEN;PELVIC/BREAST EXAM: HCPCS | Performed by: OBSTETRICS & GYNECOLOGY

## 2021-11-23 DIAGNOSIS — I10 ESSENTIAL HYPERTENSION: ICD-10-CM

## 2021-12-03 LAB
ALBUMIN SERPL-MCNC: 4.4 G/DL (ref 3.6–5.1)
ALBUMIN/GLOB SERPL: 1.8 (CALC) (ref 1–2.5)
ALP SERPL-CCNC: 98 U/L (ref 37–153)
ALT SERPL-CCNC: 29 U/L (ref 6–29)
AST SERPL-CCNC: 22 U/L (ref 10–35)
BILIRUB SERPL-MCNC: 0.5 MG/DL (ref 0.2–1.2)
BUN SERPL-MCNC: 16 MG/DL (ref 7–25)
BUN/CREAT SERPL: ABNORMAL (CALC) (ref 6–22)
CALCIUM SERPL-MCNC: 9.5 MG/DL (ref 8.6–10.4)
CHLORIDE SERPL-SCNC: 105 MMOL/L (ref 98–110)
CHOLEST SERPL-MCNC: 191 MG/DL
CHOLEST/HDLC SERPL: 2.9 (CALC)
CO2 SERPL-SCNC: 25 MMOL/L (ref 20–32)
CREAT SERPL-MCNC: 0.87 MG/DL (ref 0.5–0.99)
GLOBULIN SER CALC-MCNC: 2.5 G/DL (CALC) (ref 1.9–3.7)
GLUCOSE SERPL-MCNC: 162 MG/DL (ref 65–99)
HBA1C MFR BLD: 7.1 % OF TOTAL HGB
HDLC SERPL-MCNC: 65 MG/DL
LDLC SERPL CALC-MCNC: 106 MG/DL (CALC)
NONHDLC SERPL-MCNC: 126 MG/DL (CALC)
POTASSIUM SERPL-SCNC: 4.1 MMOL/L (ref 3.5–5.3)
PROT SERPL-MCNC: 6.9 G/DL (ref 6.1–8.1)
SL AMB EGFR AFRICAN AMERICAN: 79 ML/MIN/1.73M2
SL AMB EGFR NON AFRICAN AMERICAN: 68 ML/MIN/1.73M2
SODIUM SERPL-SCNC: 141 MMOL/L (ref 135–146)
TRIGL SERPL-MCNC: 100 MG/DL
TSH SERPL-ACNC: 1.05 MIU/L (ref 0.4–4.5)

## 2021-12-03 PROCEDURE — 3051F HG A1C>EQUAL 7.0%<8.0%: CPT | Performed by: NURSE PRACTITIONER

## 2021-12-06 ENCOUNTER — RA CDI HCC (OUTPATIENT)
Dept: OTHER | Facility: HOSPITAL | Age: 66
End: 2021-12-06

## 2021-12-10 ENCOUNTER — OFFICE VISIT (OUTPATIENT)
Dept: FAMILY MEDICINE CLINIC | Facility: CLINIC | Age: 66
End: 2021-12-10
Payer: COMMERCIAL

## 2021-12-10 VITALS
HEART RATE: 69 BPM | WEIGHT: 184.8 LBS | DIASTOLIC BLOOD PRESSURE: 70 MMHG | TEMPERATURE: 98.3 F | BODY MASS INDEX: 36.28 KG/M2 | SYSTOLIC BLOOD PRESSURE: 142 MMHG | RESPIRATION RATE: 20 BRPM | HEIGHT: 60 IN | OXYGEN SATURATION: 98 %

## 2021-12-10 DIAGNOSIS — E78.5 HYPERLIPIDEMIA, UNSPECIFIED HYPERLIPIDEMIA TYPE: ICD-10-CM

## 2021-12-10 DIAGNOSIS — E11.9 TYPE 2 DIABETES MELLITUS WITHOUT COMPLICATION, WITHOUT LONG-TERM CURRENT USE OF INSULIN (HCC): ICD-10-CM

## 2021-12-10 DIAGNOSIS — I10 BENIGN ESSENTIAL HYPERTENSION: Primary | ICD-10-CM

## 2021-12-10 DIAGNOSIS — E66.01 CLASS 2 SEVERE OBESITY DUE TO EXCESS CALORIES WITH SERIOUS COMORBIDITY AND BODY MASS INDEX (BMI) OF 36.0 TO 36.9 IN ADULT (HCC): ICD-10-CM

## 2021-12-10 DIAGNOSIS — R09.89 RIGHT CAROTID BRUIT: ICD-10-CM

## 2021-12-10 DIAGNOSIS — E55.9 HYPOVITAMINOSIS D: ICD-10-CM

## 2021-12-10 PROCEDURE — 1036F TOBACCO NON-USER: CPT | Performed by: NURSE PRACTITIONER

## 2021-12-10 PROCEDURE — 1160F RVW MEDS BY RX/DR IN RCRD: CPT | Performed by: NURSE PRACTITIONER

## 2021-12-10 PROCEDURE — 3078F DIAST BP <80 MM HG: CPT | Performed by: NURSE PRACTITIONER

## 2021-12-10 PROCEDURE — 99214 OFFICE O/P EST MOD 30 MIN: CPT | Performed by: NURSE PRACTITIONER

## 2021-12-10 PROCEDURE — 3725F SCREEN DEPRESSION PERFORMED: CPT | Performed by: NURSE PRACTITIONER

## 2021-12-10 PROCEDURE — 3077F SYST BP >= 140 MM HG: CPT | Performed by: NURSE PRACTITIONER

## 2021-12-10 PROCEDURE — 3008F BODY MASS INDEX DOCD: CPT | Performed by: NURSE PRACTITIONER

## 2021-12-11 PROBLEM — E66.812 CLASS 2 SEVERE OBESITY DUE TO EXCESS CALORIES WITH SERIOUS COMORBIDITY AND BODY MASS INDEX (BMI) OF 36.0 TO 36.9 IN ADULT (HCC): Status: ACTIVE | Noted: 2018-01-12

## 2021-12-11 PROBLEM — E66.01 CLASS 2 SEVERE OBESITY DUE TO EXCESS CALORIES WITH SERIOUS COMORBIDITY AND BODY MASS INDEX (BMI) OF 36.0 TO 36.9 IN ADULT (HCC): Status: ACTIVE | Noted: 2018-01-12

## 2021-12-13 ENCOUNTER — HOSPITAL ENCOUNTER (OUTPATIENT)
Dept: VASCULAR ULTRASOUND | Facility: HOSPITAL | Age: 66
Discharge: HOME | End: 2021-12-13
Payer: COMMERCIAL

## 2021-12-13 ENCOUNTER — TELEPHONE (OUTPATIENT)
Dept: ADMINISTRATIVE | Facility: OTHER | Age: 66
End: 2021-12-13

## 2021-12-13 DIAGNOSIS — R09.89 RIGHT CAROTID BRUIT: ICD-10-CM

## 2021-12-13 PROCEDURE — 93880 EXTRACRANIAL BILAT STUDY: CPT | Performed by: SURGERY

## 2021-12-13 PROCEDURE — 93880 EXTRACRANIAL BILAT STUDY: CPT

## 2021-12-14 ENCOUNTER — TELEPHONE (OUTPATIENT)
Dept: FAMILY MEDICINE CLINIC | Facility: CLINIC | Age: 66
End: 2021-12-14

## 2022-01-15 DIAGNOSIS — I10 BENIGN ESSENTIAL HYPERTENSION: ICD-10-CM

## 2022-01-17 RX ORDER — AMLODIPINE BESYLATE 10 MG/1
TABLET ORAL
Qty: 90 TABLET | Refills: 3 | Status: SHIPPED | OUTPATIENT
Start: 2022-01-17

## 2022-04-11 ENCOUNTER — OFFICE VISIT (OUTPATIENT)
Dept: CARDIOLOGY CLINIC | Facility: CLINIC | Age: 67
End: 2022-04-11
Payer: COMMERCIAL

## 2022-04-11 VITALS
DIASTOLIC BLOOD PRESSURE: 80 MMHG | HEIGHT: 61 IN | WEIGHT: 187.8 LBS | OXYGEN SATURATION: 98 % | SYSTOLIC BLOOD PRESSURE: 170 MMHG | HEART RATE: 67 BPM | BODY MASS INDEX: 35.45 KG/M2

## 2022-04-11 DIAGNOSIS — E66.01 CLASS 2 SEVERE OBESITY DUE TO EXCESS CALORIES WITH SERIOUS COMORBIDITY AND BODY MASS INDEX (BMI) OF 36.0 TO 36.9 IN ADULT (HCC): ICD-10-CM

## 2022-04-11 DIAGNOSIS — I10 BENIGN ESSENTIAL HYPERTENSION: Primary | ICD-10-CM

## 2022-04-11 DIAGNOSIS — G47.33 OBSTRUCTIVE SLEEP APNEA: ICD-10-CM

## 2022-04-11 DIAGNOSIS — E11.9 TYPE 2 DIABETES MELLITUS WITHOUT COMPLICATION, WITHOUT LONG-TERM CURRENT USE OF INSULIN (HCC): ICD-10-CM

## 2022-04-11 DIAGNOSIS — R07.89 CHEST WALL TENDERNESS: ICD-10-CM

## 2022-04-11 DIAGNOSIS — E78.5 HYPERLIPIDEMIA, UNSPECIFIED HYPERLIPIDEMIA TYPE: ICD-10-CM

## 2022-04-11 DIAGNOSIS — E78.5 DYSLIPIDEMIA: ICD-10-CM

## 2022-04-11 PROCEDURE — 99214 OFFICE O/P EST MOD 30 MIN: CPT | Performed by: INTERNAL MEDICINE

## 2022-04-11 PROCEDURE — 93000 ELECTROCARDIOGRAM COMPLETE: CPT | Performed by: INTERNAL MEDICINE

## 2022-04-11 RX ORDER — PRAVASTATIN SODIUM 20 MG
20 TABLET ORAL
Qty: 90 TABLET | Refills: 3 | Status: SHIPPED | OUTPATIENT
Start: 2022-04-11
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
## 2022-04-14 ENCOUNTER — RA CDI HCC (OUTPATIENT)
Dept: OTHER | Facility: HOSPITAL | Age: 67
End: 2022-04-14

## 2022-04-14 NOTE — PROGRESS NOTES
[address]  coding opportunities       Chart reviewed, no opportunity found: CHART REVIEWED, NO OPPORTUNITY FOUND        Patients Insurance     Medicare Insurance: Medicare

## 2022-04-17 DIAGNOSIS — E11.9 TYPE 2 DIABETES MELLITUS WITHOUT COMPLICATION, WITHOUT LONG-TERM CURRENT USE OF INSULIN (HCC): ICD-10-CM

## 2022-04-17 RX ORDER — REPAGLINIDE 0.5 MG/1
0.5 TABLET ORAL
Qty: 90 TABLET | Refills: 3 | Status: SHIPPED | OUTPATIENT
Start: 2022-04-17

## 2022-04-18 ENCOUNTER — OFFICE VISIT (OUTPATIENT)
Dept: FAMILY MEDICINE CLINIC | Facility: CLINIC | Age: 67
End: 2022-04-18
Payer: COMMERCIAL

## 2022-04-18 VITALS
HEIGHT: 61 IN | BODY MASS INDEX: 35.12 KG/M2 | DIASTOLIC BLOOD PRESSURE: 84 MMHG | OXYGEN SATURATION: 97 % | RESPIRATION RATE: 16 BRPM | HEART RATE: 68 BPM | TEMPERATURE: 97.1 F | SYSTOLIC BLOOD PRESSURE: 160 MMHG | WEIGHT: 186 LBS

## 2022-04-18 DIAGNOSIS — M79.671 PAIN OF RIGHT HEEL: Primary | Chronic | ICD-10-CM

## 2022-04-18 DIAGNOSIS — E78.5 HYPERLIPIDEMIA, UNSPECIFIED HYPERLIPIDEMIA TYPE: ICD-10-CM

## 2022-04-18 DIAGNOSIS — I10 BENIGN ESSENTIAL HYPERTENSION: ICD-10-CM

## 2022-04-18 PROCEDURE — 3079F DIAST BP 80-89 MM HG: CPT | Performed by: NURSE PRACTITIONER

## 2022-04-18 PROCEDURE — 3008F BODY MASS INDEX DOCD: CPT | Performed by: NURSE PRACTITIONER

## 2022-04-18 PROCEDURE — 1160F RVW MEDS BY RX/DR IN RCRD: CPT | Performed by: NURSE PRACTITIONER

## 2022-04-18 PROCEDURE — 1036F TOBACCO NON-USER: CPT | Performed by: NURSE PRACTITIONER

## 2022-04-18 PROCEDURE — 3077F SYST BP >= 140 MM HG: CPT | Performed by: NURSE PRACTITIONER

## 2022-04-18 PROCEDURE — 99214 OFFICE O/P EST MOD 30 MIN: CPT | Performed by: NURSE PRACTITIONER

## 2022-04-18 NOTE — PATIENT INSTRUCTIONS
Plantar Fasciitis Exercises   WHAT YOU NEED TO KNOW:   Plantar fasciitis exercises help stretch your plantar fascia, calf muscles, and Achilles tendon  They also help strengthen the muscles that support your heel and foot  Exercises and stretching can help prevent plantar fasciitis from getting worse or coming back  DISCHARGE INSTRUCTIONS:   Contact your healthcare provider if:   · Your pain and swelling increase  · You develop new knee, hip, or back pain  · You have questions or concerns about your condition or care  How to do plantar fasciitis exercises:  Ask your healthcare provider when to start these exercises and how often to do them  · Slant board stretch:  Stand on a slanted board with your toes higher than your heel  Press your heel into the board  Keep your knee slightly bent  Hold this position for 1 minute  Repeat 5 times  · Heel stretch:  Stand up straight with your hands on a wall  Place your injured leg slightly behind your other leg  Keep your heels flat on the floor, lean forward, and bend both knees  Hold for 30 seconds  · Calf stretch:  Stand up straight with your hands on a wall  Step forward so that your uninjured foot is in front of your injured foot  Keep your front leg bent and your back leg straight  Gently lean forward until you feel your calf stretch  Hold for 30 seconds and then relax  · Seated plantar fascia stretch:  Sit on a firm surface, such as the floor or a mat  Extend your legs out in front of you  Raise your injured foot a few inches off the ground  Keep your leg straight  Grab the toes of your injured foot and pull them toward you  With your other hand, feel your plantar fascia  You should feel it press outward  Hold for 30 seconds  If you cannot reach your toes, loop a towel or tie around your foot  Gently pull on the towel or tie and flex your toes toward you  · Heel raises:  Stand on the injured leg   Raise your other leg off the ground  Hold onto a railing or wall for balance  Slowly rise up on the toes of your injured leg  Hold for 5 seconds  Slowly lower your heel to the ground  · Toe curls:  Place a towel on the floor  Put your foot flat on the towel  Grab the towel with your toes by curling them around the towel  Lift the towel up with your toes  · Toe taps:  Sit down and place your foot flat on the floor  Keep your heel on the floor  Point all your toes up toward the ceiling  While the 4 smaller toes are pointed up, bend your big toe down and tap it on the ground  Do 10 to 50 taps  Point all 5 toes up toward the ceiling again  This time keep your big toe pointed up and tap the 4 smaller toes on the ground  Do 10 to 50 taps each time  Follow up with your doctor as directed:  Write down your questions so you remember to ask them during your visits  © Copyright [employer] 2022 Information is for End User's use only and may not be sold, redistributed or otherwise used for commercial purposes  All illustrations and images included in CareNotes® are the copyrighted property of [employer]  or [facility] Samia Quan   The above information is an  only  It is not intended as medical advice for individual conditions or treatments  Talk to your doctor, nurse or pharmacist before following any medical regimen to see if it is safe and effective for you

## 2022-04-18 NOTE — PROGRESS NOTES
FAMILY PRACTICE OFFICE VISIT       NAME: Gabe Rivas  AGE: 74 y o  SEX: female       : 1955        MRN: [id number]    Assessment and Plan   1  Pain of right heel  Comments:  Unclear etiology, ? heel spur or plantar fasciitis  Will complete x-ray  May trial stretches, icing  Handout provided for stretches  Avoid barefeet, poor supporting shoes  Will await x-ray results, for further plan of care  Orders:  -     XR foot 3+ vw right; Future; Expected date: 2022    2  Benign essential hypertension  Comments:  BP elevated in office today  Usually 140's/80's at home  May be elevated today due to advil use recently  Will monitor  Continue current meds  3  Hyperlipidemia, unspecified hyperlipidemia type  Comments:  Recently started on pravastatin by her cardiologist, Dr Vieira Payor  Thus far tolerating well  Due for repeat blood work in   Has prescriptions  Repeat blood work and follow-up in the office in   Chief Complaint     Chief Complaint   Patient presents with    Foot Pain     Pt is here for rt heel pain 1 + wk  DM foot       History of Present Illness     Gabe Rivas is a 74year old female presenting today for heel pain  BP running 140's/70's at home  Right heel pain started 1 week ago  Initially intermittent  Then constant  Took Ibuprofen  Last week couldn't walk on it  Started wearing sneakers, and it seems to be helping  When resting, no pain  Pain exacerbated by walking  No increase in activity level  Trying to do some walking for exercise  No redness or swelling of foot  Usually wears slippers or bare feet at home  Alpesh Rings about 3 weeks ago, landed on this foot on concrete, missed a step  Review of Systems   Review of Systems   Constitutional: Negative  Musculoskeletal: Positive for arthralgias (right heel pain as noted in HPI)         I have reviewed the patient's medical history in detail; there are no changes to the history as noted in the electronic medical record  Objective     Vitals:    04/18/22 0721 04/18/22 0810   BP: 160/90 160/84   Pulse: 68    Resp: 16    Temp: (!) 97 1 °F (36 2 °C)    TempSrc: Temporal    SpO2: 97%    Weight: 84 4 kg (186 lb)    Height: 5' 1" (1 549 m)      Wt Readings from Last 3 Encounters:   04/18/22 84 4 kg (186 lb)   04/11/22 85 2 kg (187 lb 12 8 oz)   12/10/21 83 8 kg (184 lb 12 8 oz)     Physical Exam  Vitals and nursing note reviewed  Constitutional:       Appearance: Normal appearance  Cardiovascular:      Rate and Rhythm: Normal rate and regular rhythm  Pulses: no weak pulses          Dorsalis pedis pulses are 2+ on the right side and 2+ on the left side  Posterior tibial pulses are 2+ on the right side and 2+ on the left side  Heart sounds: No murmur heard  Pulmonary:      Effort: Pulmonary effort is normal  No respiratory distress  Breath sounds: Normal breath sounds  Musculoskeletal:      Comments: Right heel plantar tenderness  No skin changes, redness, swelling  Feet:      Right foot:      Skin integrity: No ulcer, skin breakdown, erythema, warmth, callus or dry skin  Left foot:      Skin integrity: No ulcer, skin breakdown, erythema, warmth, callus or dry skin  Skin:     Findings: No rash  Neurological:      Mental Status: She is alert  Psychiatric:         Mood and Affect: Mood normal           Diabetic Foot Exam    Patient's shoes and socks removed  Right Foot/Ankle   Right Foot Inspection  Skin Exam: skin normal  Skin not intact (right great toe with 2-3mm puncture wound, secondary to dog's claw, no sign of infection), no dry skin, no warmth, no callus, no erythema, no maceration, no abnormal color, no pre-ulcer, no ulcer and no callus  Toe Exam: ROM and strength within normal limits       Sensory   Proprioception: intact  Monofilament testing: intact    Vascular  Capillary refills: < 3 seconds  The right DP pulse is 2+  The right PT pulse is 2+  Left Foot/Ankle  Left Foot Inspection  Skin Exam: skin normal and skin intact  No dry skin, no warmth, no erythema, no maceration, normal color, no pre-ulcer, no ulcer and no callus  Toe Exam: ROM and strength within normal limits  Sensory   Proprioception: intact  Monofilament testing: intact    Vascular  Capillary refills: < 3 seconds  The left DP pulse is 2+  The left PT pulse is 2+  Assign Risk Category  No deformity present  No loss of protective sensation  No weak pulses  Risk: 0    ALLERGIES:  Allergies   Allergen Reactions    Atorvastatin     Sulfa Antibiotics Myalgia    Sulfamethopyrazine      Joint stiffness    Sulfamethoxazole-Trimethoprim Other (See Comments)     Joint pain       Current Medications     Current Outpatient Medications   Medication Sig Dispense Refill    amLODIPine (NORVASC) 10 mg tablet TAKE 1 TABLET BY MOUTH EVERY DAY WITH DINNER 90 tablet 3    Cholecalciferol (VITAMIN D3) 1000 units CAPS Take by mouth      famotidine (PEPCID) 10 mg tablet Take 10 mg by mouth daily as needed       Flaxseed Oil OIL by Does not apply route      fluticasone (FLONASE) 50 mcg/act nasal spray 2 sprays into each nostril daily as needed       Lactobacillus (PROBIOTIC ACIDOPHILUS) CAPS Take 1 tablet daily as supplement   lisinopril (ZESTRIL) 40 mg tablet TAKE 1 TABLET (40 MG TOTAL) BY MOUTH DAILY IN THE EARLY MORNING 90 tablet 3    loratadine (CLARITIN) 10 mg tablet Take 1 tablet by mouth daily as needed      metoprolol tartrate (LOPRESSOR) 25 mg tablet TAKE 1 TABLET (25 MG TOTAL) BY MOUTH EVERY 12 (TWELVE) HOURS 180 tablet 3    pravastatin (PRAVACHOL) 20 mg tablet Take 1 tablet (20 mg total) by mouth daily at bedtime 90 tablet 3    repaglinide (PRANDIN) 0 5 mg tablet TAKE 1 TABLET (0 5 MG TOTAL) BY MOUTH DAILY WITH DINNER 90 tablet 3     No current facility-administered medications for this visit           [facility] Maintenance   Topic Date Due    PT PLAN OF CARE  10/22/2020    Diabetic Foot Exam  03/28/2022    HEMOGLOBIN A1C  06/03/2022    Fall Risk  09/10/2022    Medicare Annual Wellness Visit (AWV)  09/10/2022    Depression Screening  12/10/2022    BMI: Followup Plan  12/11/2022    BMI: Adult  04/18/2023    DM Eye Exam  08/02/2023    Breast Cancer Screening: Mammogram  10/13/2023    Colorectal Cancer Screening  11/02/2025    DTaP,Tdap,and Td Vaccines (2 - Td or Tdap) 07/17/2028    Hepatitis C Screening  Completed    Osteoporosis Screening  Completed    Pneumococcal Vaccine: 65+ Years  Completed    Influenza Vaccine  Completed    COVID-19 Vaccine  Completed    HIB Vaccine  Aged Out    Hepatitis B Vaccine  Aged Out    IPV Vaccine  Aged Out    Hepatitis A Vaccine  Aged Out    Meningococcal ACWY Vaccine  Aged Out    HPV Vaccine  Aged Out     Immunization History   Administered Date(s) Administered    COVID-19 PFIZER VACCINE 0 3 ML IM 04/10/2021, 05/01/2021, 12/16/2021    INFLUENZA 01/05/2016, 10/18/2018    Influenza Quadrivalent Preservative Free 3 years and older IM 10/04/2017    Influenza Split High Dose Preservative Free IM 10/11/2019    Influenza, high dose seasonal 0 7 mL 09/08/2020, 09/10/2021    Influenza, seasonal, injectable 09/01/2014, 01/05/2016, 10/26/2016    Pneumococcal Conjugate 13-Valent 12/18/2018    Pneumococcal Polysaccharide PPV23 07/12/2016, 03/22/2021    Tdap 07/17/2018    Zoster 01/01/2014, 08/30/2016       JOSHUA Manuel

## 2022-04-19 ENCOUNTER — HOSPITAL ENCOUNTER (OUTPATIENT)
Dept: RADIOLOGY | Facility: HOSPITAL | Age: 67
Discharge: HOME | End: 2022-04-19
Payer: COMMERCIAL

## 2022-04-19 DIAGNOSIS — M79.671 PAIN OF RIGHT HEEL: ICD-10-CM

## 2022-04-19 PROCEDURE — 73630 X-RAY EXAM OF FOOT: CPT

## 2022-04-22 ENCOUNTER — TELEPHONE (OUTPATIENT)
Dept: FAMILY MEDICINE CLINIC | Facility: CLINIC | Age: 67
End: 2022-04-22

## 2022-04-22 NOTE — TELEPHONE ENCOUNTER
----- [id number] 4/21/2022  7:35 PM EDT -----  Please let Marylen Loh know her x-ray does show heel spurs  If she is still having pain, I recommend she see a podiatrist, [address] [address], [phone], Dr Prieto Nguyen

## 2022-06-02 ENCOUNTER — OFFICE VISIT (OUTPATIENT)
Dept: PODIATRY | Facility: CLINIC | Age: 67
End: 2022-06-02
Payer: COMMERCIAL

## 2022-06-02 VITALS
HEART RATE: 65 BPM | WEIGHT: 184 LBS | DIASTOLIC BLOOD PRESSURE: 87 MMHG | SYSTOLIC BLOOD PRESSURE: 170 MMHG | BODY MASS INDEX: 34.77 KG/M2

## 2022-06-02 DIAGNOSIS — M72.2 PLANTAR FASCIITIS OF RIGHT FOOT: Primary | ICD-10-CM

## 2022-06-02 PROCEDURE — 3079F DIAST BP 80-89 MM HG: CPT | Performed by: PODIATRIST

## 2022-06-02 PROCEDURE — 99204 OFFICE O/P NEW MOD 45 MIN: CPT | Performed by: PODIATRIST

## 2022-06-02 PROCEDURE — 3077F SYST BP >= 140 MM HG: CPT | Performed by: PODIATRIST

## 2022-06-02 NOTE — PROGRESS NOTES
This patient was seen on 6/2/22  My role is Foot , Ankle, and Wound Specialist    SUBJECTIVE    Chief Complaint:  Heel pain     Patient ID: Sanjeev Baez is a 74 y o  female  Clementine Vivas is here with a new onset Right heel pain for about 2 months without inciting injury  She describes pain in the heel particularly when getting up from rest        The following portions of the patient's history were reviewed and updated as appropriate: allergies, current medications, past family history, past medical history, past social history, past surgical history and problem list     Review of Systems   Constitutional: Negative  HENT: Positive for postnasal drip  Respiratory: Negative  Musculoskeletal: Positive for arthralgias, gait problem and myalgias  OBJECTIVE      /87   Pulse 65   Wt 83 5 kg (184 lb)   BMI 34 77 kg/m²     Foot/Ankle Musculoskeletal Exam    General      Neurological: alert      General additional comments:  I note muscle function of the anterior, posterior, evertor and invertor muscle groups of the lower leg are intact and normal  I note ROM of the ankle joint, subtalar joint, Choparts and Lisfranc's joint complexes and metatarsophalangeal joints are WNL without crepitus nor restrictions bilaterally  I note pain on palpation Right heel at the fascial origin  Physical Exam  Vitals and nursing note reviewed  Constitutional:       General: She is not in acute distress  Appearance: Normal appearance  She is not ill-appearing, toxic-appearing or diaphoretic  HENT:      Head: Normocephalic and atraumatic  Pulmonary:      Effort: Pulmonary effort is normal       Breath sounds: Normal breath sounds     Musculoskeletal:      Comments:  I note muscle function of the anterior, posterior, evertor and invertor muscle groups of the lower leg are intact and normal  I note ROM of the ankle joint, subtalar joint, Choparts and Lisfranc's joint complexes and metatarsophalangeal joints are WNL without crepitus nor restrictions bilaterally  I note pain on palpation Right heel at the fascial origin  Skin:     General: Skin is warm  Capillary Refill: Capillary refill takes less than 2 seconds  Neurological:      General: No focal deficit present  Mental Status: She is alert  Psychiatric:         Mood and Affect: Mood normal              ASSESSMENT     Diagnoses and all orders for this visit:    Plantar fasciitis of right foot         Problem List Items Addressed This Visit        Musculoskeletal and Integument    Plantar fasciitis of right foot - Primary              PLAN    Xray images of foot dated 4/19/22 noting infra- and retro-calcaneal spurs  CMP dated 12/3/21 reviewed noting all labs essentially normal including BUN /creatinine  Will start Mobic 15mg OD x 10 days  I recommended three times a day ice application to the heel and stretching exercises taught  I recommended to abstain from walking without a supportive shoe in the house  I discussed proper shoegear (a cross- type sneaker or workboot that is in good repair)  Will have her return in 4 weeks if not significantly improved will consider corticosteroid injection

## 2022-06-02 NOTE — PATIENT INSTRUCTIONS
Plantar Fasciitis Exercises   WHAT YOU NEED TO KNOW:   Plantar fasciitis exercises help stretch your plantar fascia, calf muscles, and Achilles tendon  They also help strengthen the muscles that support your heel and foot  Exercises and stretching can help prevent plantar fasciitis from getting worse or coming back  DISCHARGE INSTRUCTIONS:   Contact your healthcare provider if:   Your pain and swelling increase  You develop new knee, hip, or back pain  You have questions or concerns about your condition or care  How to do plantar fasciitis exercises:  Ask your healthcare provider when to start these exercises and how often to do them  Slant board stretch:  Stand on a slanted board with your toes higher than your heel  Press your heel into the board  Keep your knee slightly bent  Hold this position for 1 minute  Repeat 5 times  Heel stretch:  Stand up straight with your hands on a wall  Place your injured leg slightly behind your other leg  Keep your heels flat on the floor, lean forward, and bend both knees  Hold for 30 seconds  Calf stretch:  Stand up straight with your hands on a wall  Step forward so that your uninjured foot is in front of your injured foot  Keep your front leg bent and your back leg straight  Gently lean forward until you feel your calf stretch  Hold for 30 seconds and then relax  Seated plantar fascia stretch:  Sit on a firm surface, such as the floor or a mat  Extend your legs out in front of you  Raise your injured foot a few inches off the ground  Keep your leg straight  Grab the toes of your injured foot and pull them toward you  With your other hand, feel your plantar fascia  You should feel it press outward  Hold for 30 seconds  If you cannot reach your toes, loop a towel or tie around your foot  Gently pull on the towel or tie and flex your toes toward you  Heel raises:  Stand on the injured leg  Raise your other leg off the ground   Hold onto a railing or wall for balance  Slowly rise up on the toes of your injured leg  Hold for 5 seconds  Slowly lower your heel to the ground  Toe curls:  Place a towel on the floor  Put your foot flat on the towel  Grab the towel with your toes by curling them around the towel  Lift the towel up with your toes  Toe taps:  Sit down and place your foot flat on the floor  Keep your heel on the floor  Point all your toes up toward the ceiling  While the 4 smaller toes are pointed up, bend your big toe down and tap it on the ground  Do 10 to 50 taps  Point all 5 toes up toward the ceiling again  This time keep your big toe pointed up and tap the 4 smaller toes on the ground  Do 10 to 50 taps each time  Follow up with your doctor as directed:  Write down your questions so you remember to ask them during your visits  © Copyright [employer] 2022 Information is for End User's use only and may not be sold, redistributed or otherwise used for commercial purposes  All illustrations and images included in CareNotes® are the copyrighted property of A [employer]  or [employer]   The above information is an  only  It is not intended as medical advice for individual conditions or treatments  Talk to your doctor, nurse or pharmacist before following any medical regimen to see if it is safe and effective for you

## 2022-06-03 ENCOUNTER — TELEPHONE (OUTPATIENT)
Dept: OBGYN CLINIC | Facility: HOSPITAL | Age: 67
End: 2022-06-03

## 2022-06-03 LAB
25(OH)D3 SERPL-MCNC: 49 NG/ML (ref 30–100)
ALBUMIN SERPL-MCNC: 4.3 G/DL (ref 3.6–5.1)
ALBUMIN/GLOB SERPL: 1.7 (CALC) (ref 1–2.5)
ALP SERPL-CCNC: 99 U/L (ref 37–153)
ALT SERPL-CCNC: 36 U/L (ref 6–29)
AST SERPL-CCNC: 21 U/L (ref 10–35)
BASOPHILS # BLD AUTO: 47 CELLS/UL (ref 0–200)
BASOPHILS NFR BLD AUTO: 0.8 %
BILIRUB SERPL-MCNC: 0.5 MG/DL (ref 0.2–1.2)
BUN SERPL-MCNC: 18 MG/DL (ref 7–25)
BUN/CREAT SERPL: ABNORMAL (CALC) (ref 6–22)
CALCIUM SERPL-MCNC: 9.6 MG/DL (ref 8.6–10.4)
CHLORIDE SERPL-SCNC: 104 MMOL/L (ref 98–110)
CHOLEST SERPL-MCNC: 190 MG/DL
CHOLEST/HDLC SERPL: 2.9 (CALC)
CO2 SERPL-SCNC: 30 MMOL/L (ref 20–32)
CREAT SERPL-MCNC: 0.9 MG/DL (ref 0.5–0.99)
EOSINOPHIL # BLD AUTO: 148 CELLS/UL (ref 15–500)
EOSINOPHIL NFR BLD AUTO: 2.5 %
ERYTHROCYTE [DISTWIDTH] IN BLOOD BY AUTOMATED COUNT: 12.9 % (ref 11–15)
GLOBULIN SER CALC-MCNC: 2.6 G/DL (CALC) (ref 1.9–3.7)
GLUCOSE SERPL-MCNC: 211 MG/DL (ref 65–99)
HBA1C MFR BLD: 7.8 % OF TOTAL HGB
HCT VFR BLD AUTO: 48.1 % (ref 35–45)
HDLC SERPL-MCNC: 65 MG/DL
HGB BLD-MCNC: 15.5 G/DL (ref 11.7–15.5)
LDLC SERPL CALC-MCNC: 104 MG/DL (CALC)
LYMPHOCYTES # BLD AUTO: 1534 CELLS/UL (ref 850–3900)
LYMPHOCYTES NFR BLD AUTO: 26 %
MCH RBC QN AUTO: 29.2 PG (ref 27–33)
MCHC RBC AUTO-ENTMCNC: 32.2 G/DL (ref 32–36)
MCV RBC AUTO: 90.8 FL (ref 80–100)
MONOCYTES # BLD AUTO: 572 CELLS/UL (ref 200–950)
MONOCYTES NFR BLD AUTO: 9.7 %
NEUTROPHILS # BLD AUTO: 3599 CELLS/UL (ref 1500–7800)
NEUTROPHILS NFR BLD AUTO: 61 %
NONHDLC SERPL-MCNC: 125 MG/DL (CALC)
PLATELET # BLD AUTO: 290 THOUSAND/UL (ref 140–400)
PMV BLD REES-ECKER: 11.9 FL (ref 7.5–12.5)
POTASSIUM SERPL-SCNC: 4.4 MMOL/L (ref 3.5–5.3)
PROT SERPL-MCNC: 6.9 G/DL (ref 6.1–8.1)
RBC # BLD AUTO: 5.3 MILLION/UL (ref 3.8–5.1)
SL AMB EGFR AFRICAN AMERICAN: 76 ML/MIN/1.73M2
SL AMB EGFR NON AFRICAN AMERICAN: 66 ML/MIN/1.73M2
SODIUM SERPL-SCNC: 142 MMOL/L (ref 135–146)
TRIGL SERPL-MCNC: 113 MG/DL
TSH SERPL-ACNC: 1.22 MIU/L (ref 0.4–4.5)
WBC # BLD AUTO: 5.9 THOUSAND/UL (ref 3.8–10.8)

## 2022-06-03 PROCEDURE — 3051F HG A1C>EQUAL 7.0%<8.0%: CPT | Performed by: NURSE PRACTITIONER

## 2022-06-03 NOTE — TELEPHONE ENCOUNTER
Dr Prieto Nguyen    [phone]    Patient states that an anti inflammatory was to be called in yesterday  Please call into [facility] on file

## 2022-06-06 NOTE — TELEPHONE ENCOUNTER
Pt called and informed that Dr Camacho Agarwal is out of the office today and will order medication tomorrow  Pt verbalizes understanding  Dr Camacho Agarwal- please order medication

## 2022-06-07 DIAGNOSIS — M72.2 PLANTAR FASCIITIS OF RIGHT FOOT: Primary | ICD-10-CM

## 2022-06-07 RX ORDER — MELOXICAM 15 MG/1
15 TABLET ORAL DAILY
Qty: 10 TABLET | Refills: 0 | Status: SHIPPED | OUTPATIENT
Start: 2022-06-07 | End: 2022-06-10 | Stop reason: ALTCHOICE

## 2022-06-10 ENCOUNTER — OFFICE VISIT (OUTPATIENT)
Dept: FAMILY MEDICINE CLINIC | Facility: CLINIC | Age: 67
End: 2022-06-10
Payer: COMMERCIAL

## 2022-06-10 VITALS
HEART RATE: 70 BPM | SYSTOLIC BLOOD PRESSURE: 180 MMHG | RESPIRATION RATE: 18 BRPM | HEIGHT: 61 IN | DIASTOLIC BLOOD PRESSURE: 78 MMHG | WEIGHT: 184 LBS | BODY MASS INDEX: 34.74 KG/M2 | TEMPERATURE: 97.4 F | OXYGEN SATURATION: 98 %

## 2022-06-10 DIAGNOSIS — G47.33 OBSTRUCTIVE SLEEP APNEA: ICD-10-CM

## 2022-06-10 DIAGNOSIS — E11.9 TYPE 2 DIABETES MELLITUS WITHOUT COMPLICATION, WITHOUT LONG-TERM CURRENT USE OF INSULIN (HCC): Primary | ICD-10-CM

## 2022-06-10 DIAGNOSIS — I10 BENIGN ESSENTIAL HYPERTENSION: ICD-10-CM

## 2022-06-10 DIAGNOSIS — E55.9 HYPOVITAMINOSIS D: ICD-10-CM

## 2022-06-10 DIAGNOSIS — D58.2 ELEVATED HEMOGLOBIN (HCC): ICD-10-CM

## 2022-06-10 DIAGNOSIS — E78.5 HYPERLIPIDEMIA, UNSPECIFIED HYPERLIPIDEMIA TYPE: ICD-10-CM

## 2022-06-10 PROCEDURE — 1036F TOBACCO NON-USER: CPT | Performed by: NURSE PRACTITIONER

## 2022-06-10 PROCEDURE — 3008F BODY MASS INDEX DOCD: CPT | Performed by: NURSE PRACTITIONER

## 2022-06-10 PROCEDURE — 1160F RVW MEDS BY RX/DR IN RCRD: CPT | Performed by: NURSE PRACTITIONER

## 2022-06-10 PROCEDURE — 99214 OFFICE O/P EST MOD 30 MIN: CPT | Performed by: NURSE PRACTITIONER

## 2022-06-10 RX ORDER — METOPROLOL TARTRATE 50 MG/1
50 TABLET, FILM COATED ORAL EVERY 12 HOURS SCHEDULED
Qty: 180 TABLET | Refills: 1 | Status: SHIPPED | OUTPATIENT
Start: 2022-06-10

## 2022-06-10 RX ORDER — SEMAGLUTIDE 1.34 MG/ML
INJECTION, SOLUTION SUBCUTANEOUS
Status: CANCELLED | OUTPATIENT
Start: 2022-06-10

## 2022-06-10 NOTE — PROGRESS NOTES
FAMILY PRACTICE OFFICE VISIT       NAME: Jerene Nissen  AGE: 74 y o  SEX: female       : 1955        MRN: [id number]    Assessment and Plan   1  Type 2 diabetes mellitus without complication, without long-term current use of insulin (HCC)  Assessment & Plan:    Lab Results   Component Value Date    HGBA1C 7 8 (H) 2022     Hemoglobin A1c trending up, now up to 7 8%  Currently taking Prandin only once per day with dinner  Agreeable to try GLP-1  Will consult clinical pharmacist, Kenisha Mathew, for education and monitoring  Return to office in 3 months  Orders:  -     Ambulatory referral to clinical pharmacy; Future  -     Hemoglobin A1C; Future; Expected date: 09/10/2022    2  Benign essential hypertension  Assessment & Plan:  BP is above goal    Increase metoprolol tartrate from 25 mg BID to 50 mg BID  Continue amlodipine and lisinopril  Keep BP log at home  Return in 3 months for BP check  Orders:  -     metoprolol tartrate (LOPRESSOR) 50 mg tablet; Take 1 tablet (50 mg total) by mouth every 12 (twelve) hours  -     CBC; Future; Expected date: 09/10/2022  -     Comprehensive metabolic panel; Future; Expected date: 2022    3  Obstructive sleep apnea  Assessment & Plan:  Unable to tolerate CPAP  Does not wish to pursue any further  4  Hyperlipidemia, unspecified hyperlipidemia type  Assessment & Plan:    Pravastatin recently increased to 20 mg daily  Will monitor  Walk for exercise  Continue diabetic diet  Orders:  -     Lipid panel; Future; Expected date: 09/10/2022    5  Elevated hemoglobin (HCC)  Assessment & Plan:  Previous work up by hematology  Attributed to ERNESTINE, which she does not desire to pursue treatment  6  Hypovitaminosis D  Assessment & Plan:  Vitamin D level is normal  Continue current vitamin D supplement  Repeat blood work and follow up in 3 months       Chief Complaint     Chief Complaint   Patient presents with   [facility] Follow-up     Pt is here for 6 mos f/u       History of Present Illness     Lori Rodrigues is a 74year old female presenting today for follow up on chronic conditions  Pravastatin increased from 10 mg daily to 20 mg daily by cardiology  Plantar fasciitis  Seeing Podiatry  Doesn't want to take Meloxicam for foot pain due due to side effects  Using ibuprofen  Recommended supportive shoes, ice, meloxicam    Not effective  Using warm soaks, helping  Bought new sneakers  Less active due to foot pain  Eating 2 meals per day  Lunch, dinner  Eating more bread than she usually does  Has episodes sometime in the evenings around 10 pm feeling shaky  Eats a bowl of cereal    Feels better with cereal, never checks sugars when this happens  No family history fo thyroid cancer  No pancreatitis  Using advil cold and sinus or coricidin HBP , last 2 days  Ibuprofen at bedtime sometimes  Home Blood 142/78 yesterday  Does get headaches when BP high  Review of Systems   Review of Systems   Constitutional: Negative  HENT: Negative  Respiratory: Negative  Cardiovascular: Negative  Gastrointestinal: Negative  Genitourinary: Negative  Musculoskeletal:        Plantar fasciitis as noted in HPI, right foot  Skin: Negative  Neurological: Negative  Hematological: Negative  Psychiatric/Behavioral: Negative  I have reviewed the patient's medical history in detail; there are no changes to the history as noted in the electronic medical record  Objective     Vitals:    06/10/22 0748 06/10/22 0848   BP: 160/90 (!) 180/78   Pulse: 70    Resp: 18    Temp: (!) 97 4 °F (36 3 °C)    TempSrc: Temporal    SpO2: 98%    Weight: 83 5 kg (184 lb)    Height: 5' 1" (1 549 m)      Wt Readings from Last 3 Encounters:   06/10/22 83 5 kg (184 lb)   06/02/22 83 5 kg (184 lb)   04/18/22 84 4 kg (186 lb)     Physical Exam  Vitals and nursing note reviewed     Constitutional: General: She is not in acute distress  Appearance: Normal appearance  She is not ill-appearing  HENT:      Head: Normocephalic and atraumatic  Right Ear: Tympanic membrane normal       Left Ear: Tympanic membrane normal       Mouth/Throat:      Mouth: Mucous membranes are moist       Pharynx: Oropharynx is clear  Eyes:      Conjunctiva/sclera: Conjunctivae normal    Neck:      Thyroid: No thyromegaly  Vascular: No carotid bruit  Cardiovascular:      Rate and Rhythm: Normal rate and regular rhythm  Heart sounds: No murmur heard  Pulmonary:      Effort: Pulmonary effort is normal       Breath sounds: Normal breath sounds  Musculoskeletal:      Cervical back: Normal range of motion and neck supple  Right lower leg: No edema  Left lower leg: No edema  Lymphadenopathy:      Cervical: No cervical adenopathy  Neurological:      Mental Status: She is alert  Psychiatric:         Mood and Affect: Mood normal             ALLERGIES:  Allergies   Allergen Reactions    Atorvastatin     Sulfa Antibiotics Myalgia    Sulfamethopyrazine      Joint stiffness    Sulfamethoxazole-Trimethoprim Other (See Comments)     Joint pain       Current Medications     Current Outpatient Medications   Medication Sig Dispense Refill    amLODIPine (NORVASC) 10 mg tablet TAKE 1 TABLET BY MOUTH EVERY DAY WITH DINNER 90 tablet 3    Cholecalciferol (VITAMIN D3) 1000 units CAPS Take by mouth      famotidine (PEPCID) 10 mg tablet Take 10 mg by mouth daily as needed       Flaxseed Oil OIL by Does not apply route      fluticasone (FLONASE) 50 mcg/act nasal spray 2 sprays into each nostril daily as needed       Lactobacillus (PROBIOTIC ACIDOPHILUS) CAPS Take 1 tablet daily as supplement        lisinopril (ZESTRIL) 40 mg tablet TAKE 1 TABLET (40 MG TOTAL) BY MOUTH DAILY IN THE EARLY MORNING 90 tablet 3    loratadine (CLARITIN) 10 mg tablet Take 1 tablet by mouth daily as needed      metoprolol tartrate (LOPRESSOR) 50 mg tablet Take 1 tablet (50 mg total) by mouth every 12 (twelve) hours 180 tablet 1    pravastatin (PRAVACHOL) 20 mg tablet Take 1 tablet (20 mg total) by mouth daily at bedtime 90 tablet 3    repaglinide (PRANDIN) 0 5 mg tablet TAKE 1 TABLET (0 5 MG TOTAL) BY MOUTH DAILY WITH DINNER 90 tablet 3     No current facility-administered medications for this visit           Health Maintenance     Health Maintenance   Topic Date Due    PT PLAN OF CARE  10/22/2020    COVID-19 Vaccine (4 - Booster for Pfizer series) 04/16/2022    Fall Risk  09/10/2022    Medicare Annual Wellness Visit (AWV)  09/10/2022    Depression Screening  12/10/2022    BMI: Followup Plan  12/11/2022    HEMOGLOBIN A1C  12/02/2022    Diabetic Foot Exam  04/18/2023    BMI: Adult  06/10/2023    DM Eye Exam  08/02/2023    Breast Cancer Screening: Mammogram  10/13/2023    Colorectal Cancer Screening  11/02/2025    DTaP,Tdap,and Td Vaccines (2 - Td or Tdap) 07/17/2028    Hepatitis C Screening  Completed    Osteoporosis Screening  Completed    Pneumococcal Vaccine: 65+ Years  Completed    Influenza Vaccine  Completed    HIB Vaccine  Aged Out    Hepatitis B Vaccine  Aged Out    IPV Vaccine  Aged Out    Hepatitis A Vaccine  Aged Out    Meningococcal ACWY Vaccine  Aged Out    HPV Vaccine  Aged Out     Immunization History   Administered Date(s) Administered    COVID-19 PFIZER VACCINE 0 3 ML IM 04/10/2021, 05/01/2021, 12/16/2021    INFLUENZA 01/05/2016, 10/18/2018    Influenza Quadrivalent Preservative Free 3 years and older IM 10/04/2017    Influenza Split High Dose Preservative Free IM 10/11/2019    Influenza, high dose seasonal 0 7 mL 09/08/2020, 09/10/2021    Influenza, seasonal, injectable 09/01/2014, 01/05/2016, 10/26/2016    Pneumococcal Conjugate 13-Valent 12/18/2018    Pneumococcal Polysaccharide PPV23 07/12/2016, 03/22/2021    Tdap 07/17/2018    Zoster 01/01/2014, 08/30/2016       Ariadna Redd, CRNP

## 2022-06-10 NOTE — ASSESSMENT & PLAN NOTE
Lab Results   Component Value Date    HGBA1C 7 8 (H) 06/02/2022     Hemoglobin A1c trending up, now up to 7 8%  Currently taking Prandin only once per day with dinner  Agreeable to try GLP-1  Will consult clinical pharmacist, Pressley Schaumann, for education and monitoring  Return to office in 3 months

## 2022-06-10 NOTE — ASSESSMENT & PLAN NOTE
Pravastatin recently increased to 20 mg daily  Will monitor  Walk for exercise  Continue diabetic diet

## 2022-06-10 NOTE — ASSESSMENT & PLAN NOTE
Previous work up by hematology  Attributed to ERNESTINE, which she does not desire to pursue treatment

## 2022-06-13 NOTE — ASSESSMENT & PLAN NOTE
BP is above goal    Increase metoprolol tartrate from 25 mg BID to 50 mg BID  Continue amlodipine and lisinopril  Keep BP log at home  Return in 3 months for BP check

## 2022-06-16 ENCOUNTER — TELEPHONE (OUTPATIENT)
Dept: FAMILY MEDICINE CLINIC | Facility: CLINIC | Age: 67
End: 2022-06-16

## 2022-06-16 NOTE — TELEPHONE ENCOUNTER
[address] [facility]  Curry Chaparro, PharmD, BCPS, BCACP     Reason for Outreach: Patient referred to clinical pharmacist by JOSHUA Cisneros for disease management  First outreach attempt to patient to introduce services and schedule an appointment  Patient unavailable at time of call  Left voicemail with contact information for return call  Also sent patient myChart message   Will follow-up

## 2022-06-20 ENCOUNTER — CLINICAL SUPPORT (OUTPATIENT)
Dept: FAMILY MEDICINE CLINIC | Facility: CLINIC | Age: 67
End: 2022-06-20

## 2022-06-20 DIAGNOSIS — E11.9 TYPE 2 DIABETES MELLITUS WITHOUT COMPLICATION, WITHOUT LONG-TERM CURRENT USE OF INSULIN (HCC): Primary | ICD-10-CM

## 2022-06-20 RX ORDER — LANCETS 33 GAUGE
EACH MISCELLANEOUS
Qty: 100 EACH | Status: SHIPPED | OUTPATIENT
Start: 2022-06-20

## 2022-06-20 RX ORDER — BLOOD SUGAR DIAGNOSTIC
STRIP MISCELLANEOUS
Qty: 100 EACH | Status: SHIPPED | OUTPATIENT
Start: 2022-06-20

## 2022-06-20 RX ORDER — LANCING DEVICE/LANCETS
KIT MISCELLANEOUS
Qty: 100 EACH | Status: SHIPPED | OUTPATIENT
Start: 2022-06-20

## 2022-06-20 RX ORDER — DULAGLUTIDE 0.75 MG/.5ML
0.75 INJECTION, SOLUTION SUBCUTANEOUS WEEKLY
Qty: 2 ML | Refills: 1 | Status: SHIPPED | OUTPATIENT
Start: 2022-06-20

## 2022-06-20 RX ORDER — BLOOD-GLUCOSE METER
EACH MISCELLANEOUS
Qty: 1 KIT | Refills: 0 | Status: SHIPPED | OUTPATIENT
Start: 2022-06-20 | End: 2022-08-08

## 2022-06-20 NOTE — PATIENT INSTRUCTIONS
Patient was provided extensive education on Trulicity  administration technique  Ensured patient is aware of the signs, symptoms, and treatment of hypoglycemia and instructed patient to record SMBG readings regularly  Patientt instructed to begin taking 0 75mg SQ weekly, record readings, and bring readings to next visit  Medication Activity:   Trulicity is a GLP-1 receptor agonist    In adults with type 2 diabetes, Trulicity is used to improve blood sugar control and reduce the risk of major adverse cardiovascular events in patients with or without existing cardiovascular disease  Warnings:  Patient denies personal or family history of medullary thyroid carcinoma (MTC) and person history of MEN 2  Discussed risk of thyroid C-cell tumors in rats  Counseled patient regarding the potential risk of MTC and symptoms of thyroid tumors (e g  a mass in the neck, dysphagia, dyspnea, persistent hoarseness)    Device:   Trulicity is supplied as a solution for injection under the skin through a pen  Storing your pens:  Store your pens in the refrigerator until you use them  You can keep a pen at room temperature for 14 days  After that, throw it away  Administration:    Administered once weekly, any time of day, with or without food  Inject Trulicity under the skin of your stomach or upper leg  If the injection is given by someone else, it may be injected into your upper arm  You should inject the whole contents of the single use pen each week  Be sure to rotate your injection sites  Inject your dose:  Wash and dry your hands well  Remove a pen from refrigerator  Look at the medicine window to make sure it is clear and has no color or specks in it  Make sure the pen is locked  Then remove the base cap from the end of the pen and throw the cap away  Hold the base of the pen flat against your skin where you want to inject the medicine  Twist the lock ring to unlock the pen    Press and hold the green button at the end of the pen  You will hear a click  Hold the base of the pen against your skin for 5 to 10 seconds more until you hear a second click  You should be able to see the entire gray plunger in the window  Throw the pen away in a sharps container  Side Effects:  Tell your provider or clinical pharmacist if you experience any of the following side effects:  - nausea - heartburn   - diarrhea - difficulty swallowing   - vomiting - tiredness, loss of energy and strength   - stomach pain or distension or bloating - redness, swelling or itching at the injection site   - loss of appetite    - constipation    - increased burping or flatulence      Disposal:  A sharps container is specially made to safely store injection needles and other sharp objects after they are used  You can buy a sharps container at your local pharmacy  If you don't have a sharps container, you can use a plastic laundry detergent bottle, coffee can or karin jar with a lid  Your sharps container should:  Seal tightly and stand up straight without tipping  Hold objects without leaking, breaking, cracking or letting the sharps push through  Be clearly labeled and easy to dispose     Patient was able to correctly teach back appropriate administration, using demonstration device

## 2022-06-20 NOTE — PROGRESS NOTES
[facility]   Nina Méndez, PharmD, BCPS, BCACP     Janny Anaya is a 74 y o  female who presents in-person for initial clinical pharmacist consult  Reason for visit: diabetes  Plan: The following actions were taken today by the Clinical Pharmacist:   1  Educated patient on symptoms and appropriate treatment of hypoglycemia     Interventions: Recommend provider please consider the following, if in agreeance  1  START Trulicity 5 22 mg SQ weekly   2  Send OneTouch Verio [employer] and supplies  [facility] for PCP signature/concurrence    Follow-up:   Follow-up with pharmacist in 4 weeks  Next PCP visit: 9/12/2022    - Home Monitoring Records: SMBGs    The following items are to be considered at a future visit:   1  Trulicity titration  2  Monitor frequency of hypoglycemia  3  Consider Prandin discontinuation     Chronic Conditions Addressed at this Visit:   Type 2 diabetes mellitus: Goals - A1c: <7%; SMBGs: Preprandial: 80-130mg/dL and Postprandial: <180mg/dL per ADA Guidelines  - Most recent A1c: above goal    - SMBG: unknown  Current DM Regimen:   Repaglinide 0 5 mg QPM  MEDICATIONS: Will start GLP-1 RA to assist with glycemic control as well as weight loss  Patient initially nervous about needle so preferred Trulicity  HOME MONITORING: Check fasting blood sugars 2-3 times weekly, and anytime symptomatic and record  Benign essential hypertension: BP goal: <130/80 mmHg based on ADA Guidelines [10-year ASCVD risk ?15%]  - In-office BP above goal, HR acceptable   MEDICATIONS: amlodipine 10 mg daily, lisinopril 40 mg daily, metoprolol 50 mg BID (recently increased by Cardio)  Component of white-coat HTN, worsened by stress  Continue Cardiology follow-up   LABS: labs are reviewed, up to date and normal  Serum K+, Na+, SCr WNL  Hyperlipidemia: 2018 ACC/AHA blood cholesterol guidelines recommends moderate/high-intensity statin   Patient tolerating low-intensity statin well without side effects   MEDICATIONS: pravastatin 20 mg daily  Continue as prescribed  History of myalgias with atorvastatin in the past    LABS: most recent lipid panel reviewed, showing 10-year ASCVD risk > 7 5%  Medication Reconciliation: Medication list reviewed with patient at today's visit and updated to reflect medications patient is currently taking   - Medication adherence is excellent  Education:  - Discussed ABCs of diabetes management (A1c/BP/cholesterol) and goals with patient today  - Provided medication education on Trulicity (see Patient Instructions)    Patient-specific goals:  1  Check BG when symptomatic in the late evening     Subjective:     DM History:  Microvascular complications: none  Cardiovascular complications: none  - Aspirin (Men>50, Women>60): No  - Statin: Yes   - ACEi/ARB: Yes    Previous DM medications:    Metformin (diarrhea)    DM Self-Management:  - Self-monitoring: are not performed  - Hypoglycemia: Patient reports episodes of feeling shaky and hungry that occur around bedtime (9-10pm)  Happen <1x/wk  She attributes it to not eating enough at dinner  She does not check her BG at these times  She eats a bowl of cereal and her symptoms resolve   Glucometer: No   CGM: No     Eating habits:  · Breakfast: does not consistently eat breakfast  Not always hungry  2 cups of coffee with sugar in the AM   · Lunch: Snack/meal  · Dinner: Largest meal of the day  Meat is largest portion  Eats more veggies then fruit  · Snacks: sometimes chips or candy  · Beverages: does not enjoy water  Tries to drink 2-3 glasses/day, mostly at meal times    Exercise habits: She is not active  Current exercise: none  Barriers to improving exercise: MSK issues (OA bilateral knees, plantar fasciitis R foot)    HPI  Medication Adherence: Responsible for medication management: patient  Medication adherence: taking as prescribed  Patient denies missed/extra doses   She uses a pill box  Medication Efficacy/Safety:   Clinically significant drug interactions identified:  None   Side effects from medication(s) reported:  None    Lifestyle:  Social History     Tobacco Use    Smoking status: Never Smoker    Smokeless tobacco: Never Used   Vaping Use    Vaping Use: Never used   Substance Use Topics    Alcohol use: No    Drug use: No        Objective:     Multiple thyroid nodules:  October 2020 - Last thyroid ultrasound, stable thyroid nodules No nodules meet criteria for biopsy  Also does not meet criteria for follow-up ultrasounds    Current Outpatient Medications   Medication Instructions    amLODIPine (NORVASC) 10 mg tablet TAKE 1 TABLET BY MOUTH EVERY DAY WITH DINNER    Cholecalciferol (VITAMIN D3) 1000 units CAPS Oral    famotidine (PEPCID) 10 mg, Oral, Daily PRN    Flaxseed Oil OIL Does not apply    fluticasone (FLONASE) 50 mcg/act nasal spray 2 sprays, Nasal, Daily PRN    Lactobacillus (PROBIOTIC ACIDOPHILUS) CAPS Take 1 tablet daily as supplement   lisinopril (ZESTRIL) 40 mg, Oral, Daily (early morning)    loratadine (CLARITIN) 10 mg tablet 1 tablet, Oral, Daily PRN    metoprolol tartrate (LOPRESSOR) 50 mg, Oral, Every 12 hours scheduled    pravastatin (PRAVACHOL) 20 mg, Oral, Daily at bedtime    repaglinide (PRANDIN) 0 5 mg, Oral, Daily with dinner     I have reviewed the patient's allergies, medications and history as noted in the electronic medical record and updated as necessary  Vital Signs:  BP Readings from Last 3 Encounters:   06/10/22 (!) 180/78   06/02/22 170/87   04/18/22 160/84     Pulse Readings from Last 3 Encounters:   06/10/22 70   06/02/22 65   04/18/22 68      Estimated body mass index is 34 77 kg/m² as calculated from the following:    Height as of 6/10/22: 5' 1" (1 549 m)  Weight as of 6/10/22: 83 5 kg (184 lb)       Pertinent Lab Data:  Lab Results   Component Value Date    SODIUM 142 06/02/2022    K 4 4 06/02/2022     06/02/2022    CO2 30 06/02/2022    BUN 18 06/02/2022    CREATININE 0 90 06/02/2022    GLUC 211 (H) 06/02/2022    CALCIUM 9 6 06/02/2022     Lab Results   Component Value Date    HGBA1C 7 8 (H) 06/02/2022      Preventative Care:  A1c measurement: Next due on or after: 8/31/2022  Dilated eye exam: Up to date; 8/2/2021 - no DR OU  Foot exam: Up to date - no risk of DFE  Dental exam: unknown  Urinary microalbumin measurement: patient on ACEi  Health Maintenance Due   Topic Date Due    PT PLAN OF CARE  10/22/2020    COVID-19 Vaccine (4 - Booster for Rodriguez Peter series) 04/16/2022    Fall Risk  09/10/2022    Medicare Annual Wellness Visit (AWV)  09/10/2022    Depression Screening  12/10/2022    BMI: Followup Plan  12/11/2022     Reason For Outreach  Embedded Pharmacist    Demographics  Interaction Method: Face to Face  Type of Intervention: New    Topic(s) Addressed  Diabetes; Hypertension; Dyslipidemia    Intervention(s) Made    Pharmacologic:    -- Medication Initiation  -- Prevent or Manage Adverse Drug Reaction    Non-Pharmacologic:    -- Disease state education  -- Home monitoring discussed or provided    Tool(s) Used  Not Applicable    Time Spent:   Time Spent in Direct Patient Care: 35 minutes  Time Spent in Care Coordination: 30 minutes    Recommendations  Recipient: Patient/caregiver  Outcome: All Accepted      Vincent LynnD  Clinical Integration Pharmacist  [address]  [email or web address]  [email or web address]

## 2022-07-07 ENCOUNTER — OFFICE VISIT (OUTPATIENT)
Dept: PODIATRY | Facility: CLINIC | Age: 67
End: 2022-07-07
Payer: COMMERCIAL

## 2022-07-07 VITALS — WEIGHT: 183 LBS | BODY MASS INDEX: 34.58 KG/M2

## 2022-07-07 DIAGNOSIS — M72.2 PLANTAR FASCIITIS OF RIGHT FOOT: Primary | ICD-10-CM

## 2022-07-07 PROCEDURE — 20550 NJX 1 TENDON SHEATH/LIGAMENT: CPT | Performed by: PODIATRIST

## 2022-07-07 PROCEDURE — 99213 OFFICE O/P EST LOW 20 MIN: CPT | Performed by: PODIATRIST

## 2022-07-07 RX ORDER — LIDOCAINE HYDROCHLORIDE 10 MG/ML
1 INJECTION, SOLUTION INFILTRATION; PERINEURAL ONCE
Status: COMPLETED | OUTPATIENT
Start: 2022-07-07 | End: 2022-07-07

## 2022-07-07 RX ORDER — TRIAMCINOLONE ACETONIDE 40 MG/ML
40 INJECTION, SUSPENSION INTRA-ARTICULAR; INTRAMUSCULAR ONCE
Status: COMPLETED | OUTPATIENT
Start: 2022-07-07 | End: 2022-07-07

## 2022-07-07 RX ADMIN — LIDOCAINE HYDROCHLORIDE 1 ML: 10 INJECTION, SOLUTION INFILTRATION; PERINEURAL at 11:03

## 2022-07-07 RX ADMIN — TRIAMCINOLONE ACETONIDE 40 MG: 40 INJECTION, SUSPENSION INTRA-ARTICULAR; INTRAMUSCULAR at 11:03

## 2022-07-08 NOTE — PATIENT INSTRUCTIONS
Plantar Fasciitis Exercises   WHAT YOU NEED TO KNOW:   Plantar fasciitis exercises help stretch your plantar fascia, calf muscles, and Achilles tendon  They also help strengthen the muscles that support your heel and foot  Exercises and stretching can help prevent plantar fasciitis from getting worse or coming back  DISCHARGE INSTRUCTIONS:   Contact your healthcare provider if:   Your pain and swelling increase  You develop new knee, hip, or back pain  You have questions or concerns about your condition or care  How to do plantar fasciitis exercises:  Ask your healthcare provider when to start these exercises and how often to do them  Slant board stretch:  Stand on a slanted board with your toes higher than your heel  Press your heel into the board  Keep your knee slightly bent  Hold this position for 1 minute  Repeat 5 times  Heel stretch:  Stand up straight with your hands on a wall  Place your injured leg slightly behind your other leg  Keep your heels flat on the floor, lean forward, and bend both knees  Hold for 30 seconds  Calf stretch:  Stand up straight with your hands on a wall  Step forward so that your uninjured foot is in front of your injured foot  Keep your front leg bent and your back leg straight  Gently lean forward until you feel your calf stretch  Hold for 30 seconds and then relax  Seated plantar fascia stretch:  Sit on a firm surface, such as the floor or a mat  Extend your legs out in front of you  Raise your injured foot a few inches off the ground  Keep your leg straight  Grab the toes of your injured foot and pull them toward you  With your other hand, feel your plantar fascia  You should feel it press outward  Hold for 30 seconds  If you cannot reach your toes, loop a towel or tie around your foot  Gently pull on the towel or tie and flex your toes toward you  Heel raises:  Stand on the injured leg  Raise your other leg off the ground   Hold onto a railing or wall for balance  Slowly rise up on the toes of your injured leg  Hold for 5 seconds  Slowly lower your heel to the ground  Toe curls:  Place a towel on the floor  Put your foot flat on the towel  Grab the towel with your toes by curling them around the towel  Lift the towel up with your toes  Toe taps:  Sit down and place your foot flat on the floor  Keep your heel on the floor  Point all your toes up toward the ceiling  While the 4 smaller toes are pointed up, bend your big toe down and tap it on the ground  Do 10 to 50 taps  Point all 5 toes up toward the ceiling again  This time keep your big toe pointed up and tap the 4 smaller toes on the ground  Do 10 to 50 taps each time  Follow up with your doctor as directed:  Write down your questions so you remember to ask them during your visits  © Copyright [employer] 2022 Information is for End User's use only and may not be sold, redistributed or otherwise used for commercial purposes  All illustrations and images included in CareNotes® are the copyrighted property of A D A M [employer] Inc  or [employer]   The above information is an  only  It is not intended as medical advice for individual conditions or treatments  Talk to your doctor, nurse or pharmacist before following any medical regimen to see if it is safe and effective for you

## 2022-07-14 LAB
BASOPHILS # BLD AUTO: 68 CELLS/UL (ref 0–200)
BASOPHILS NFR BLD AUTO: 1.1 %
EOSINOPHIL # BLD AUTO: 161 CELLS/UL (ref 15–500)
EOSINOPHIL NFR BLD AUTO: 2.6 %
ERYTHROCYTE [DISTWIDTH] IN BLOOD BY AUTOMATED COUNT: 12.8 % (ref 11–15)
HCT VFR BLD AUTO: 46.1 % (ref 35–45)
HGB BLD-MCNC: 15.5 G/DL (ref 11.7–15.5)
LYMPHOCYTES # BLD AUTO: 1773 CELLS/UL (ref 850–3900)
LYMPHOCYTES NFR BLD AUTO: 28.6 %
MCH RBC QN AUTO: 30 PG (ref 27–33)
MCHC RBC AUTO-ENTMCNC: 33.6 G/DL (ref 32–36)
MCV RBC AUTO: 89.3 FL (ref 80–100)
MONOCYTES # BLD AUTO: 682 CELLS/UL (ref 200–950)
MONOCYTES NFR BLD AUTO: 11 %
NEUTROPHILS # BLD AUTO: 3515 CELLS/UL (ref 1500–7800)
NEUTROPHILS NFR BLD AUTO: 56.7 %
PLATELET # BLD AUTO: 292 THOUSAND/UL (ref 140–400)
PMV BLD REES-ECKER: 12 FL (ref 7.5–12.5)
RBC # BLD AUTO: 5.16 MILLION/UL (ref 3.8–5.1)
WBC # BLD AUTO: 6.2 THOUSAND/UL (ref 3.8–10.8)

## 2022-07-15 ENCOUNTER — TELEPHONE (OUTPATIENT)
Dept: FAMILY MEDICINE CLINIC | Facility: CLINIC | Age: 67
End: 2022-07-15

## 2022-07-15 NOTE — TELEPHONE ENCOUNTER
[address] [facility]  Shahid Pomap, PharmD, BCPS, BCACP     Reason for Outreach: Follow-up     First outreach attempt to patient to schedule an appointment  Patient unavailable at time of call  Left voicemail with contact information for return call  Also sent [employer] message   Will follow-up

## 2022-07-16 DIAGNOSIS — I10 ESSENTIAL HYPERTENSION: ICD-10-CM

## 2022-07-16 PROCEDURE — 4010F ACE/ARB THERAPY RXD/TAKEN: CPT | Performed by: PHYSICIAN ASSISTANT

## 2022-07-16 RX ORDER — LISINOPRIL 40 MG/1
40 TABLET ORAL
Qty: 90 TABLET | Refills: 3 | Status: SHIPPED | OUTPATIENT
Start: 2022-07-16

## 2022-07-18 NOTE — TELEPHONE ENCOUNTER
[facility] [address] [facility]   Lucía Osuna, PharmD, BCPS, BCACP     Krystina Welch is a 74 y o  female who presents via telephone for follow-up  Reason for visit: diabetes  This virtual check-in was done via telephone  Encounter provider: Samuel Dickey    Patient agrees to participate in a virtual check in via telephone or video visit instead of presenting to the office to address urgent/immediate medical needs  After connecting, the patient was identified by name and date of birth  Krystnia Welch was informed that this was a telemedicine visit and that the exam was being conducted via [employer] and patient was informed that this is a secure, HIPAA-compliant platform  She agrees to proceed  Patient is located in the following state in which I hold an active license ([place])  My office door was closed  No one else was in the room  Krystina Welch acknowledged consent and understanding of privacy and security of the telemedicine visit  I informed the patient that I have reviewed her record in Epic and presented the opportunity for her to ask any questions regarding the visit today  The patient agreed to participate  Plan:     Interventions: Recommend provider please consider the following, if in agreeance   Continue Trulicity 9 43 mg SQ weekly    Follow-up:   Follow-up with pharmacist PRN  Next PCP visit: 9/12/2022    - Home Monitoring Records: SMBGs    The following items are to be considered at a future visit:   1  Trulicity titration  2  Monitor frequency of hypoglycemia  3  Consider Prandin discontinuation     Chronic Conditions Addressed at this Visit:   Type 2 diabetes mellitus: Goals - A1c: <7%; SMBGs: Preprandial: 80-130mg/dL and Postprandial: <180mg/dL per ADA Guidelines  - Most recent A1c: above goal    - SMBG: at goal, per limited patient report    Current DM Regimen:   Repaglinide 0 5 mg QPM   Trulicity 7 71 mg SQ weekly  MEDICATIONS: Patient reports some mild GI side effects likely related to Trulicity  As fasting SMBGs are at goal, will not titrate GLP-1 RA at this time  Absence of hypoglycemic symptoms is encouraging  If patient continues to tolerate Trulicity, would pursue titration and discontinue Prandin at next PCP visit  HOME MONITORING: Check fasting blood sugars 2-3 times weekly, and anytime symptomatic  Record readings  Patient-specific goals:  1  Check BG when symptomatic in the late evening     Subjective:     Patient has been taking Trulicity 4 02 mg SQ weekly for 5 weeks  Reports some GI side effects, including diarrhea and abdominal bloating  DM History:  Microvascular complications: none  Cardiovascular complications: none  - Aspirin (Men>50, Women>60): No  - Statin: Yes   - ACEi/ARB: Yes    Previous DM medications:    Metformin (diarrhea)    DM Self-Management:  - Self-monitoring: are performed sporadically  Reports fasting readings of 120 and 123  - Hypoglycemia: Denies any episodes since last visit  (+) symptoms - feeling shaky and hungry typically around bedtime (9-10pm)   Glucometer: Yes; OneTouch Verio Flex   CGM: No     HPI  Medication Adherence: Responsible for medication management: patient  Medication adherence: taking as prescribed  Patient denies missed/extra doses  She uses a pill box  Medication Efficacy/Safety:   Clinically significant drug interactions identified:  None   Side effects from medication(s) reported:  GI symptoms likely related to Trulicity    Lifestyle:  Social History     Tobacco Use    Smoking status: Never Smoker    Smokeless tobacco: Never Used   Vaping Use    Vaping Use: Never used   Substance Use Topics    Alcohol use: No    Drug use: No      Objective:     Multiple thyroid nodules:  October 2020 - Last thyroid ultrasound, stable thyroid nodules No nodules meet criteria for biopsy      Also does not meet criteria for follow-up ultrasounds    Current Outpatient Medications Medication Instructions    amLODIPine (NORVASC) 10 mg tablet TAKE 1 TABLET BY MOUTH EVERY DAY WITH DINNER    Blood Glucose Monitoring Suppl (OneTouch Verio Flex System) w/Device KIT Use daily and when having symptoms as directed to check blood sugars    Cholecalciferol (VITAMIN D3) 1000 units CAPS Oral    famotidine (PEPCID) 10 mg, Oral, Daily PRN    Flaxseed Oil OIL Does not apply    fluticasone (FLONASE) 50 mcg/act nasal spray 2 sprays, Nasal, Daily PRN    glucose blood (OneTouch Verio) test strip Use daily and when having symptoms as directed to check blood sugars    Lactobacillus (PROBIOTIC ACIDOPHILUS) CAPS Take 1 tablet daily as supplement   [employer] (ONE TOUCH DELICA LANCING DEV) MISC Use daily and when having symptoms as directed to check blood sugars    Lancets (OneTouch Delica Plus [id number]) MISC Use daily and when having symptoms as directed to check blood sugars    lisinopril (ZESTRIL) 40 mg, Oral, Daily (early morning)    loratadine (CLARITIN) 10 mg tablet 1 tablet, Oral, Daily PRN    metoprolol tartrate (LOPRESSOR) 50 mg, Oral, Every 12 hours scheduled    pravastatin (PRAVACHOL) 20 mg, Oral, Daily at bedtime    repaglinide (PRANDIN) 0 5 mg, Oral, Daily with dinner    Trulicity 0 88 mg, Subcutaneous, Weekly     I have reviewed the patient's allergies, medications and history as noted in the electronic medical record and updated as necessary  Vital Signs:  BP Readings from Last 3 Encounters:   07/22/22 124/74   06/10/22 (!) 180/78   06/02/22 170/87     Pulse Readings from Last 3 Encounters:   06/10/22 70   06/02/22 65   04/18/22 68      Estimated body mass index is 34 58 kg/m² as calculated from the following:    Height as of 6/10/22: 5' 1" (1 549 m)  Weight as of 7/7/22: 83 kg (183 lb)       Pertinent Lab Data:    Lab Results   Component Value Date    HGBA1C 7 8 (H) 06/02/2022      Preventative Care:  A1c measurement: Next due on or after: 8/31/2022  Dilated eye exam: Up to date; 8/2/2021 - no DR OU  Foot exam: Up to date - no risk on DFE (4/18/22)  Dental exam: unknown  Urinary microalbumin measurement: patient on ACEi  Health Maintenance Due   Topic Date Due    PT PLAN OF CARE  10/22/2020    COVID-19 Vaccine (4 - Booster for Pfizer series) 04/16/2022    Influenza Vaccine (1) 09/01/2022    Fall Risk  09/10/2022    Medicare Annual Wellness Visit (AWV)  09/10/2022    Depression Screening  12/10/2022    BMI: Followup Plan  12/11/2022     Pharmacist Tracking Tool  Reason For Outreach: Embedded Pharmacist  Demographics:  Intervention Method: Phone  Type of Intervention: Follow-Up  Topics Addressed: Diabetes  Pharmacologic Interventions: Prevent or Manage ANA MARÍA  Non-Pharmacologic Interventions: Home Monitoring  Time:  Direct Patient Care: 15 mins  Care Coordination: 15 mins  Recommendation Recipient: Patient/Caregiver  Outcome: Accepted        Joce Juan PharmD  Clinical Integration Pharmacist  [address]  [phone]  [email or web address]

## 2022-07-22 ENCOUNTER — OFFICE VISIT (OUTPATIENT)
Dept: HEMATOLOGY ONCOLOGY | Facility: CLINIC | Age: 67
End: 2022-07-22
Payer: COMMERCIAL

## 2022-07-22 VITALS
TEMPERATURE: 97.4 F | SYSTOLIC BLOOD PRESSURE: 124 MMHG | DIASTOLIC BLOOD PRESSURE: 74 MMHG | BODY MASS INDEX: 34.74 KG/M2 | WEIGHT: 184 LBS | OXYGEN SATURATION: 97 % | RESPIRATION RATE: 17 BRPM | HEIGHT: 61 IN | HEART RATE: 73 BPM

## 2022-07-22 DIAGNOSIS — D58.2 ELEVATED HEMOGLOBIN (HCC): Primary | ICD-10-CM

## 2022-07-22 PROCEDURE — 99214 OFFICE O/P EST MOD 30 MIN: CPT | Performed by: PHYSICIAN ASSISTANT

## 2022-07-22 PROCEDURE — 1160F RVW MEDS BY RX/DR IN RCRD: CPT | Performed by: PHYSICIAN ASSISTANT

## 2022-07-22 PROCEDURE — 3074F SYST BP LT 130 MM HG: CPT | Performed by: PHYSICIAN ASSISTANT

## 2022-07-22 PROCEDURE — 3078F DIAST BP <80 MM HG: CPT | Performed by: PHYSICIAN ASSISTANT
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
## 2022-08-03 LAB
LEFT EYE DIABETIC RETINOPATHY: NORMAL
RIGHT EYE DIABETIC RETINOPATHY: NORMAL

## 2022-08-03 PROCEDURE — 2023F DILAT RTA XM W/O RTNOPTHY: CPT | Performed by: PODIATRIST

## 2022-08-08 DIAGNOSIS — E11.9 TYPE 2 DIABETES MELLITUS WITHOUT COMPLICATION, WITHOUT LONG-TERM CURRENT USE OF INSULIN (HCC): ICD-10-CM

## 2022-08-08 RX ORDER — BLOOD-GLUCOSE METER
EACH MISCELLANEOUS
Qty: 1 KIT | Refills: 0 | Status: SHIPPED | OUTPATIENT
Start: 2022-08-08

## 2022-08-12 DIAGNOSIS — E11.9 TYPE 2 DIABETES MELLITUS WITHOUT COMPLICATION, WITHOUT LONG-TERM CURRENT USE OF INSULIN (HCC): ICD-10-CM

## 2022-08-12 RX ORDER — DULAGLUTIDE 0.75 MG/.5ML
INJECTION, SOLUTION SUBCUTANEOUS
Qty: 2 ML | Refills: 1 | Status: SHIPPED | OUTPATIENT
Start: 2022-08-12 | End: 2022-10-06

## 2022-08-22 LAB
ALBUMIN SERPL-MCNC: 4.3 G/DL (ref 3.6–5.1)
ALBUMIN/GLOB SERPL: 1.7 (CALC) (ref 1–2.5)
ALP SERPL-CCNC: 84 U/L (ref 37–153)
ALT SERPL-CCNC: 26 U/L (ref 6–29)
AST SERPL-CCNC: 19 U/L (ref 10–35)
BASOPHILS # BLD AUTO: 72 CELLS/UL (ref 0–200)
BASOPHILS NFR BLD AUTO: 1.2 %
BILIRUB SERPL-MCNC: 0.5 MG/DL (ref 0.2–1.2)
BUN SERPL-MCNC: 15 MG/DL (ref 7–25)
BUN/CREAT SERPL: ABNORMAL (CALC) (ref 6–22)
CALCIUM SERPL-MCNC: 9.9 MG/DL (ref 8.6–10.4)
CHLORIDE SERPL-SCNC: 105 MMOL/L (ref 98–110)
CHOLEST SERPL-MCNC: 171 MG/DL
CHOLEST/HDLC SERPL: 2.6 (CALC)
CO2 SERPL-SCNC: 31 MMOL/L (ref 20–32)
CREAT SERPL-MCNC: 0.96 MG/DL (ref 0.5–1.05)
EOSINOPHIL # BLD AUTO: 186 CELLS/UL (ref 15–500)
EOSINOPHIL NFR BLD AUTO: 3.1 %
ERYTHROCYTE [DISTWIDTH] IN BLOOD BY AUTOMATED COUNT: 12.6 % (ref 11–15)
GFR/BSA.PRED SERPLBLD CYS-BASED-ARV: 64 ML/MIN/1.73M2
GLOBULIN SER CALC-MCNC: 2.5 G/DL (CALC) (ref 1.9–3.7)
GLUCOSE SERPL-MCNC: 113 MG/DL (ref 65–99)
HBA1C MFR BLD: 6.1 % OF TOTAL HGB
HCT VFR BLD AUTO: 47.8 % (ref 35–45)
HDLC SERPL-MCNC: 65 MG/DL
HGB BLD-MCNC: 15.7 G/DL (ref 11.7–15.5)
LDLC SERPL CALC-MCNC: 85 MG/DL (CALC)
LYMPHOCYTES # BLD AUTO: 1830 CELLS/UL (ref 850–3900)
LYMPHOCYTES NFR BLD AUTO: 30.5 %
MCH RBC QN AUTO: 29.6 PG (ref 27–33)
MCHC RBC AUTO-ENTMCNC: 32.8 G/DL (ref 32–36)
MCV RBC AUTO: 90.2 FL (ref 80–100)
MONOCYTES # BLD AUTO: 708 CELLS/UL (ref 200–950)
MONOCYTES NFR BLD AUTO: 11.8 %
NEUTROPHILS # BLD AUTO: 3204 CELLS/UL (ref 1500–7800)
NEUTROPHILS NFR BLD AUTO: 53.4 %
NONHDLC SERPL-MCNC: 106 MG/DL (CALC)
PLATELET # BLD AUTO: 302 THOUSAND/UL (ref 140–400)
PMV BLD REES-ECKER: 12 FL (ref 7.5–12.5)
POTASSIUM SERPL-SCNC: 4.7 MMOL/L (ref 3.5–5.3)
PROT SERPL-MCNC: 6.8 G/DL (ref 6.1–8.1)
RBC # BLD AUTO: 5.3 MILLION/UL (ref 3.8–5.1)
SODIUM SERPL-SCNC: 146 MMOL/L (ref 135–146)
TRIGL SERPL-MCNC: 114 MG/DL
WBC # BLD AUTO: 6 THOUSAND/UL (ref 3.8–10.8)

## 2022-08-22 PROCEDURE — 3044F HG A1C LEVEL LT 7.0%: CPT | Performed by: PODIATRIST

## 2022-08-23 ENCOUNTER — TELEPHONE (OUTPATIENT)
Dept: FAMILY MEDICINE CLINIC | Facility: CLINIC | Age: 67
End: 2022-08-23

## 2022-08-23 ENCOUNTER — OFFICE VISIT (OUTPATIENT)
Dept: PODIATRY | Facility: CLINIC | Age: 67
End: 2022-08-23
Payer: COMMERCIAL

## 2022-08-23 VITALS
WEIGHT: 182.6 LBS | SYSTOLIC BLOOD PRESSURE: 172 MMHG | HEIGHT: 61 IN | HEART RATE: 73 BPM | BODY MASS INDEX: 34.48 KG/M2 | DIASTOLIC BLOOD PRESSURE: 91 MMHG

## 2022-08-23 DIAGNOSIS — M72.2 PLANTAR FASCIITIS OF RIGHT FOOT: Primary | ICD-10-CM

## 2022-08-23 PROCEDURE — 3077F SYST BP >= 140 MM HG: CPT | Performed by: PODIATRIST

## 2022-08-23 PROCEDURE — 1160F RVW MEDS BY RX/DR IN RCRD: CPT | Performed by: PODIATRIST

## 2022-08-23 PROCEDURE — 99213 OFFICE O/P EST LOW 20 MIN: CPT | Performed by: PODIATRIST

## 2022-08-23 PROCEDURE — 3080F DIAST BP >= 90 MM HG: CPT | Performed by: PODIATRIST

## 2022-08-23 NOTE — PROGRESS NOTES
This patient was seen on 8/23/22  My role is Foot , Ankle, and Wound Specialist    SUBJECTIVE    Chief Complaint:  Plantar fasciitis     Patient ID: Dandre Banuelos is a 74 y o  female  Paul Bina is here with Right heel painShe's been stretching, wearing supportive shoes and icing and notes that her pain is now only about 15% of baseline  She had an injection last time  The following portions of the patient's history were reviewed and updated as appropriate: allergies, current medications, past family history, past medical history, past social history, past surgical history and problem list     Review of Systems   Constitutional: Negative  HENT: Negative for postnasal drip  Respiratory: Negative  Musculoskeletal: Positive for arthralgias and myalgias  Negative for gait problem  OBJECTIVE      BP (!) 172/91   Pulse 73   Ht 5' 1" (1 549 m) Comment: verbal  Wt 82 8 kg (182 lb 9 6 oz)   BMI 34 50 kg/m²     Foot/Ankle Musculoskeletal Exam    General      Neurological: alert      General additional comments:  I note muscle function of the anterior, posterior, evertor and invertor muscle groups of the lower leg are intact and normal  I note ROM of the ankle joint, subtalar joint, Choparts and Lisfranc's joint complexes and metatarsophalangeal joints are WNL without crepitus nor restrictions bilaterally  I note minimal pain on palpation Right heel at the fascial origin  Physical Exam  Vitals and nursing note reviewed  Constitutional:       General: She is not in acute distress  Appearance: Normal appearance  She is not ill-appearing, toxic-appearing or diaphoretic  HENT:      Head: Normocephalic and atraumatic  Pulmonary:      Effort: Pulmonary effort is normal       Breath sounds: Normal breath sounds     Musculoskeletal:      Comments:  I note muscle function of the anterior, posterior, evertor and invertor muscle groups of the lower leg are intact and normal  I note ROM of the ankle joint, subtalar joint, Choparts and Lisfranc's joint complexes and metatarsophalangeal joints are WNL without crepitus nor restrictions bilaterally  I note minimal pain on palpation Right heel at the fascial origin  Skin:     General: Skin is warm  Capillary Refill: Capillary refill takes less than 2 seconds  Neurological:      General: No focal deficit present  Mental Status: She is alert  Psychiatric:         Mood and Affect: Mood normal              ASSESSMENT     Diagnoses and all orders for this visit:    Plantar fasciitis of right foot         Problem List Items Addressed This Visit        Musculoskeletal and Integument    Plantar fasciitis of right foot - Primary              PLAN  At this point, I'm going to defer further treatment / injections  She's doing well with her home therapy program     She will return PRN

## 2022-08-23 NOTE — TELEPHONE ENCOUNTER
----- Message from [id number] sent at 8/22/2022 10:12 PM EDT -----  Blood work is stable  A1c is excellent at 6 1%!! Will review at office visit in September

## 2022-09-12 ENCOUNTER — OFFICE VISIT (OUTPATIENT)
Dept: FAMILY MEDICINE CLINIC | Facility: CLINIC | Age: 67
End: 2022-09-12
Payer: COMMERCIAL

## 2022-09-12 VITALS
DIASTOLIC BLOOD PRESSURE: 84 MMHG | HEART RATE: 79 BPM | BODY MASS INDEX: 34.55 KG/M2 | OXYGEN SATURATION: 98 % | TEMPERATURE: 97.1 F | SYSTOLIC BLOOD PRESSURE: 156 MMHG | RESPIRATION RATE: 16 BRPM | WEIGHT: 183 LBS | HEIGHT: 61 IN

## 2022-09-12 DIAGNOSIS — Z00.00 MEDICARE ANNUAL WELLNESS VISIT, SUBSEQUENT: ICD-10-CM

## 2022-09-12 DIAGNOSIS — D58.2 ELEVATED HEMOGLOBIN (HCC): ICD-10-CM

## 2022-09-12 DIAGNOSIS — E11.9 TYPE 2 DIABETES MELLITUS WITHOUT COMPLICATION, WITHOUT LONG-TERM CURRENT USE OF INSULIN (HCC): Primary | ICD-10-CM

## 2022-09-12 DIAGNOSIS — L98.9 SKIN LESION OF BACK: ICD-10-CM

## 2022-09-12 DIAGNOSIS — I10 BENIGN ESSENTIAL HYPERTENSION: ICD-10-CM

## 2022-09-12 DIAGNOSIS — N39.3 STRESS INCONTINENCE: ICD-10-CM

## 2022-09-12 DIAGNOSIS — Z12.31 BREAST CANCER SCREENING BY MAMMOGRAM: ICD-10-CM

## 2022-09-12 DIAGNOSIS — E78.5 HYPERLIPIDEMIA, UNSPECIFIED HYPERLIPIDEMIA TYPE: ICD-10-CM

## 2022-09-12 PROCEDURE — 3725F SCREEN DEPRESSION PERFORMED: CPT | Performed by: NURSE PRACTITIONER

## 2022-09-12 PROCEDURE — G0439 PPPS, SUBSEQ VISIT: HCPCS | Performed by: NURSE PRACTITIONER

## 2022-09-12 PROCEDURE — 1160F RVW MEDS BY RX/DR IN RCRD: CPT | Performed by: NURSE PRACTITIONER

## 2022-09-12 PROCEDURE — 1125F AMNT PAIN NOTED PAIN PRSNT: CPT | Performed by: NURSE PRACTITIONER

## 2022-09-12 PROCEDURE — 3288F FALL RISK ASSESSMENT DOCD: CPT | Performed by: NURSE PRACTITIONER

## 2022-09-12 PROCEDURE — 3077F SYST BP >= 140 MM HG: CPT | Performed by: NURSE PRACTITIONER

## 2022-09-12 PROCEDURE — 99214 OFFICE O/P EST MOD 30 MIN: CPT | Performed by: NURSE PRACTITIONER

## 2022-09-12 PROCEDURE — 3079F DIAST BP 80-89 MM HG: CPT | Performed by: NURSE PRACTITIONER

## 2022-09-12 PROCEDURE — 1090F PRES/ABSN URINE INCON ASSESS: CPT | Performed by: NURSE PRACTITIONER

## 2022-09-12 PROCEDURE — 1170F FXNL STATUS ASSESSED: CPT | Performed by: NURSE PRACTITIONER

## 2022-09-12 PROCEDURE — 1003F LEVEL OF ACTIVITY ASSESS: CPT | Performed by: NURSE PRACTITIONER

## 2022-09-12 NOTE — PROGRESS NOTES
FAMILY PRACTICE OFFICE VISIT       NAME: Jm Mitchell  AGE: 74 y o  SEX: female       : 1955        MRN: [id number]    Assessment and Plan   1  Type 2 diabetes mellitus without complication, without long-term current use of insulin (HCC)  Assessment & Plan:    Lab Results   Component Value Date    HGBA1C 6 1 (H) 2022     A1c down from 7 8% to 6 1%  Continue prandin at dinner time  Continue Trulicity 4 58 mg weekly  Foot exam and eye exam are up to date  Orders:  -     Hemoglobin A1C; Future    2  Benign essential hypertension  Assessment & Plan:  BP elevated today, did not take BP medication yet today  Home 's/80's  Will continue to work on lifestyle modifications, just started walking  Continue same medications for now  Return in 4 months for recheck  Orders:  -     Comprehensive metabolic panel; Future  -     CBC and differential; Future    3  Elevated hemoglobin (HCC)  Assessment & Plan:  Stable  Hematology evaluation in the past, elevated due to untreated ERNESTINE  4  Hyperlipidemia, unspecified hyperlipidemia type  Assessment & Plan:  LDL 85  Continue pravastatin  Repeat blood work in 4 months  Orders:  -     Lipid panel; Future    5  Medicare annual wellness visit, subsequent    6  Breast cancer screening by mammogram  -     Mammo screening bilateral w 3d & cad; Future; Expected date: 2022    7  Stress incontinence  Comments:  Not bothersome, Has addressed with gynecology in the past        8  Skin lesion of back  Comments:  Multiple skin lesions on back, most appear to be seborrhea keratosis, few spots possible actinic keratosis, therefore recommend dermatology evaluation   She will follow up with Dr Kinjal Aviles, as she has seen her in the past         Chief Complaint     Chief Complaint   Patient presents with    Medicare Wellness Visit       History of Present Illness     Jm Mitchell is a 74year old female presenting today for follow up on diabetes and hypertension  Home 's/70-80's  Did not take BP mediaction today yet  Feeling well  Review of Systems   Review of Systems   Constitutional: Negative  HENT: Negative  Respiratory: Negative  Cardiovascular: Negative  Gastrointestinal: Negative  Genitourinary: Negative  Musculoskeletal: Negative  Skin:        Skin lesions on her back she would like to have checked  Neurological: Negative  Psychiatric/Behavioral: Negative  I have reviewed the patient's medical history in detail; there are no changes to the history as noted in the electronic medical record  Objective     Vitals:    09/12/22 0750 09/12/22 0839   BP: 160/90 156/84   Pulse: 79    Resp: 16    Temp: (!) 97 1 °F (36 2 °C)    TempSrc: Temporal    SpO2: 98%    Weight: 83 kg (183 lb)    Height: 5' 1" (1 549 m)      Wt Readings from Last 3 Encounters:   09/12/22 83 kg (183 lb)   08/23/22 82 8 kg (182 lb 9 6 oz)   07/22/22 83 5 kg (184 lb)     Physical Exam  Vitals and nursing note reviewed  Constitutional:       General: She is not in acute distress  Appearance: Normal appearance  She is not ill-appearing  HENT:      Head: Normocephalic and atraumatic  Right Ear: Tympanic membrane normal       Left Ear: Tympanic membrane normal       Mouth/Throat:      Mouth: Mucous membranes are moist       Pharynx: Oropharynx is clear  Neck:      Thyroid: No thyromegaly  Cardiovascular:      Rate and Rhythm: Normal rate and regular rhythm  Heart sounds: No murmur heard  Pulmonary:      Effort: Pulmonary effort is normal  No respiratory distress  Breath sounds: Normal breath sounds  No wheezing or rales  Musculoskeletal:      Cervical back: Normal range of motion and neck supple  Right lower leg: No edema  Left lower leg: No edema  Lymphadenopathy:      Cervical: No cervical adenopathy     Skin:     Comments: Multiple skin lesions on back, appear to be seborrhea keratosis, though there are at least 2 skin lesions that are concerning for actinic keratosis  Neurological:      Mental Status: She is alert  Psychiatric:         Mood and Affect: Mood normal        BMI Counseling: Body mass index is 34 58 kg/m²  The BMI is above normal  Exercise recommendations include exercising 3-5 times per week  Rationale for BMI follow-up plan is due to patient being overweight or obese  Started walking for exercise  Depression Screening and Follow-up Plan: Patient was screened for depression during today's encounter  They screened negative with a PHQ-2 score of 1  Urinary Incontinence Plan of Care: counseling topics discussed: practice Kegel (pelvic floor strengthening) exercises and weight loss  Has discussed with gynecology            ALLERGIES:  Allergies   Allergen Reactions    Atorvastatin     Sulfa Antibiotics Myalgia    Sulfamethopyrazine      Joint stiffness    Sulfamethoxazole-Trimethoprim Other (See Comments)     Joint pain       Current Medications     Current Outpatient Medications   Medication Sig Dispense Refill    amLODIPine (NORVASC) 10 mg tablet TAKE 1 TABLET BY MOUTH EVERY DAY WITH DINNER 90 tablet 3    Blood Glucose Monitoring Suppl (OneTouch Verio Flex System) w/Device KIT USE DAILY AND WHEN HAVING SYMPTOMS AS DIRECTED TO CHECK BLOOD SUGARS 1 kit 0    Cholecalciferol (VITAMIN D3) 1000 units CAPS Take by mouth      famotidine (PEPCID) 10 mg tablet Take 10 mg by mouth daily as needed       Flaxseed Oil OIL by Does not apply route      fluticasone (FLONASE) 50 mcg/act nasal spray 2 sprays into each nostril daily as needed       glucose blood (OneTouch Verio) test strip Use daily and when having symptoms as directed to check blood sugars 100 each PRN    Lactobacillus (PROBIOTIC ACIDOPHILUS) CAPS Take 1 tablet daily as supplement        Lancet Devices (ONE TOUCH DELICA LANCING DEV) MISC Use daily and when having symptoms as directed to check blood sugars 100 each PRN    Lancets (OneTouch Delica Plus [id number]) MISC Use daily and when having symptoms as directed to check blood sugars 100 each PRN    lisinopril (ZESTRIL) 40 mg tablet TAKE 1 TABLET (40 MG TOTAL) BY MOUTH DAILY IN THE EARLY MORNING 90 tablet 3    loratadine (CLARITIN) 10 mg tablet Take 1 tablet by mouth daily as needed      metoprolol tartrate (LOPRESSOR) 50 mg tablet Take 1 tablet (50 mg total) by mouth every 12 (twelve) hours 180 tablet 1    pravastatin (PRAVACHOL) 20 mg tablet Take 1 tablet (20 mg total) by mouth daily at bedtime 90 tablet 3    repaglinide (PRANDIN) 0 5 mg tablet TAKE 1 TABLET (0 5 MG TOTAL) BY MOUTH DAILY WITH DINNER 90 tablet 3    Trulicity 6 64 YM/9 5QI SOPN INJECT 0 5 MILLILITERS UNDER THE SKIN ONE TIME PER WEEK 2 mL 1     No current facility-administered medications for this visit           Health Maintenance     Health Maintenance   Topic Date Due    PT PLAN OF CARE  10/22/2020    COVID-19 Vaccine (4 - Booster for Pfizer series) 04/16/2022    Influenza Vaccine (1) 09/01/2022    BMI: Followup Plan  12/11/2022    HEMOGLOBIN A1C  02/22/2023    Diabetic Foot Exam  04/18/2023    Fall Risk  09/12/2023    Depression Screening  09/12/2023    Urinary Incontinence Screening  09/12/2023    Medicare Annual Wellness Visit (AWV)  09/12/2023    BMI: Adult  09/12/2023    Breast Cancer Screening: Mammogram  10/13/2023    DM Eye Exam  08/03/2024    Colorectal Cancer Screening  11/02/2025    Hepatitis C Screening  Completed    Osteoporosis Screening  Completed    Pneumococcal Vaccine: 65+ Years  Completed    HIB Vaccine  Aged Out    Hepatitis B Vaccine  Aged Out    IPV Vaccine  Aged Out    Hepatitis A Vaccine  Aged Out    Meningococcal ACWY Vaccine  Aged Out    HPV Vaccine  Aged Out     Immunization History   Administered Date(s) Administered    COVID-19 PFIZER VACCINE 0 3 ML IM 04/10/2021, 05/01/2021, 12/16/2021    INFLUENZA 01/05/2016, 10/18/2018    Influenza Quadrivalent Preservative Free 3 years and older IM 10/04/2017    Influenza Split High Dose Preservative Free IM 10/11/2019    Influenza, high dose seasonal 0 7 mL 09/08/2020, 09/10/2021    Influenza, seasonal, injectable 09/01/2014, 01/05/2016, 10/26/2016    Pneumococcal Conjugate 13-Valent 12/18/2018    Pneumococcal Polysaccharide PPV23 07/12/2016, 03/22/2021    Tdap 07/17/2018    Zoster 01/01/2014, 08/30/2016       JOSHUA Velazquez
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
## 2022-09-12 NOTE — ASSESSMENT & PLAN NOTE
Lab Results   Component Value Date    HGBA1C 6 1 (H) 08/22/2022     A1c down from 7 8% to 6 1%  Continue prandin at dinner time  Continue Trulicity 8 53 mg weekly  Foot exam and eye exam are up to date

## 2022-09-12 NOTE — PATIENT INSTRUCTIONS
Medicare Preventive Visit Patient Instructions  Thank you for completing your Welcome to Medicare Visit or Medicare Annual Wellness Visit today  Your next wellness visit will be due in one year (9/13/2023)  The screening/preventive services that you may require over the next 5-10 years are detailed below  Some tests may not apply to you based off risk factors and/or age  Screening tests ordered at today's visit but not completed yet may show as past due  Also, please note that scanned in results may not display below  Preventive Screenings:  Service Recommendations Previous Testing/Comments   Colorectal Cancer Screening  * Colonoscopy    * Fecal Occult Blood Test (FOBT)/Fecal Immunochemical Test (FIT)  * Fecal DNA/Cologuard Test  * Flexible Sigmoidoscopy Age: 39-70 years old   Colonoscopy: every 10 years (may be performed more frequently if at higher risk)  OR  FOBT/FIT: every 1 year  OR  Cologuard: every 3 years  OR  Sigmoidoscopy: every 5 years  Screening may be recommended earlier than age 39 if at higher risk for colorectal cancer  Also, an individualized decision between you and your healthcare provider will decide whether screening between the ages of 74-80 would be appropriate  Colonoscopy: 11/02/2020  FOBT/FIT: Not on file  Cologuard: Not on file  Sigmoidoscopy: Not on file    Screening Current     Breast Cancer Screening Age: 36 years old  Frequency: every 1-2 years  Not required if history of left and right mastectomy Mammogram: 10/13/2021    Screening Current   Cervical Cancer Screening Between the ages of 21-29, pap smear recommended once every 3 years  Between the ages of 33-67, can perform pap smear with HPV co-testing every 5 years     Recommendations may differ for women with a history of total hysterectomy, cervical cancer, or abnormal pap smears in past  Pap Smear: 11/01/2021    Screening Not Indicated   Hepatitis C Screening Once for adults born between 1945 and 1965  More frequently in patients at high risk for Hepatitis C Hep C Antibody: 07/17/2017    Screening Current   Diabetes Screening 1-2 times per year if you're at risk for diabetes or have pre-diabetes Fasting glucose: No results in last 5 years (No results in last 5 years)  A1C: 6 1 % of total Hgb (8/22/2022)  Screening Not Indicated  History Diabetes   Cholesterol Screening Once every 5 years if you don't have a lipid disorder  May order more often based on risk factors  Lipid panel: 08/22/2022    Screening Not Indicated  History Lipid Disorder     Other Preventive Screenings Covered by Medicare:  1  Abdominal Aortic Aneurysm (AAA) Screening: covered once if your at risk  You're considered to be at risk if you have a family history of AAA  2  Lung Cancer Screening: covers low dose CT scan once per year if you meet all of the following conditions: (1) Age 50-69; (2) No signs or symptoms of lung cancer; (3) Current smoker or have quit smoking within the last 15 years; (4) You have a tobacco smoking history of at least 20 pack years (packs per day multiplied by number of years you smoked); (5) You get a written order from a healthcare provider  3  Glaucoma Screening: covered annually if you're considered high risk: (1) You have diabetes OR (2) Family history of glaucoma OR (3)  aged 48 and older OR (3)  American aged 72 and older  3  Osteoporosis Screening: covered every 2 years if you meet one of the following conditions: (1) You're estrogen deficient and at risk for osteoporosis based off medical history and other findings; (2) Have a vertebral abnormality; (3) On glucocorticoid therapy for more than 3 months; (4) Have primary hyperparathyroidism; (5) On osteoporosis medications and need to assess response to drug therapy  · Last bone density test (DXA Scan): 01/18/2021   5  HIV Screening: covered annually if you're between the age of 15-65   Also covered annually if you are younger than 13 and older than 72 with risk factors for HIV infection  For pregnant patients, it is covered up to 3 times per pregnancy  Immunizations:  Immunization Recommendations   Influenza Vaccine Annual influenza vaccination during flu season is recommended for all persons aged >= 6 months who do not have contraindications   Pneumococcal Vaccine   * Pneumococcal conjugate vaccine = PCV13 (Prevnar 13), PCV15 (Vaxneuvance), PCV20 (Prevnar 20)  * Pneumococcal polysaccharide vaccine = PPSV23 (Pneumovax) Adults 25-60 years old: 1-3 doses may be recommended based on certain risk factors  Adults 72 years old: 1-2 doses may be recommended based off what pneumonia vaccine you previously received   Hepatitis B Vaccine 3 dose series if at intermediate or high risk (ex: diabetes, end stage renal disease, liver disease)   Tetanus (Td) Vaccine - COST NOT COVERED BY MEDICARE PART B Following completion of primary series, a booster dose should be given every 10 years to maintain immunity against tetanus  Td may also be given as tetanus wound prophylaxis  Tdap Vaccine - COST NOT COVERED BY MEDICARE PART B Recommended at least once for all adults  For pregnant patients, recommended with each pregnancy  Shingles Vaccine (Shingrix) - COST NOT COVERED BY MEDICARE PART B  2 shot series recommended in those aged 48 and above     Health Maintenance Due:      Topic Date Due    Breast Cancer Screening: Mammogram  10/13/2023    Colorectal Cancer Screening  11/02/2025    Hepatitis C Screening  Completed     Immunizations Due:      Topic Date Due    COVID-19 Vaccine (4 - Booster for Pfizer series) 04/16/2022    Influenza Vaccine (1) 09/01/2022     Advance Directives   What are advance directives? Advance directives are legal documents that state your wishes and plans for medical care  These plans are made ahead of time in case you lose your ability to make decisions for yourself   Advance directives can apply to any medical decision, such as the treatments you want, and if you want to donate organs  What are the types of advance directives? There are many types of advance directives, and each state has rules about how to use them  You may choose a combination of any of the following:  · Living will: This is a written record of the treatment you want  You can also choose which treatments you do not want, which to limit, and which to stop at a certain time  This includes surgery, medicine, IV fluid, and tube feedings  · Durable power of  for healthcare [facility]): This is a written record that states who you want to make healthcare choices for you when you are unable to make them for yourself  This person, called a proxy, is usually a family member or a friend  You may choose more than 1 proxy  · Do not resuscitate (DNR) order:  A DNR order is used in case your heart stops beating or you stop breathing  It is a request not to have certain forms of treatment, such as CPR  A DNR order may be included in other types of advance directives  · Medical directive: This covers the care that you want if you are in a coma, near death, or unable to make decisions for yourself  You can list the treatments you want for each condition  Treatment may include pain medicine, surgery, blood transfusions, dialysis, IV or tube feedings, and a ventilator (breathing machine)  · Values history: This document has questions about your views, beliefs, and how you feel and think about life  This information can help others choose the care that you would choose  Why are advance directives important? An advance directive helps you control your care  Although spoken wishes may be used, it is better to have your wishes written down  Spoken wishes can be misunderstood, or not followed  Treatments may be given even if you do not want them  An advance directive may make it easier for your family to make difficult choices about your care     Urinary Incontinence   Urinary incontinence (UI)  is when you lose control of your bladder  UI develops because your bladder cannot store or empty urine properly  The 3 most common types of UI are stress incontinence, urge incontinence, or both  Medicines:   · May be given to help strengthen your bladder control  Report any side effects of medication to your healthcare provider  Do pelvic muscle exercises often:  Your pelvic muscles help you stop urinating  Squeeze these muscles tight for 5 seconds, then relax for 5 seconds  Gradually work up to squeezing for 10 seconds  Do 3 sets of 15 repetitions a day, or as directed  This will help strengthen your pelvic muscles and improve bladder control  Train your bladder:  Go to the bathroom at set times, such as every 2 hours, even if you do not feel the urge to go  You can also try to hold your urine when you feel the urge to go  For example, hold your urine for 5 minutes when you feel the urge to go  As that becomes easier, hold your urine for 10 minutes  Self-care:   · Keep a UI record  Write down how often you leak urine and how much you leak  Make a note of what you were doing when you leaked urine  · Drink liquids as directed  You may need to limit the amount of liquid you drink to help control your urine leakage  Do not drink any liquid right before you go to bed  Limit or do not have drinks that contain caffeine or alcohol  · Prevent constipation  Eat a variety of high-fiber foods  Good examples are high-fiber cereals, beans, vegetables, and whole-grain breads  Walking is the best way to trigger your intestines to have a bowel movement  · Exercise regularly and maintain a healthy weight  Weight loss and exercise will decrease pressure on your bladder and help you control your leakage  · Use a catheter as directed  to help empty your bladder  A catheter is a tiny, plastic tube that is put into your bladder to drain your urine  · Go to behavior therapy as directed    Behavior therapy may be used to help you learn to control your urge to urinate  Weight Management   Why it is important to manage your weight:  Being overweight increases your risk of health conditions such as heart disease, high blood pressure, type 2 diabetes, and certain types of cancer  It can also increase your risk for osteoarthritis, sleep apnea, and other respiratory problems  Aim for a slow, steady weight loss  Even a small amount of weight loss can lower your risk of health problems  How to lose weight safely:  A safe and healthy way to lose weight is to eat fewer calories and get regular exercise  You can lose up about 1 pound a week by decreasing the number of calories you eat by 500 calories each day  Healthy meal plan for weight management:  A healthy meal plan includes a variety of foods, contains fewer calories, and helps you stay healthy  A healthy meal plan includes the following:  · Eat whole-grain foods more often  A healthy meal plan should contain fiber  Fiber is the part of grains, fruits, and vegetables that is not broken down by your body  Whole-grain foods are healthy and provide extra fiber in your diet  Some examples of whole-grain foods are whole-wheat breads and pastas, oatmeal, brown rice, and bulgur  · Eat a variety of vegetables every day  Include dark, leafy greens such as spinach, kale, risa greens, and mustard greens  Eat yellow and orange vegetables such as carrots, sweet potatoes, and winter squash  · Eat a variety of fruits every day  Choose fresh or canned fruit (canned in its own juice or light syrup) instead of juice  Fruit juice has very little or no fiber  · Eat low-fat dairy foods  Drink fat-free (skim) milk or 1% milk  Eat fat-free yogurt and low-fat cottage cheese  Try low-fat cheeses such as mozzarella and other reduced-fat cheeses  · Choose meat and other protein foods that are low in fat  Choose beans or other legumes such as split peas or lentils   Choose fish, skinless poultry (chicken or turkey), or lean cuts of red meat (beef or pork)  Before you cook meat or poultry, cut off any visible fat  · Use less fat and oil  Try baking foods instead of frying them  Add less fat, such as margarine, sour cream, regular salad dressing and mayonnaise to foods  Eat fewer high-fat foods  Some examples of high-fat foods include french fries, doughnuts, ice cream, and cakes  · Eat fewer sweets  Limit foods and drinks that are high in sugar  This includes candy, cookies, regular soda, and sweetened drinks  Exercise:  Exercise at least 30 minutes per day on most days of the week  Some examples of exercise include walking, biking, dancing, and swimming  You can also fit in more physical activity by taking the stairs instead of the elevator or parking farther away from stores  Ask your healthcare provider about the best exercise plan for you  © Copyright [employer] 2018 Information is for End User's use only and may not be sold, redistributed or otherwise used for commercial purposes   All illustrations and images included in CareNotes® are the copyrighted property of [employer]  or [address] [place] [address]

## 2022-09-13 NOTE — ASSESSMENT & PLAN NOTE
BP elevated today, did not take BP medication yet today  Home 's/80's  Will continue to work on lifestyle modifications, just started walking  Continue same medications for now  Return in 4 months for recheck

## 2022-10-06 DIAGNOSIS — E11.9 TYPE 2 DIABETES MELLITUS WITHOUT COMPLICATION, WITHOUT LONG-TERM CURRENT USE OF INSULIN (HCC): ICD-10-CM

## 2022-10-06 RX ORDER — DULAGLUTIDE 0.75 MG/.5ML
INJECTION, SOLUTION SUBCUTANEOUS
Qty: 2 ML | Refills: 1 | Status: SHIPPED | OUTPATIENT
Start: 2022-10-06

## 2022-10-10 ENCOUNTER — OFFICE VISIT (OUTPATIENT)
Dept: FAMILY MEDICINE CLINIC | Facility: CLINIC | Age: 67
End: 2022-10-10
Payer: COMMERCIAL

## 2022-10-10 VITALS
OXYGEN SATURATION: 97 % | HEIGHT: 61 IN | HEART RATE: 73 BPM | WEIGHT: 182.4 LBS | TEMPERATURE: 97.7 F | BODY MASS INDEX: 34.44 KG/M2 | DIASTOLIC BLOOD PRESSURE: 88 MMHG | SYSTOLIC BLOOD PRESSURE: 140 MMHG | RESPIRATION RATE: 20 BRPM

## 2022-10-10 DIAGNOSIS — L98.9 SKIN LESION: Primary | ICD-10-CM

## 2022-10-10 PROCEDURE — 99213 OFFICE O/P EST LOW 20 MIN: CPT | Performed by: NURSE PRACTITIONER

## 2022-10-10 NOTE — PROGRESS NOTES
FAMILY PRACTICE OFFICE VISIT       NAME: Mitch Yu  AGE: 74 y o  SEX: female       : 1955        MRN: [id number]    Assessment and Plan   1  Skin lesion  -     mupirocin (BACTROBAN) 2 % ointment; Apply topically 3 (three) times a day     Skin lesion on nose, trial mupirocin 2% ointment 2-3 times daily for one week  If skin lesion is not healed over the next one week, she will call me  Chief Complaint     Chief Complaint   Patient presents with   • Skin problem       History of Present Illness     Mitch Yu is a 74year old female presenting today for skin lesion on her nose  2 weeks ago, rubbed her nose and noted it felt like a scab, which she picked off  Never had any skin lesions here before  Thought maybe it was a bug bite  She has been washing her face twice daily with soap, water, wash cloth, scab comes off when washing her face  It then oozes clear fluids  Seems to be getting smaller in size  She has not been picking at it  She has applied Alcohol, Bactine, and Peroxide  Started covering it with a band aid  Yesterday, it was sore with redness around scab  Review of Systems   Review of Systems   Constitutional: Negative  Skin: Positive for wound  I have reviewed the patient's medical history in detail; there are no changes to the history as noted in the electronic medical record  Objective     Vitals:    10/10/22 1459   BP: 140/88   BP Location: Left arm   Patient Position: Sitting   Cuff Size: Large   Pulse: 73   Resp: 20   Temp: 97 7 °F (36 5 °C)   TempSrc: Temporal   SpO2: 97%   Weight: 82 7 kg (182 lb 6 4 oz)   Height: 5' 1" (1 549 m)     Wt Readings from Last 3 Encounters:   10/10/22 82 7 kg (182 lb 6 4 oz)   22 83 kg (183 lb)   22 82 8 kg (182 lb 9 6 oz)     Physical Exam  Vitals and nursing note reviewed  Constitutional:       Appearance: Normal appearance     Skin:     Comments: Nose with 2 mm round open skin lesion, irregular borders  Mild erythema surrounding open area  Oozing small amount of serous fluid  Mild tenderness  Neurological:      Mental Status: She is alert  Psychiatric:         Mood and Affect: Mood normal          Depression Screening and Follow-up Plan: Patient was screened for depression during today's encounter  They screened negative with a PHQ-2 score of 0  ALLERGIES:  Allergies   Allergen Reactions   • Atorvastatin    • Sulfa Antibiotics Myalgia   • Sulfamethopyrazine      Joint stiffness   • Sulfamethoxazole-Trimethoprim Other (See Comments)     Joint pain       Current Medications     Current Outpatient Medications   Medication Sig Dispense Refill   • amLODIPine (NORVASC) 10 mg tablet TAKE 1 TABLET BY MOUTH EVERY DAY WITH DINNER 90 tablet 3   • Blood Glucose Monitoring Suppl (OneTouch Verio Flex System) w/Device KIT USE DAILY AND WHEN HAVING SYMPTOMS AS DIRECTED TO CHECK BLOOD SUGARS 1 kit 0   • Cholecalciferol (VITAMIN D3) 1000 units CAPS Take by mouth     • famotidine (PEPCID) 10 mg tablet Take 10 mg by mouth daily as needed      • Flaxseed Oil OIL by Does not apply route     • fluticasone (FLONASE) 50 mcg/act nasal spray 2 sprays into each nostril daily as needed      • glucose blood (OneTouch Verio) test strip Use daily and when having symptoms as directed to check blood sugars 100 each PRN   • Lactobacillus (PROBIOTIC ACIDOPHILUS) CAPS Take 1 tablet daily as supplement       • Lancet Devices (ONE TOUCH DELICA LANCING DEV) MISC Use daily and when having symptoms as directed to check blood sugars 100 each PRN   • Lancets (OneTouch Delica Plus [id number]) MISC Use daily and when having symptoms as directed to check blood sugars 100 each PRN   • lisinopril (ZESTRIL) 40 mg tablet TAKE 1 TABLET (40 MG TOTAL) BY MOUTH DAILY IN THE EARLY MORNING 90 tablet 3   • loratadine (CLARITIN) 10 mg tablet Take 1 tablet by mouth daily as needed     • metoprolol tartrate (LOPRESSOR) 50 mg tablet Take 1 tablet (50 mg total) by mouth every 12 (twelve) hours 180 tablet 1   • mupirocin (BACTROBAN) 2 % ointment Apply topically 3 (three) times a day 15 g 0   • pravastatin (PRAVACHOL) 20 mg tablet Take 1 tablet (20 mg total) by mouth daily at bedtime 90 tablet 3   • repaglinide (PRANDIN) 0 5 mg tablet TAKE 1 TABLET (0 5 MG TOTAL) BY MOUTH DAILY WITH DINNER 90 tablet 3   • Trulicity 5 53 ID/0 5WW SOPN INJECT 0 5 MILLILITERS UNDER THE SKIN ONE TIME PER WEEK 2 mL 1     No current facility-administered medications for this visit           Health Maintenance     Health Maintenance   Topic Date Due   • PT PLAN OF CARE  10/22/2020   • COVID-19 Vaccine (4 - Booster for Pfizer series) 04/16/2022   • HEMOGLOBIN A1C  02/22/2023   • Diabetic Foot Exam  04/18/2023   • Fall Risk  09/12/2023   • Urinary Incontinence Screening  09/12/2023   • Medicare Annual Wellness Visit (AWV)  09/12/2023   • BMI: Followup Plan  09/12/2023   • Depression Screening  10/10/2023   • BMI: Adult  10/10/2023   • Breast Cancer Screening: Mammogram  10/13/2023   • DM Eye Exam  08/03/2024   • Colorectal Cancer Screening  11/02/2025   • Hepatitis C Screening  Completed   • Osteoporosis Screening  Completed   • Pneumococcal Vaccine: 65+ Years  Completed   • Influenza Vaccine  Completed   • HIB Vaccine  Aged Out   • Hepatitis B Vaccine  Aged Out   • IPV Vaccine  Aged Out   • Hepatitis A Vaccine  Aged Out   • Meningococcal ACWY Vaccine  Aged Out   • HPV Vaccine  Aged Out     Immunization History   Administered Date(s) Administered   • COVID-19 PFIZER VACCINE 0 3 ML IM 04/10/2021, 05/01/2021, 12/16/2021   • INFLUENZA 01/05/2016, 10/18/2018, 09/18/2022   • Influenza Quadrivalent Preservative Free 3 years and older IM 10/04/2017   • Influenza Split High Dose Preservative Free IM 10/11/2019   • Influenza, high dose seasonal 0 7 mL 09/08/2020, 09/10/2021   • Influenza, seasonal, injectable 09/01/2014, 01/05/2016, 10/26/2016   • Pneumococcal Conjugate 13-Valent 12/18/2018   • Pneumococcal Polysaccharide PPV23 07/12/2016, 03/22/2021   • Tdap 07/17/2018   • Zoster 01/01/2014, 08/30/2016       Ariadna Pope

## 2022-11-11 ENCOUNTER — NURSE TRIAGE (OUTPATIENT)
Dept: OTHER | Facility: OTHER | Age: 67
End: 2022-11-11

## 2022-11-11 NOTE — TELEPHONE ENCOUNTER
Reason for Disposition  • SEVERE (e g , excruciating) throat pain    Answer Assessment - Initial Assessment Questions  1  ONSET: "When did the throat start hurting?" (Hours or days ago)       Monday  2  SEVERITY: "How bad is the sore throat?" (Scale 1-10; mild, moderate or severe)    - MILD (1-3):  doesn't interfere with eating or normal activities    - MODERATE (4-7): interferes with eating some solids and normal activities    - SEVERE (8-10):  excruciating pain, interferes with most normal activities    - SEVERE DYSPHAGIA: can't swallow liquids, drooling      severe  3  STREP EXPOSURE: "Has there been any exposure to strep within the past week?" If Yes, ask: "What type of contact occurred?"       Not sure   4  VIRAL SYMPTOMS: "Are there any symptoms of a cold, such as a runny nose, cough, hoarse voice or red eyes?"       Stuffy nose ,cough  5  FEVER: "Do you have a fever?" If Yes, ask: "What is your temperature, how was it measured, and when did it start?"      no  6  PUS ON THE TONSILS: "Is there pus on the tonsils in the back of your throat?"      no  7  OTHER SYMPTOMS: "Do you have any other symptoms?" (e g , difficulty breathing, headache, rash)      Face pain , Head pressure   8   PREGNANCY: "Is there any chance you are pregnant?" "When was your last menstrual period?"     N/A    Protocols used: SORE THROAT-ADULT-

## 2022-11-11 NOTE — TELEPHONE ENCOUNTER
Regarding: sore throat and stuffy head  ----- Message from Juan Herman sent at 11/11/2022  8:17 AM EST -----  " I have not been feeling good for a few days now, today my throat is killing me  I have a stuffy head   I need something to knock it out"

## 2022-11-14 ENCOUNTER — RA CDI HCC (OUTPATIENT)
Dept: OTHER | Facility: HOSPITAL | Age: 67
End: 2022-11-14

## 2022-11-14 NOTE — PROGRESS NOTES
[facility]  coding opportunities       Chart reviewed, no opportunity found:   [address]        Patients Insurance     Medicare Insurance: [employer] Advantage

## 2022-11-15 ENCOUNTER — OFFICE VISIT (OUTPATIENT)
Dept: FAMILY MEDICINE CLINIC | Facility: CLINIC | Age: 67
End: 2022-11-15

## 2022-11-15 VITALS
WEIGHT: 176 LBS | RESPIRATION RATE: 18 BRPM | SYSTOLIC BLOOD PRESSURE: 130 MMHG | DIASTOLIC BLOOD PRESSURE: 80 MMHG | HEIGHT: 61 IN | HEART RATE: 82 BPM | BODY MASS INDEX: 33.23 KG/M2 | OXYGEN SATURATION: 95 % | TEMPERATURE: 97.7 F

## 2022-11-15 DIAGNOSIS — J32.9 SINUSITIS, UNSPECIFIED CHRONICITY, UNSPECIFIED LOCATION: Primary | ICD-10-CM

## 2022-11-15 DIAGNOSIS — E11.9 TYPE 2 DIABETES MELLITUS WITHOUT COMPLICATION, WITHOUT LONG-TERM CURRENT USE OF INSULIN (HCC): ICD-10-CM

## 2022-11-15 LAB — SL AMB POCT HEMOGLOBIN AIC: 6.1 (ref ?–6.5)

## 2022-11-15 RX ORDER — AMOXICILLIN AND CLAVULANATE POTASSIUM 875; 125 MG/1; MG/1
1 TABLET, FILM COATED ORAL EVERY 12 HOURS SCHEDULED
Qty: 14 TABLET | Refills: 0 | Status: SHIPPED | OUTPATIENT
Start: 2022-11-15 | End: 2022-11-22

## 2022-11-15 NOTE — PROGRESS NOTES
FAMILY PRACTICE OFFICE VISIT       NAME: Marli Langston  AGE: 69 y o  SEX: female       : 1955        MRN: [id number]    Assessment and Plan   1  Sinusitis, unspecified chronicity, unspecified location  Comments:  Start Augmentin 1 tablet, with food twice daily  Call for worsening of symptoms, or if symptoms are not improving by the end of the week  Orders:  -     amoxicillin-clavulanate (AUGMENTIN) 875-125 mg per tablet; Take 1 tablet by mouth every 12 (twelve) hours for 7 days    2  Type 2 diabetes mellitus without complication, without long-term current use of insulin [facility])  Assessment & Plan:    Lab Results   Component Value Date    HGBA1C 6 1 11/15/2022     Continue current medications  A1c is at goal    Reviewed, if she is not eating, do not take Prandin  Stay well hydrated  Orders:  -     POCT hemoglobin A1c                 Chief Complaint     Chief Complaint   Patient presents with   • Cold Like Symptoms     Sinus infection, cough 1 + wk       History of Present Illness     Marli Langston is a 69year old female presenting today for URI symptoms  Symptoms started: 8 days ago, got worse on the weekend  fatigue  Eating very little  Cough  Can't lay down, cough is worse  Chills  Sweats  Nasal congestion, post nasal drip  Headaches, hoarse voice  Ears clogged  Little bit of dizziness when getting up  Starting to make little improvements  Did not do any home COVID-19 tests   has a cough, attributed to allergies  Taking Delsym as needed  A1c 6 1%    Sipping fluids  Sugars running 122-170 over last week  Blood pressure 125-148/66-77 over last one week  Sore Throat   This is a new problem  The current episode started in the past 7 days  The problem has been gradually worsening  Neither side of throat is experiencing more pain than the other  The maximum temperature recorded prior to her arrival was 100 - 100 9 F   The fever has been present for 1 to 2 days  The pain is at a severity of 7/10  Associated symptoms include congestion, coughing, headaches, a hoarse voice, a plugged ear sensation, stridor and trouble swallowing  Pertinent negatives include no abdominal pain, diarrhea, drooling, ear discharge, ear pain, neck pain, shortness of breath, swollen glands or vomiting  She has had no exposure to strep or mono  Review of Systems   Review of Systems   HENT: Positive for congestion, hoarse voice, sore throat and trouble swallowing  Negative for drooling, ear discharge and ear pain  Respiratory: Positive for cough and stridor  Negative for shortness of breath  Gastrointestinal: Negative for abdominal pain, diarrhea and vomiting  Musculoskeletal: Negative for neck pain  Neurological: Positive for headaches  I have reviewed the patient's medical history in detail; there are no changes to the history as noted in the electronic medical record  Objective     Vitals:    11/15/22 1034   BP: 130/80   Pulse: 82   Resp: 18   Temp: 97 7 °F (36 5 °C)   TempSrc: Temporal   SpO2: 95%   Weight: 79 8 kg (176 lb)   Height: 5' 1" (1 549 m)     Wt Readings from Last 3 Encounters:   11/15/22 79 8 kg (176 lb)   10/10/22 82 7 kg (182 lb 6 4 oz)   09/12/22 83 kg (183 lb)     Physical Exam  Vitals and nursing note reviewed  Constitutional:       General: She is not in acute distress  Appearance: Normal appearance  She is not ill-appearing  HENT:      Head: Atraumatic  Right Ear: Tympanic membrane normal       Left Ear: Tympanic membrane normal       Nose: Congestion and rhinorrhea present  Mouth/Throat:      Mouth: Mucous membranes are moist       Comments: Clear to white post nasal drip  Cardiovascular:      Rate and Rhythm: Normal rate and regular rhythm  Heart sounds: No murmur heard  Pulmonary:      Effort: Pulmonary effort is normal  No respiratory distress  Breath sounds: Normal breath sounds   No wheezing or kiersten  Comments: Dry cough  Musculoskeletal:      Cervical back: Normal range of motion and neck supple  Lymphadenopathy:      Cervical: No cervical adenopathy  Skin:     General: Skin is warm and dry  Findings: No rash  Neurological:      Mental Status: She is alert  Psychiatric:         Mood and Affect: Mood normal             ALLERGIES:  Allergies   Allergen Reactions   • Atorvastatin    • Sulfa Antibiotics Myalgia   • Sulfamethopyrazine      Joint stiffness   • Sulfamethoxazole-Trimethoprim Other (See Comments)     Joint pain       Current Medications     Current Outpatient Medications   Medication Sig Dispense Refill   • amLODIPine (NORVASC) 10 mg tablet TAKE 1 TABLET BY MOUTH EVERY DAY WITH DINNER 90 tablet 3   • amoxicillin-clavulanate (AUGMENTIN) 875-125 mg per tablet Take 1 tablet by mouth every 12 (twelve) hours for 7 days 14 tablet 0   • Blood Glucose Monitoring Suppl (OneTouch Verio Flex System) w/Device KIT USE DAILY AND WHEN HAVING SYMPTOMS AS DIRECTED TO CHECK BLOOD SUGARS 1 kit 0   • Cholecalciferol (VITAMIN D3) 1000 units CAPS Take by mouth     • famotidine (PEPCID) 10 mg tablet Take 10 mg by mouth daily as needed      • Flaxseed Oil OIL by Does not apply route     • fluticasone (FLONASE) 50 mcg/act nasal spray 2 sprays into each nostril daily as needed      • glucose blood (OneTouch Verio) test strip Use daily and when having symptoms as directed to check blood sugars 100 each PRN   • Lactobacillus (PROBIOTIC ACIDOPHILUS) CAPS Take 1 tablet daily as supplement       • Lancet Devices (ONE TOUCH DELICA LANCING DEV) MISC Use daily and when having symptoms as directed to check blood sugars 100 each PRN   • Lancets (OneTouch Delica Plus [id number]) MISC Use daily and when having symptoms as directed to check blood sugars 100 each PRN   • lisinopril (ZESTRIL) 40 mg tablet TAKE 1 TABLET (40 MG TOTAL) BY MOUTH DAILY IN THE EARLY MORNING 90 tablet 3   • loratadine (CLARITIN) 10 mg tablet Take 1 tablet by mouth daily as needed     • metoprolol tartrate (LOPRESSOR) 50 mg tablet Take 1 tablet (50 mg total) by mouth every 12 (twelve) hours 180 tablet 1   • mupirocin (BACTROBAN) 2 % ointment Apply topically 3 (three) times a day 15 g 0   • pravastatin (PRAVACHOL) 20 mg tablet Take 1 tablet (20 mg total) by mouth daily at bedtime 90 tablet 3   • repaglinide (PRANDIN) 0 5 mg tablet TAKE 1 TABLET (0 5 MG TOTAL) BY MOUTH DAILY WITH DINNER 90 tablet 3   • Trulicity 0 63 MF/6 9NB SOPN INJECT 0 5 MILLILITERS UNDER THE SKIN ONE TIME PER WEEK 2 mL 1     No current facility-administered medications for this visit           Health Maintenance     Health Maintenance   Topic Date Due   • PT PLAN OF CARE  10/22/2020   • COVID-19 Vaccine (4 - Booster for Pfizer series) 04/16/2022   • Diabetic Foot Exam  04/18/2023   • HEMOGLOBIN A1C  05/15/2023   • Fall Risk  09/12/2023   • Urinary Incontinence Screening  09/12/2023   • Medicare Annual Wellness Visit (AWV)  09/12/2023   • BMI: Followup Plan  09/12/2023   • Depression Screening  10/10/2023   • Breast Cancer Screening: Mammogram  10/13/2023   • BMI: Adult  11/15/2023   • DM Eye Exam  08/03/2024   • Colorectal Cancer Screening  11/02/2025   • Hepatitis C Screening  Completed   • Osteoporosis Screening  Completed   • Pneumococcal Vaccine: 65+ Years  Completed   • Influenza Vaccine  Completed   • HIB Vaccine  Aged Out   • Hepatitis B Vaccine  Aged Out   • IPV Vaccine  Aged Out   • Hepatitis A Vaccine  Aged Out   • Meningococcal ACWY Vaccine  Aged Out   • HPV Vaccine  Aged Out     Immunization History   Administered Date(s) Administered   • COVID-19 PFIZER VACCINE 0 3 ML IM 04/10/2021, 05/01/2021, 12/16/2021   • INFLUENZA 01/05/2016, 10/18/2018, 09/18/2022   • Influenza Quadrivalent Preservative Free 3 years and older IM 10/04/2017   • Influenza Split High Dose Preservative Free IM 10/11/2019   • Influenza, high dose seasonal 0 7 mL 09/08/2020, 09/10/2021   • Influenza, seasonal, injectable 09/01/2014, 01/05/2016, 10/26/2016   • Pneumococcal Conjugate 13-Valent 12/18/2018   • Pneumococcal Polysaccharide PPV23 07/12/2016, 03/22/2021   • Tdap 07/17/2018   • Zoster 01/01/2014, 08/30/2016       JOSHUA Montalvo

## 2022-11-15 NOTE — ASSESSMENT & PLAN NOTE
Lab Results   Component Value Date    HGBA1C 6 1 11/15/2022     Continue current medications  A1c is at goal    Reviewed, if she is not eating, do not take Prandin  Stay well hydrated

## 2022-11-21 DIAGNOSIS — I10 BENIGN ESSENTIAL HYPERTENSION: ICD-10-CM

## 2022-11-21 RX ORDER — METOPROLOL TARTRATE 50 MG/1
50 TABLET, FILM COATED ORAL EVERY 12 HOURS SCHEDULED
Qty: 180 TABLET | Refills: 3 | Status: SHIPPED | OUTPATIENT
Start: 2022-11-21

## 2022-11-28 ENCOUNTER — TELEPHONE (OUTPATIENT)
Dept: FAMILY MEDICINE CLINIC | Facility: CLINIC | Age: 67
End: 2022-11-28

## 2022-11-28 DIAGNOSIS — E11.9 TYPE 2 DIABETES MELLITUS WITHOUT COMPLICATION, WITHOUT LONG-TERM CURRENT USE OF INSULIN (HCC): ICD-10-CM

## 2022-11-28 RX ORDER — DULAGLUTIDE 0.75 MG/.5ML
INJECTION, SOLUTION SUBCUTANEOUS
Qty: 2 ML | Refills: 1 | Status: SHIPPED | OUTPATIENT
Start: 2022-11-28

## 2022-11-28 NOTE — TELEPHONE ENCOUNTER
Patient called and asked if you could order her a lymes test   She doesn't remember being bit by a tick but is just thinking with how sick she is/was she was just wondering if this could be lymes    Please call to advise    CASPER:  Either way she would like labs mailed to her home so [facility] can [facility] at [facility]

## 2022-11-29 DIAGNOSIS — R53.83 FATIGUE, UNSPECIFIED TYPE: ICD-10-CM

## 2022-11-29 DIAGNOSIS — M79.10 MYALGIA: Primary | ICD-10-CM

## 2022-11-29 NOTE — TELEPHONE ENCOUNTER
Lyme disease does not have accompanying upper respiratory symptoms  You likely started with Flu or COVID-19, that turned into a sinus infection  Flu and COVID-19 are increasing in prevalence  If you are not feeling better--still having body aches, joint aches, headaches, fatigue, neck pain, night sweats, or fevers, we could proceed with a lyme test  If all symptoms have resolved, this would not be indicated

## 2022-11-29 NOTE — TELEPHONE ENCOUNTER
Spoke with pt, she would like to have Lyme's Disease testing  Please place orders for blood work  Pt will p/u scripts at office  Place up front

## 2022-12-02 LAB
ALBUMIN SERPL-MCNC: 3.8 G/DL (ref 3.6–5.1)
ALBUMIN/GLOB SERPL: 1.3 (CALC) (ref 1–2.5)
ALP SERPL-CCNC: 82 U/L (ref 37–153)
ALT SERPL-CCNC: 17 U/L (ref 6–29)
AST SERPL-CCNC: 14 U/L (ref 10–35)
B BURGDOR AB SER IA-ACNC: <0.9 INDEX
BASOPHILS # BLD AUTO: 70 CELLS/UL (ref 0–200)
BASOPHILS NFR BLD AUTO: 1.3 %
BILIRUB SERPL-MCNC: 0.7 MG/DL (ref 0.2–1.2)
BUN SERPL-MCNC: 14 MG/DL (ref 7–25)
BUN/CREAT SERPL: ABNORMAL (CALC) (ref 6–22)
CALCIUM SERPL-MCNC: 9.5 MG/DL (ref 8.6–10.4)
CHLORIDE SERPL-SCNC: 104 MMOL/L (ref 98–110)
CHOLEST SERPL-MCNC: 163 MG/DL
CHOLEST/HDLC SERPL: 3 (CALC)
CO2 SERPL-SCNC: 31 MMOL/L (ref 20–32)
CREAT SERPL-MCNC: 0.92 MG/DL (ref 0.5–1.05)
EOSINOPHIL # BLD AUTO: 140 CELLS/UL (ref 15–500)
EOSINOPHIL NFR BLD AUTO: 2.6 %
ERYTHROCYTE [DISTWIDTH] IN BLOOD BY AUTOMATED COUNT: 13.2 % (ref 11–15)
GFR/BSA.PRED SERPLBLD CYS-BASED-ARV: 67 ML/MIN/1.73M2
GLOBULIN SER CALC-MCNC: 2.9 G/DL (CALC) (ref 1.9–3.7)
GLUCOSE SERPL-MCNC: 131 MG/DL (ref 65–99)
HBA1C MFR BLD: 6 % OF TOTAL HGB
HCT VFR BLD AUTO: 45.3 % (ref 35–45)
HDLC SERPL-MCNC: 55 MG/DL
HGB BLD-MCNC: 14.8 G/DL (ref 11.7–15.5)
LDLC SERPL CALC-MCNC: 87 MG/DL (CALC)
LYMPHOCYTES # BLD AUTO: 1247 CELLS/UL (ref 850–3900)
LYMPHOCYTES NFR BLD AUTO: 23.1 %
MCH RBC QN AUTO: 28.8 PG (ref 27–33)
MCHC RBC AUTO-ENTMCNC: 32.7 G/DL (ref 32–36)
MCV RBC AUTO: 88.1 FL (ref 80–100)
MONOCYTES # BLD AUTO: 562 CELLS/UL (ref 200–950)
MONOCYTES NFR BLD AUTO: 10.4 %
NEUTROPHILS # BLD AUTO: 3380 CELLS/UL (ref 1500–7800)
NEUTROPHILS NFR BLD AUTO: 62.6 %
NONHDLC SERPL-MCNC: 108 MG/DL (CALC)
PLATELET # BLD AUTO: 388 THOUSAND/UL (ref 140–400)
PMV BLD REES-ECKER: 11.8 FL (ref 7.5–12.5)
POTASSIUM SERPL-SCNC: 4.2 MMOL/L (ref 3.5–5.3)
PROT SERPL-MCNC: 6.7 G/DL (ref 6.1–8.1)
RBC # BLD AUTO: 5.14 MILLION/UL (ref 3.8–5.1)
SODIUM SERPL-SCNC: 142 MMOL/L (ref 135–146)
TRIGL SERPL-MCNC: 116 MG/DL
WBC # BLD AUTO: 5.4 THOUSAND/UL (ref 3.8–10.8)

## 2022-12-05 ENCOUNTER — TELEPHONE (OUTPATIENT)
Dept: FAMILY MEDICINE CLINIC | Facility: CLINIC | Age: 67
End: 2022-12-05

## 2022-12-05 NOTE — TELEPHONE ENCOUNTER
----- Message from [id number] sent at 12/4/2022  6:32 PM EST -----  Stable blood work  Negative lyme

## 2023-01-01 DIAGNOSIS — I10 BENIGN ESSENTIAL HYPERTENSION: ICD-10-CM

## 2023-01-01 RX ORDER — AMLODIPINE BESYLATE 10 MG/1
TABLET ORAL
Qty: 90 TABLET | Refills: 3 | Status: SHIPPED | OUTPATIENT
Start: 2023-01-01

## 2023-01-06 ENCOUNTER — RA CDI HCC (OUTPATIENT)
Dept: OTHER | Facility: HOSPITAL | Age: 68
End: 2023-01-06

## 2023-01-06 NOTE — PROGRESS NOTES
[facility]  coding opportunities       Chart reviewed, no opportunity found:   [address]        Patients Insurance     Medicare Insurance: [employer]

## 2023-01-10 LAB
ALBUMIN SERPL-MCNC: 4.4 G/DL (ref 3.6–5.1)
ALBUMIN/GLOB SERPL: 1.6 (CALC) (ref 1–2.5)
ALP SERPL-CCNC: 85 U/L (ref 37–153)
ALT SERPL-CCNC: 18 U/L (ref 6–29)
AST SERPL-CCNC: 14 U/L (ref 10–35)
BASOPHILS # BLD AUTO: 50 CELLS/UL (ref 0–200)
BASOPHILS NFR BLD AUTO: 0.8 %
BILIRUB SERPL-MCNC: 0.5 MG/DL (ref 0.2–1.2)
BUN SERPL-MCNC: 18 MG/DL (ref 7–25)
BUN/CREAT SERPL: ABNORMAL (CALC) (ref 6–22)
CALCIUM SERPL-MCNC: 9.8 MG/DL (ref 8.6–10.4)
CHLORIDE SERPL-SCNC: 105 MMOL/L (ref 98–110)
CHOLEST SERPL-MCNC: 176 MG/DL
CHOLEST/HDLC SERPL: 2.8 (CALC)
CO2 SERPL-SCNC: 29 MMOL/L (ref 20–32)
CREAT SERPL-MCNC: 0.88 MG/DL (ref 0.5–1.05)
EOSINOPHIL # BLD AUTO: 189 CELLS/UL (ref 15–500)
EOSINOPHIL NFR BLD AUTO: 3 %
ERYTHROCYTE [DISTWIDTH] IN BLOOD BY AUTOMATED COUNT: 14 % (ref 11–15)
GFR/BSA.PRED SERPLBLD CYS-BASED-ARV: 71 ML/MIN/1.73M2
GLOBULIN SER CALC-MCNC: 2.8 G/DL (CALC) (ref 1.9–3.7)
GLUCOSE SERPL-MCNC: 111 MG/DL (ref 65–99)
HBA1C MFR BLD: 5.7 % OF TOTAL HGB
HCT VFR BLD AUTO: 48.7 % (ref 35–45)
HDLC SERPL-MCNC: 63 MG/DL
HGB BLD-MCNC: 16.3 G/DL (ref 11.7–15.5)
LDLC SERPL CALC-MCNC: 91 MG/DL (CALC)
LYMPHOCYTES # BLD AUTO: 1915 CELLS/UL (ref 850–3900)
LYMPHOCYTES NFR BLD AUTO: 30.4 %
MCH RBC QN AUTO: 29.8 PG (ref 27–33)
MCHC RBC AUTO-ENTMCNC: 33.5 G/DL (ref 32–36)
MCV RBC AUTO: 89 FL (ref 80–100)
MONOCYTES # BLD AUTO: 611 CELLS/UL (ref 200–950)
MONOCYTES NFR BLD AUTO: 9.7 %
NEUTROPHILS # BLD AUTO: 3534 CELLS/UL (ref 1500–7800)
NEUTROPHILS NFR BLD AUTO: 56.1 %
NONHDLC SERPL-MCNC: 113 MG/DL (CALC)
PLATELET # BLD AUTO: 365 THOUSAND/UL (ref 140–400)
PMV BLD REES-ECKER: 11.7 FL (ref 7.5–12.5)
POTASSIUM SERPL-SCNC: 4.3 MMOL/L (ref 3.5–5.3)
PROT SERPL-MCNC: 7.2 G/DL (ref 6.1–8.1)
RBC # BLD AUTO: 5.47 MILLION/UL (ref 3.8–5.1)
SODIUM SERPL-SCNC: 142 MMOL/L (ref 135–146)
TRIGL SERPL-MCNC: 119 MG/DL
WBC # BLD AUTO: 6.3 THOUSAND/UL (ref 3.8–10.8)

## 2023-01-12 ENCOUNTER — OFFICE VISIT (OUTPATIENT)
Dept: FAMILY MEDICINE CLINIC | Facility: CLINIC | Age: 68
End: 2023-01-12

## 2023-01-12 VITALS
BODY MASS INDEX: 33.23 KG/M2 | HEART RATE: 92 BPM | SYSTOLIC BLOOD PRESSURE: 150 MMHG | RESPIRATION RATE: 16 BRPM | HEIGHT: 61 IN | DIASTOLIC BLOOD PRESSURE: 90 MMHG | OXYGEN SATURATION: 96 % | WEIGHT: 176 LBS | TEMPERATURE: 97.6 F

## 2023-01-12 DIAGNOSIS — E11.9 TYPE 2 DIABETES MELLITUS WITHOUT COMPLICATION, WITHOUT LONG-TERM CURRENT USE OF INSULIN (HCC): ICD-10-CM

## 2023-01-12 DIAGNOSIS — I10 BENIGN ESSENTIAL HYPERTENSION: Primary | ICD-10-CM

## 2023-01-12 DIAGNOSIS — E66.09 CLASS 1 OBESITY DUE TO EXCESS CALORIES WITH SERIOUS COMORBIDITY AND BODY MASS INDEX (BMI) OF 33.0 TO 33.9 IN ADULT: ICD-10-CM

## 2023-01-12 DIAGNOSIS — E78.5 HYPERLIPIDEMIA, UNSPECIFIED HYPERLIPIDEMIA TYPE: ICD-10-CM

## 2023-01-12 DIAGNOSIS — D58.2 ELEVATED HEMOGLOBIN (HCC): ICD-10-CM

## 2023-01-12 PROBLEM — E66.811 CLASS 1 OBESITY DUE TO EXCESS CALORIES WITH BODY MASS INDEX (BMI) OF 33.0 TO 33.9 IN ADULT: Status: ACTIVE | Noted: 2018-01-12

## 2023-01-12 NOTE — PROGRESS NOTES
FAMILY PRACTICE OFFICE VISIT       NAME: Barak Diaz  AGE: 69 y o  SEX: female       : 1955        MRN: [id number]    Assessment and Plan   1  Benign essential hypertension  Assessment & Plan:  BP is elevated today  Maria A Mahoney notes she has been upset last few days  Home BP running 140's/70-80's  Will continue same medications  Continue home BP checks  Call if consistently >140/90  Orders:  -     CBC; Future  -     Comprehensive metabolic panel; Future  -     TSH, 3rd generation; Future    2  Class 1 obesity due to excess calories with serious comorbidity and body mass index (BMI) of 33 0 to 33 9 in adult  Assessment & Plan:  Has been successful in losing some weight  Keep up the good work  3  Elevated hemoglobin (HCC)  Assessment & Plan:  Current H&H 16 3/48 7, prior on 22--14   3  Untreated ERNESTINE  Prior hematology evaluation  Continue to monitor  4  Type 2 diabetes mellitus without complication, without long-term current use of insulin (HCC)  Assessment & Plan:    Lab Results   Component Value Date    HGBA1C 5 7 (H) 2023     A1c excellent at 5 7%  Continue current medications  Orders:  -     Hemoglobin A1C; Future  -     Microalbumin / creatinine urine ratio; Future    5  Hyperlipidemia, unspecified hyperlipidemia type  Assessment & Plan:  Lipid panel total cholesterol 176, triglycerides 119, HDL 63, LDL 91    Stable on pravastatin 20 mg daily  Orders:  -     Lipid panel; Future       Repeat blood work and follow up in 6 months  Chief Complaint     Chief Complaint   Patient presents with   • Follow-up     Pt is here for 4 mos f/u       History of Present Illness     Barak Diaz is a 69year old female presenting today for check up on chronic conditions  Feeling well, has no complaints today  Review of Systems   Review of Systems   Constitutional: Negative  HENT: Negative  Respiratory: Negative      Cardiovascular: Negative  Gastrointestinal: Negative  Genitourinary: Negative  Musculoskeletal: Negative  Skin: Negative  Neurological: Negative  Psychiatric/Behavioral: Negative  I have reviewed the patient's medical history in detail; there are no changes to the history as noted in the electronic medical record  Objective     Vitals:    01/12/23 0721 01/12/23 0800   BP: 160/90 150/90   Pulse: 92    Resp: 16    Temp: 97 6 °F (36 4 °C)    TempSrc: Temporal    SpO2: 96%    Weight: 79 8 kg (176 lb)    Height: 5' 1" (1 549 m)      Wt Readings from Last 3 Encounters:   01/12/23 79 8 kg (176 lb)   11/15/22 79 8 kg (176 lb)   10/10/22 82 7 kg (182 lb 6 4 oz)     Physical Exam  Vitals and nursing note reviewed  Constitutional:       General: She is not in acute distress  Appearance: Normal appearance  HENT:      Head: Atraumatic  Right Ear: Tympanic membrane normal       Left Ear: Tympanic membrane normal       Mouth/Throat:      Mouth: Mucous membranes are moist       Pharynx: Oropharynx is clear  Eyes:      Conjunctiva/sclera: Conjunctivae normal       Pupils: Pupils are equal, round, and reactive to light  Neck:      Thyroid: No thyromegaly  Vascular: No carotid bruit  Cardiovascular:      Rate and Rhythm: Normal rate and regular rhythm  Heart sounds: No murmur heard  Pulmonary:      Effort: Pulmonary effort is normal  No respiratory distress  Breath sounds: Normal breath sounds  Musculoskeletal:      Cervical back: Normal range of motion and neck supple  Right lower leg: No edema  Left lower leg: No edema  Lymphadenopathy:      Cervical: No cervical adenopathy  Neurological:      Mental Status: She is alert        Gait: Gait normal    Psychiatric:         Mood and Affect: Mood normal             ALLERGIES:  Allergies   Allergen Reactions   • Atorvastatin    • Sulfa Antibiotics Myalgia   • Sulfamethopyrazine      Joint stiffness   • Sulfamethoxazole-Trimethoprim Other (See Comments)     Joint pain       Current Medications     Current Outpatient Medications   Medication Sig Dispense Refill   • amLODIPine (NORVASC) 10 mg tablet TAKE 1 TABLET BY MOUTH EVERY DAY WITH DINNER 90 tablet 3   • Blood Glucose Monitoring Suppl (OneTouch Verio Flex System) w/Device KIT USE DAILY AND WHEN HAVING SYMPTOMS AS DIRECTED TO CHECK BLOOD SUGARS 1 kit 0   • Cholecalciferol (VITAMIN D3) 1000 units CAPS Take by mouth     • famotidine (PEPCID) 10 mg tablet Take 10 mg by mouth daily as needed      • Flaxseed Oil OIL by Does not apply route     • fluticasone (FLONASE) 50 mcg/act nasal spray 2 sprays into each nostril daily as needed      • glucose blood (OneTouch Verio) test strip Use daily and when having symptoms as directed to check blood sugars 100 each PRN   • Lactobacillus (PROBIOTIC ACIDOPHILUS) CAPS Take 1 tablet daily as supplement  • Lancet Devices (ONE TOUCH DELICA LANCING DEV) MISC Use daily and when having symptoms as directed to check blood sugars 100 each PRN   • Lancets (OneTouch Delica Plus [id number]) MISC Use daily and when having symptoms as directed to check blood sugars 100 each PRN   • lisinopril (ZESTRIL) 40 mg tablet TAKE 1 TABLET (40 MG TOTAL) BY MOUTH DAILY IN THE EARLY MORNING 90 tablet 3   • loratadine (CLARITIN) 10 mg tablet Take 1 tablet by mouth daily as needed     • metoprolol tartrate (LOPRESSOR) 50 mg tablet Take 1 tablet (50 mg total) by mouth every 12 (twelve) hours 180 tablet 3   • mupirocin (BACTROBAN) 2 % ointment Apply topically 3 (three) times a day 15 g 0   • pravastatin (PRAVACHOL) 20 mg tablet Take 1 tablet (20 mg total) by mouth daily at bedtime 90 tablet 3   • repaglinide (PRANDIN) 0 5 mg tablet TAKE 1 TABLET (0 5 MG TOTAL) BY MOUTH DAILY WITH DINNER 90 tablet 3   • Trulicity 4 63 YK/1 9KH SOPN INJECT 0 5 MILLILITERS UNDER THE SKIN ONE TIME PER WEEK 2 mL 1     No current facility-administered medications for this visit  Health Maintenance     Health Maintenance   Topic Date Due   • Kidney Health Evaluation: Microalbumin/Creatinine Ratio  08/23/2019   • PT PLAN OF CARE  10/22/2020   • COVID-19 Vaccine (4 - Booster for Pfizer series) 02/10/2022   • Diabetic Foot Exam  04/18/2023   • HEMOGLOBIN A1C  07/09/2023   • Fall Risk  09/12/2023   • Urinary Incontinence Screening  09/12/2023   • Medicare Annual Wellness Visit (AWV)  09/12/2023   • BMI: Followup Plan  09/12/2023   • Depression Screening  10/10/2023   • Breast Cancer Screening: Mammogram  10/13/2023   • Kidney Health Evaluation: GFR  01/09/2024   • BMI: Adult  01/12/2024   • DM Eye Exam  08/03/2024   • Colorectal Cancer Screening  11/02/2025   • Hepatitis C Screening  Completed   • Osteoporosis Screening  Completed   • Pneumococcal Vaccine: 65+ Years  Completed   • Influenza Vaccine  Completed   • HIB Vaccine  Aged Out   • IPV Vaccine  Aged Out   • Hepatitis A Vaccine  Aged Out   • Meningococcal ACWY Vaccine  Aged Out   • HPV Vaccine  Aged Out     Immunization History   Administered Date(s) Administered   • COVID-19 PFIZER VACCINE 0 3 ML IM 04/10/2021, 05/01/2021, 12/16/2021   • INFLUENZA 01/05/2016, 10/18/2018, 09/18/2022   • Influenza Quadrivalent Preservative Free 3 years and older IM 10/04/2017   • Influenza Split High Dose Preservative Free IM 10/11/2019   • Influenza, high dose seasonal 0 7 mL 09/08/2020, 09/10/2021   • Influenza, seasonal, injectable 09/01/2014, 01/05/2016, 10/26/2016   • Pneumococcal Conjugate 13-Valent 12/18/2018   • Pneumococcal Polysaccharide PPV23 07/12/2016, 03/22/2021   • Tdap 07/17/2018   • Zoster 01/01/2014, 08/30/2016       JOSHUA Aquino

## 2023-01-12 NOTE — ASSESSMENT & PLAN NOTE
Current H&H 16 3/48 7, prior on 12/2/22--14 8/45  3  Untreated ERNESTINE  Prior hematology evaluation  Continue to monitor

## 2023-01-12 NOTE — ASSESSMENT & PLAN NOTE
Lab Results   Component Value Date    HGBA1C 5 7 (H) 01/09/2023     A1c excellent at 5 7%  Continue current medications

## 2023-01-12 NOTE — ASSESSMENT & PLAN NOTE
BP is elevated today  Flavio Kitchen notes she has been upset last few days  Home BP running 140's/70-80's  Will continue same medications  Continue home BP checks  Call if consistently >140/90

## 2023-01-12 NOTE — ASSESSMENT & PLAN NOTE
Lipid panel total cholesterol 176, triglycerides 119, HDL 63, LDL 91    Stable on pravastatin 20 mg daily

## 2023-01-14 DIAGNOSIS — E11.9 TYPE 2 DIABETES MELLITUS WITHOUT COMPLICATION, WITHOUT LONG-TERM CURRENT USE OF INSULIN (HCC): ICD-10-CM

## 2023-01-14 RX ORDER — DULAGLUTIDE 0.75 MG/.5ML
INJECTION, SOLUTION SUBCUTANEOUS
Qty: 0.5 ML | Refills: 1 | Status: SHIPPED | OUTPATIENT
Start: 2023-01-14 | End: 2023-01-15 | Stop reason: SDUPTHER

## 2023-01-15 DIAGNOSIS — E11.9 TYPE 2 DIABETES MELLITUS WITHOUT COMPLICATION, WITHOUT LONG-TERM CURRENT USE OF INSULIN (HCC): ICD-10-CM

## 2023-01-15 RX ORDER — DULAGLUTIDE 0.75 MG/.5ML
0.75 INJECTION, SOLUTION SUBCUTANEOUS
Qty: 6 ML | Refills: 3 | Status: SHIPPED | OUTPATIENT
Start: 2023-01-15

## 2023-01-25 ENCOUNTER — HOSPITAL ENCOUNTER (OUTPATIENT)
Dept: MAMMOGRAPHY | Facility: IMAGING CENTER | Age: 68
Discharge: HOME | End: 2023-01-25

## 2023-01-25 VITALS — BODY MASS INDEX: 33.22 KG/M2 | HEIGHT: 61 IN | WEIGHT: 175.93 LBS

## 2023-01-25 DIAGNOSIS — Z12.31 BREAST CANCER SCREENING BY MAMMOGRAM: ICD-10-CM

## 2023-01-28 DIAGNOSIS — E11.9 TYPE 2 DIABETES MELLITUS WITHOUT COMPLICATION, WITHOUT LONG-TERM CURRENT USE OF INSULIN (HCC): ICD-10-CM

## 2023-01-28 DIAGNOSIS — E78.5 DYSLIPIDEMIA: ICD-10-CM

## 2023-01-28 RX ORDER — REPAGLINIDE 0.5 MG/1
0.5 TABLET ORAL
Qty: 90 TABLET | Refills: 3 | Status: SHIPPED | OUTPATIENT
Start: 2023-01-28

## 2023-01-30 RX ORDER — PRAVASTATIN SODIUM 20 MG
TABLET ORAL
Qty: 90 TABLET | Refills: 3 | Status: SHIPPED | OUTPATIENT
Start: 2023-01-30

## 2023-04-08 PROBLEM — E04.1 THYROID NODULE INCIDENTALLY NOTED ON IMAGING STUDY: Status: ACTIVE | Noted: 2023-04-08

## 2023-04-08 PROBLEM — K21.9 GERD (GASTROESOPHAGEAL REFLUX DISEASE): Status: ACTIVE | Noted: 2023-04-08

## 2023-04-08 PROBLEM — R78.81 GRAM-NEGATIVE BACTEREMIA: Status: ACTIVE | Noted: 2023-04-08

## 2023-04-08 PROBLEM — R09.02 HYPOXIA: Status: ACTIVE | Noted: 2023-04-08

## 2023-04-08 PROBLEM — A41.9 SEPSIS WITHOUT ACUTE ORGAN DYSFUNCTION (HCC): Status: ACTIVE | Noted: 2023-04-08

## 2023-04-08 PROBLEM — Q62.11 HYDRONEPHROSIS WITH URETEROPELVIC JUNCTION (UPJ) OBSTRUCTION: Status: ACTIVE | Noted: 2023-04-08

## 2023-04-10 PROBLEM — B96.20 BACTEREMIA, ESCHERICHIA COLI: Status: ACTIVE | Noted: 2023-04-08

## 2023-04-23 DIAGNOSIS — E04.2 MULTIPLE THYROID NODULES: Primary | ICD-10-CM

## 2023-04-24 ENCOUNTER — OFFICE VISIT (OUTPATIENT)
Dept: UROLOGY | Facility: AMBULATORY SURGERY CENTER | Age: 68
End: 2023-04-24

## 2023-04-24 ENCOUNTER — TELEPHONE (OUTPATIENT)
Dept: FAMILY MEDICINE CLINIC | Facility: CLINIC | Age: 68
End: 2023-04-24

## 2023-04-24 VITALS
OXYGEN SATURATION: 97 % | SYSTOLIC BLOOD PRESSURE: 130 MMHG | HEART RATE: 81 BPM | DIASTOLIC BLOOD PRESSURE: 72 MMHG | WEIGHT: 175 LBS | BODY MASS INDEX: 33.07 KG/M2

## 2023-04-24 DIAGNOSIS — N20.0 KIDNEY STONES: Primary | ICD-10-CM

## 2023-04-24 NOTE — TELEPHONE ENCOUNTER
----- Message from [id number] sent at 4/23/2023  8:15 PM EDT -----  3 thyroid nodules stable  Repeat thyroid US in 1 year
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
## 2023-04-26 ENCOUNTER — TELEPHONE (OUTPATIENT)
Dept: UROLOGY | Facility: HOSPITAL | Age: 68
End: 2023-04-26

## 2023-04-26 ENCOUNTER — TELEPHONE (OUTPATIENT)
Dept: UROLOGY | Facility: CLINIC | Age: 68
End: 2023-04-26

## 2023-04-26 DIAGNOSIS — N39.0 URINARY TRACT INFECTION WITHOUT HEMATURIA, SITE UNSPECIFIED: Primary | ICD-10-CM

## 2023-04-26 LAB — BACTERIA UR CULT: ABNORMAL

## 2023-04-26 RX ORDER — CEPHALEXIN 500 MG/1
500 CAPSULE ORAL EVERY 12 HOURS SCHEDULED
Qty: 10 CAPSULE | Refills: 0 | Status: SHIPPED | OUTPATIENT
Start: 2023-04-26 | End: 2023-05-01

## 2023-04-26 NOTE — TELEPHONE ENCOUNTER
Spoke to patient and gave abnormal UC results per CRNP-AS  Patient will start antibiotic today per instructions

## 2023-04-26 NOTE — TELEPHONE ENCOUNTER
----- Message from [id number] Elena Esqueda sent at 4/26/2023  2:06 PM EDT -----  Preop urine culture positive for infection  Prescription for Keflex sent to her pharmacy  She should begin antibiotic today

## 2023-04-27 RX ORDER — IBUPROFEN 200 MG
TABLET ORAL EVERY 6 HOURS PRN
COMMUNITY

## 2023-04-27 RX ORDER — ACETAMINOPHEN 500 MG
500 TABLET ORAL EVERY 6 HOURS PRN
COMMUNITY

## 2023-04-27 NOTE — PRE-PROCEDURE INSTRUCTIONS
Pre-Surgery Instructions:   Medication Instructions    acetaminophen (TYLENOL) 500 mg tablet Uses PRN- OK to take day of surgery    amLODIPine (NORVASC) 10 mg tablet Take night before surgery    cephalexin (KEFLEX) 500 mg capsule will be finished    Cholecalciferol (VITAMIN D3) 1000 units CAPS Hold day of surgery   dulaglutide (Trulicity) 1 62 PM/0 1LK injection n/a    famotidine (PEPCID) 10 mg tablet Uses PRN- OK to take day of surgery    Flaxseed Oil OIL instructed to hold    fluticasone (FLONASE) 50 mcg/act nasal spray Uses PRN- OK to take day of surgery    ibuprofen (MOTRIN) 200 mg tablet instructed to hold    Lactobacillus (PROBIOTIC ACIDOPHILUS) CAPS Hold day of surgery   lisinopril (ZESTRIL) 40 mg tablet Hold day of surgery   loratadine (CLARITIN) 10 mg tablet Uses PRN- OK to take day of surgery    metoprolol tartrate (LOPRESSOR) 50 mg tablet Take day of surgery   pravastatin (PRAVACHOL) 20 mg tablet Take night before surgery    repaglinide (PRANDIN) 0 5 mg tablet Hold day of surgery  [facility] preop instructions reviewed with pt  Pt has antibacterial soap

## 2023-04-28 ENCOUNTER — ANESTHESIA EVENT (OUTPATIENT)
Dept: PERIOP | Facility: HOSPITAL | Age: 68
End: 2023-04-28

## 2023-05-01 ENCOUNTER — HOSPITAL ENCOUNTER (OUTPATIENT)
Facility: HOSPITAL | Age: 68
Setting detail: OUTPATIENT SURGERY
Discharge: HOME | End: 2023-05-01
Attending: UROLOGY | Admitting: UROLOGY

## 2023-05-01 ENCOUNTER — ANESTHESIA (OUTPATIENT)
Dept: PERIOP | Facility: HOSPITAL | Age: 68
End: 2023-05-01

## 2023-05-01 ENCOUNTER — APPOINTMENT (OUTPATIENT)
Dept: RADIOLOGY | Facility: HOSPITAL | Age: 68
End: 2023-05-01

## 2023-05-01 VITALS
SYSTOLIC BLOOD PRESSURE: 130 MMHG | TEMPERATURE: 97.4 F | BODY MASS INDEX: 33.22 KG/M2 | WEIGHT: 175.93 LBS | OXYGEN SATURATION: 98 % | RESPIRATION RATE: 18 BRPM | DIASTOLIC BLOOD PRESSURE: 70 MMHG | HEIGHT: 61 IN | HEART RATE: 73 BPM

## 2023-05-01 DIAGNOSIS — N20.1 CALCULUS OF URETER: Primary | ICD-10-CM

## 2023-05-01 LAB
GLUCOSE SERPL-MCNC: 102 MG/DL (ref 65–140)
GLUCOSE SERPL-MCNC: 127 MG/DL (ref 65–140)

## 2023-05-01 DEVICE — INLAY OPTIMA URETERAL STENT W/O GUIDEWIRE
Type: IMPLANTABLE DEVICE | Site: URETER | Status: FUNCTIONAL
Brand: BARD® INLAY OPTIMA® URETERAL STENT

## 2023-05-01 RX ORDER — MIDAZOLAM HYDROCHLORIDE 2 MG/2ML
INJECTION, SOLUTION INTRAMUSCULAR; INTRAVENOUS AS NEEDED
Status: DISCONTINUED | OUTPATIENT
Start: 2023-05-01 | End: 2023-05-01

## 2023-05-01 RX ORDER — PROPOFOL 10 MG/ML
INJECTION, EMULSION INTRAVENOUS AS NEEDED
Status: DISCONTINUED | OUTPATIENT
Start: 2023-05-01 | End: 2023-05-01

## 2023-05-01 RX ORDER — LIDOCAINE HYDROCHLORIDE 20 MG/ML
INJECTION, SOLUTION EPIDURAL; INFILTRATION; INTRACAUDAL; PERINEURAL AS NEEDED
Status: DISCONTINUED | OUTPATIENT
Start: 2023-05-01 | End: 2023-05-01

## 2023-05-01 RX ORDER — FENTANYL CITRATE 50 UG/ML
25 INJECTION, SOLUTION INTRAMUSCULAR; INTRAVENOUS
Status: DISCONTINUED | OUTPATIENT
Start: 2023-05-01 | End: 2023-05-01 | Stop reason: HOSPADM

## 2023-05-01 RX ORDER — SODIUM CHLORIDE 9 MG/ML
125 INJECTION, SOLUTION INTRAVENOUS CONTINUOUS
Status: DISCONTINUED | OUTPATIENT
Start: 2023-05-01 | End: 2023-05-01 | Stop reason: HOSPADM

## 2023-05-01 RX ORDER — HYDROCODONE BITARTRATE AND ACETAMINOPHEN 5; 325 MG/1; MG/1
1 TABLET ORAL EVERY 6 HOURS PRN
Status: DISCONTINUED | OUTPATIENT
Start: 2023-05-01 | End: 2023-05-01 | Stop reason: HOSPADM

## 2023-05-01 RX ORDER — ONDANSETRON 2 MG/ML
INJECTION INTRAMUSCULAR; INTRAVENOUS AS NEEDED
Status: DISCONTINUED | OUTPATIENT
Start: 2023-05-01 | End: 2023-05-01

## 2023-05-01 RX ORDER — CEPHALEXIN 500 MG/1
500 CAPSULE ORAL EVERY 12 HOURS SCHEDULED
Qty: 6 CAPSULE | Refills: 0 | Status: SHIPPED | OUTPATIENT
Start: 2023-05-01 | End: 2023-05-04

## 2023-05-01 RX ORDER — ONDANSETRON 2 MG/ML
4 INJECTION INTRAMUSCULAR; INTRAVENOUS EVERY 6 HOURS PRN
Status: DISCONTINUED | OUTPATIENT
Start: 2023-05-01 | End: 2023-05-01 | Stop reason: HOSPADM

## 2023-05-01 RX ORDER — FENTANYL CITRATE 50 UG/ML
INJECTION, SOLUTION INTRAMUSCULAR; INTRAVENOUS AS NEEDED
Status: DISCONTINUED | OUTPATIENT
Start: 2023-05-01 | End: 2023-05-01

## 2023-05-01 RX ORDER — DEXAMETHASONE SODIUM PHOSPHATE 10 MG/ML
INJECTION, SOLUTION INTRAMUSCULAR; INTRAVENOUS AS NEEDED
Status: DISCONTINUED | OUTPATIENT
Start: 2023-05-01 | End: 2023-05-01

## 2023-05-01 RX ORDER — CEFAZOLIN SODIUM 1 G/50ML
1000 SOLUTION INTRAVENOUS EVERY 8 HOURS
Status: DISCONTINUED | OUTPATIENT
Start: 2023-05-01 | End: 2023-05-01 | Stop reason: HOSPADM

## 2023-05-01 RX ADMIN — HYDROCODONE BITARTRATE AND ACETAMINOPHEN 1 TABLET: 5; 325 TABLET ORAL at 11:31

## 2023-05-01 RX ADMIN — DEXAMETHASONE SODIUM PHOSPHATE 5 MG: 10 INJECTION INTRAMUSCULAR; INTRAVENOUS at 09:36

## 2023-05-01 RX ADMIN — SODIUM CHLORIDE 125 ML/HR: 0.9 INJECTION, SOLUTION INTRAVENOUS at 08:59

## 2023-05-01 RX ADMIN — LIDOCAINE HYDROCHLORIDE 100 MG: 20 INJECTION, SOLUTION EPIDURAL; INFILTRATION; INTRACAUDAL; PERINEURAL at 09:33

## 2023-05-01 RX ADMIN — PROPOFOL 200 MG: 10 INJECTION, EMULSION INTRAVENOUS at 09:33

## 2023-05-01 RX ADMIN — CEFAZOLIN SODIUM 1000 MG: 1 SOLUTION INTRAVENOUS at 09:21

## 2023-05-01 RX ADMIN — FENTANYL CITRATE 25 MCG: 50 INJECTION INTRAMUSCULAR; INTRAVENOUS at 10:10

## 2023-05-01 RX ADMIN — MIDAZOLAM 2 MG: 1 INJECTION INTRAMUSCULAR; INTRAVENOUS at 09:24

## 2023-05-01 RX ADMIN — FENTANYL CITRATE 25 MCG: 50 INJECTION INTRAMUSCULAR; INTRAVENOUS at 09:36

## 2023-05-01 RX ADMIN — ONDANSETRON 4 MG: 2 INJECTION INTRAMUSCULAR; INTRAVENOUS at 10:05

## 2023-05-01 NOTE — ANESTHESIA POSTPROCEDURE EVALUATION
Post-Op Assessment Note    CV Status:  Stable    Pain management: adequate     Mental Status:  Alert and awake   Hydration Status:  Euvolemic   PONV Controlled:  Controlled   Airway Patency:  Patent      Post Op Vitals Reviewed: Yes      Staff: Anesthesiologist         No notable events documented      /69 (rechecked on opposite arm d/t increaased BP) (05/01/23 1058)    Temp (!) 97 4 °F (36 3 °C) (05/01/23 1058)    Pulse 70 (05/01/23 1058)   Resp 15 (05/01/23 1058)    SpO2 96 % (05/01/23 1058)

## 2023-05-01 NOTE — INTERVAL H&P NOTE
H&P reviewed  After examining the patient I find no changes in the patients condition since the H&P had been written  Vitals:    05/01/23 0831   BP: 162/72   Pulse: 73   Resp: 16   Temp: 98 4 °F (36 9 °C)   SpO2: 97%     Patient now presents for 2nd stage ureteroscopy  Procedure, risks, and benefits discussed  Consent obtained for left ureteroscopy, laser, stone extraction, stent  All questions answered to their satisfaction

## 2023-05-01 NOTE — OP NOTE
OPERATIVE REPORT  PATIENT NAME: Jesus Freed    :  1955  MRN: [id number]  Pt Location: [facility] OR ROOM 05    SURGERY DATE: 2023    Surgeon(s) and Role:     Fay Hernandez MD - Primary    Preop Diagnosis:  Calculus of ureter [N20 1]    Post-Op Diagnosis Codes:     * Calculus of ureter [N20 1]    Procedure(s):  Left - CYSTO  URETEROSCOPY  W/  LASER  & LEFT STENT PLACEMENT    Specimen(s):  ID Type Source Tests Collected by Time Destination   A : Left ureter stone Calculus Kidney, Left STONE ANALYSIS Fay Hernandez MD 2023  9:55 AM        Estimated Blood Loss:   Minimal    Drains:  Ureteral Drain/Stent Right ureter 6 Fr  (Active)   Number of days: 4881       Anesthesia Type:   General    Operative Indications:  Calculus of ureter [N20 1]      Operative Findings:  Stones lasered and destroyed  Complications:   None    Procedure and Technique:  The patient was identified, brought to the operating room, and placed on the table in supine position  After induction of general anesthesia, the patient was placed in dorsal lithotomy position and prepped and draped in the usual sterile fashion  A complete formal timeout was performed  The 25 Australian rigid cystoscope was placed per urethra and cystoscopy was performed  There was no bladder abnormality identified  The left ureteral stent was identified and grasped with a grasper, brought out through the meatus, and cannulated with a Solo wire  A dual-lumen catheter was placed followed by a second guidewire and ureteral access sheath  The 8 5 Australian flexible ureteroscope was placed and the stones were identified within a lower pole renal calyx  A holmium laser fiber was placed and laser lithotripsy was performed  When the stones were of suitable size, a zero tip stone basket was placed and the larger stone fragments were retrieved  Remaining fragments less than 3 mm    At this point, a retrograde pyelogram was performed delineating the upper urinary tract anatomy  No further filling defect identified  The safety wire was backloaded into the cystoscope and a 26 cm x 6 Syriac double-J stent was replaced with string  The patient tolerated the procedure well and was transferred to the recovery room awake alert and in stable condition  Plan: stent/string until Friday 5/5  I was present for the entire procedure      Patient Disposition:  PACU         SIGNATURE: Vick Biggs MD  DATE: May 1, 2023  TIME: 10:20 AM

## 2023-05-01 NOTE — ANESTHESIA PREPROCEDURE EVALUATION
Procedure:  CYSTO USCOPE  W/  LASER, &STENT (Left: Bladder)    Relevant Problems   ANESTHESIA (within normal limits)      CARDIO   (+) Benign essential hypertension   (+) Hyperlipidemia      ENDO   (+) Type 2 diabetes mellitus (HCC)      GI/HEPATIC   (+) GERD (gastroesophageal reflux disease)      /RENAL   (+) Hydronephrosis with ureteropelvic junction (UPJ) obstruction   (+) Kidney stones      GYN (within normal limits)      HEMATOLOGY (within normal limits)      MUSCULOSKELETAL   (+) Cervical myofascial pain syndrome   (+) Cervical spondylosis   (+) Osteoarthritis of both knees      NEURO/PSYCH   (+) Cervical myofascial pain syndrome      PULMONARY   (+) Obstructive sleep apnea        Physical Exam    Airway    Mallampati score: II  TM Distance: >3 FB  Neck ROM: full     Dental   No notable dental hx     Cardiovascular  Rhythm: regular, Rate: normal, Cardiovascular exam normal    Pulmonary  Pulmonary exam normal Breath sounds clear to auscultation,     Other Findings        Anesthesia Plan  ASA Score- 2     Anesthesia Type- general with ASA Monitors  Additional Monitors:   Airway Plan: LMA  Plan Factors-Exercise tolerance (METS): >4 METS  Chart reviewed  EKG reviewed  Imaging results reviewed  Existing labs reviewed  Patient summary reviewed  Patient is not a current smoker  Patient not instructed to abstain from smoking on day of procedure  Patient did not smoke on day of surgery  Induction- intravenous  Postoperative Plan- Plan for postoperative opioid use  Informed Consent- Anesthetic plan and risks discussed with patient and spouse (Milli Bernabe)              Recent labs personally reviewed:  Lab Results   Component Value Date    WBC 12 21 (H) 04/11/2023    HGB 14 3 04/11/2023     04/11/2023     Lab Results   Component Value Date     01/11/2018    K 4 1 04/11/2023    BUN 23 04/11/2023    CREATININE 1 11 04/11/2023    GLUCOSE 101 02/12/2014     Lab Results   Component Value Date    PTT 28 04/08/2023      Lab Results   Component Value Date    INR 1 00 04/08/2023     Lab Results   Component Value Date    HGBA1C 5 7 (H) 01/09/2023       I, Mary Woods MD, have personally seen and evaluated the patient prior to anesthetic care  I have reviewed the pre-anesthetic record, and other medical records if appropriate to the anesthetic care  If a CRNA is involved in the case, I have reviewed the CRNA assessment, if present, and agree  Risks/benefits and alternatives discussed with patient including possible PONV, sore throat, and possibility of rare anesthetic and surgical emergencies

## 2023-05-05 ENCOUNTER — PROCEDURE VISIT (OUTPATIENT)
Dept: UROLOGY | Facility: AMBULATORY SURGERY CENTER | Age: 68
End: 2023-05-05

## 2023-05-05 VITALS
HEIGHT: 61 IN | WEIGHT: 177.9 LBS | HEART RATE: 68 BPM | BODY MASS INDEX: 33.59 KG/M2 | SYSTOLIC BLOOD PRESSURE: 122 MMHG | DIASTOLIC BLOOD PRESSURE: 60 MMHG

## 2023-05-05 DIAGNOSIS — N20.0 KIDNEY STONES: Primary | ICD-10-CM

## 2023-05-05 DIAGNOSIS — N39.0 URINARY TRACT INFECTION WITHOUT HEMATURIA, SITE UNSPECIFIED: ICD-10-CM

## 2023-05-05 NOTE — PROGRESS NOTES
"5/5/2023  Carin Lawler is a 69 y o  female  [id number]        Diagnosis  Chief Complaint    Stent removal; Post-op         Patient is s/p left CYSTO, URETEROSCOPY  W/  LASER, & LEFT STENT PLACEMENT (Left: Bladder) on 5/1/2023 with Dr Suhail Mitchell  Follow up as scheduled with [facility] in August   Advised to obtain KUB at least 2 weeks prior to her appointment  Educational handout provided and reviewed of s/s she may experience after stent removal   Advised to contact the office in the meantime with any questions or concerns  Procedure Stent with String Removal    Vitals:    05/05/23 1129   BP: 122/60   BP Location: Left arm   Patient Position: Sitting   Cuff Size: Adult   Pulse: 68   Weight: 80 7 kg (177 lb 14 4 oz)   Height: 5' 1\" (1 549 m)       Patient states she was admitted at the hospital for couple of days last time due to infection and she wants to make sure she still does not have the infection  Patient states she finished her antibiotic yesterday  Advised to wait 5 to 7 days and she can go for urine testing  Patient states she usually goes to [facility]  Urine testing scripts were provided and advised to have [facility] fax it over to our office for review  She is understanding  Stent with string removed intact without difficulty  Reviewed post stent removal symptoms including flank pain, dysuria, and hematuria  Instructed patient to increase oral fluid intake  Encouraged the use of NSAIDS and other prescribed pain medication as needed for discomfort  Patient instructed to call the office or report to the ER for uncontrolled pain, fever, chills, nausea or vomiting              FELA VieraN, RN  "

## 2023-05-06 LAB
CALCIUM OXALATE DIHYDRATE MFR STONE IR: 30 %
COLOR STONE: NORMAL
COM MFR STONE: 65 %
COMMENT-STONE3: NORMAL
COMPOSITION: NORMAL
HYDROXYAPATITE 24H ENGDIFF UR: 5 %
LABORATORY COMMENT REPORT: NORMAL
PHOTO: NORMAL
SIZE STONE: NORMAL MM
SPEC SOURCE SUBJ: NORMAL
STONE ANALYSIS-IMP: NORMAL
WT STONE: 29 MG

## 2023-05-08 ENCOUNTER — OFFICE VISIT (OUTPATIENT)
Dept: FAMILY MEDICINE CLINIC | Facility: CLINIC | Age: 68
End: 2023-05-08

## 2023-05-08 VITALS
HEART RATE: 71 BPM | TEMPERATURE: 97.8 F | BODY MASS INDEX: 33.61 KG/M2 | DIASTOLIC BLOOD PRESSURE: 78 MMHG | OXYGEN SATURATION: 96 % | RESPIRATION RATE: 18 BRPM | HEIGHT: 61 IN | WEIGHT: 178 LBS | SYSTOLIC BLOOD PRESSURE: 140 MMHG

## 2023-05-08 DIAGNOSIS — Q62.11 HYDRONEPHROSIS WITH URETEROPELVIC JUNCTION (UPJ) OBSTRUCTION: ICD-10-CM

## 2023-05-08 DIAGNOSIS — R78.81 BACTEREMIA, ESCHERICHIA COLI: Primary | ICD-10-CM

## 2023-05-08 DIAGNOSIS — B96.20 BACTEREMIA, ESCHERICHIA COLI: Primary | ICD-10-CM

## 2023-05-08 DIAGNOSIS — N20.0 KIDNEY STONES: ICD-10-CM

## 2023-05-08 NOTE — PROGRESS NOTES
FAMILY PRACTICE OFFICE VISIT       NAME: Sincere Kellogg  AGE: 69 y o  SEX: female       : 1955        MRN: [id number]    Assessment and Plan   1  Bacteremia, escherichia coli    2  Hydronephrosis with ureteropelvic junction (UPJ) obstruction    3  Kidney stones     She recently was hospitalized for e coli bacteremia secondary to UTI, renal stones  Treated with Keflex  Blood cultures on  1230 am were positive for e coli  Blood cultures repeated  at 1030 am, were negative for 5 days  Urine culture  + e coli  Repeat urine culture  +e coli  Treated inpatient with rocephin, cefepime, ancef, followed by oral Keflex  More keflex was prescribed with + urine culture on , with an additional 3 days of keflex prescribed post surgery  She is feeling better, but not 100%, feels sleepy, like she needs more sleep  She has repeat urine culture this week  She will continue urology follow up  Chief Complaint     Chief Complaint   Patient presents with   • ER-Follow up       History of Present Illness     Sincere Kellogg is a 69year old female presenting today for follow up on kidney stones  She had cystoscopy ureteroscopy, with lithotripsy and left stent placement on May 1  She recently was hospitalized for e coli bacteremia secondary to UTI, renal stones  Treated with Keflex  Blood cultures on  1230 am were positive for e coli  Blood cultures repeated  at 1030 am, were negative for 5 days  Urine culture  + e coli  Repeat urine culture  +e coli  Treated inpatient with rocephin, cefepime, ancef, followed by oral Keflex  More keflex was prescribed with + urine culture on , with an additional 3 days of keflex prescribed post surgery  Occasioanl left flank pain  KUB is planned for 3 months  Has urine culture and UA micro to do this week  Fatigue improving, more sleepy  Feeling better overall, but not 100%      Routine "follow up as scheduled in July  Review of Systems   Review of Systems   Constitutional: Negative  HENT: Negative  Respiratory: Negative  Cardiovascular: Negative  Gastrointestinal: Negative  Genitourinary: Positive for flank pain (left intermittent)  Skin: Negative  Neurological: Negative  Hematological: Negative  Psychiatric/Behavioral: Negative  I have reviewed the patient's medical history in detail; there are no changes to the history as noted in the electronic medical record  Objective     Vitals:    05/08/23 0815   BP: 140/78   BP Location: Left arm   Patient Position: Sitting   Cuff Size: Large   Pulse: 71   Resp: 18   Temp: 97 8 °F (36 6 °C)   TempSrc: Temporal   SpO2: 96%   Weight: 80 7 kg (178 lb)   Height: 5' 1\" (1 549 m)     Wt Readings from Last 3 Encounters:   05/08/23 80 7 kg (178 lb)   05/05/23 80 7 kg (177 lb 14 4 oz)   05/01/23 79 8 kg (175 lb 14 8 oz)     Physical Exam  Vitals and nursing note reviewed  Constitutional:       General: She is not in acute distress  Appearance: Normal appearance  She is not ill-appearing  HENT:      Head: Atraumatic  Cardiovascular:      Rate and Rhythm: Normal rate and regular rhythm  Heart sounds: No murmur heard  Pulmonary:      Effort: Pulmonary effort is normal  No respiratory distress  Breath sounds: Normal breath sounds  Abdominal:      Palpations: Abdomen is soft  Tenderness: There is no abdominal tenderness  There is no right CVA tenderness or left CVA tenderness  Musculoskeletal:      Right lower leg: No edema  Left lower leg: No edema  Neurological:      Mental Status: She is alert     Psychiatric:         Mood and Affect: Mood normal             ALLERGIES:  Allergies   Allergen Reactions   • Atorvastatin    • Sulfa Antibiotics Myalgia   • Sulfamethopyrazine      Joint stiffness   • Sulfamethoxazole-Trimethoprim Other (See Comments)     Joint pain       Current Medications " Current Outpatient Medications   Medication Sig Dispense Refill   • acetaminophen (TYLENOL) 500 mg tablet Take 500 mg by mouth every 6 (six) hours as needed for mild pain     • amLODIPine (NORVASC) 10 mg tablet TAKE 1 TABLET BY MOUTH EVERY DAY WITH DINNER 90 tablet 3   • Blood Glucose Monitoring Suppl (OneTouch Verio Flex System) w/Device KIT USE DAILY AND WHEN HAVING SYMPTOMS AS DIRECTED TO CHECK BLOOD SUGARS 1 kit 0   • Cholecalciferol (VITAMIN D3) 1000 units CAPS Take by mouth     • dulaglutide (Trulicity) 9 82 LD/9 4EH injection Inject 0 5 mL (0 75 mg total) under the skin every 7 days 6 mL 3   • famotidine (PEPCID) 10 mg tablet Take 10 mg by mouth daily as needed      • Flaxseed Oil OIL by Does not apply route     • fluticasone (FLONASE) 50 mcg/act nasal spray 2 sprays into each nostril daily as needed      • glucose blood (OneTouch Verio) test strip Use daily and when having symptoms as directed to check blood sugars 100 each PRN   • ibuprofen (MOTRIN) 200 mg tablet Take by mouth every 6 (six) hours as needed for mild pain     • Lactobacillus (PROBIOTIC ACIDOPHILUS) CAPS Take 1 tablet daily as supplement       • Lancet Devices (ONE TOUCH DELICA LANCING DEV) MISC Use daily and when having symptoms as directed to check blood sugars 100 each PRN   • Lancets (OneTouch Delica Plus [id number]) MISC Use daily and when having symptoms as directed to check blood sugars 100 each 3   • lisinopril (ZESTRIL) 40 mg tablet TAKE 1 TABLET (40 MG TOTAL) BY MOUTH DAILY IN THE EARLY MORNING 90 tablet 3   • loratadine (CLARITIN) 10 mg tablet Take 1 tablet by mouth daily as needed     • metoprolol tartrate (LOPRESSOR) 50 mg tablet Take 1 tablet (50 mg total) by mouth every 12 (twelve) hours 180 tablet 3   • pravastatin (PRAVACHOL) 20 mg tablet TAKE 1 TABLET BY MOUTH DAILY AT BEDTIME 90 tablet 3   • repaglinide (PRANDIN) 0 5 mg tablet TAKE 1 TABLET (0 5 MG TOTAL) BY MOUTH DAILY WITH DINNER 90 tablet 3   • nitrofurantoin (MACROBID) 100 mg capsule Take 1 capsule (100 mg total) by mouth 2 (two) times a day for 7 days 14 capsule 0     No current facility-administered medications for this visit           Health Maintenance     Health Maintenance   Topic Date Due   • Kidney Health Evaluation: Microalbumin/Creatinine Ratio  08/23/2019   • PT PLAN OF CARE  10/22/2020   • COVID-19 Vaccine (4 - Booster for Pfizer series) 02/10/2022   • HEMOGLOBIN A1C  07/09/2023   • BMI: Followup Plan  09/12/2023   • Urinary Incontinence Screening  09/12/2023   • Medicare Annual Wellness Visit (AWV)  09/12/2023   • Kidney Health Evaluation: GFR  04/11/2024   • Depression Screening  04/13/2024   • Diabetic Foot Exam  04/13/2024   • Fall Risk  05/08/2024   • BMI: Adult  05/08/2024   • DM Eye Exam  08/03/2024   • Breast Cancer Screening: Mammogram  01/25/2025   • Colorectal Cancer Screening  11/02/2025   • Hepatitis C Screening  Completed   • Osteoporosis Screening  Completed   • Pneumococcal Vaccine: 65+ Years  Completed   • Influenza Vaccine  Completed   • HIB Vaccine  Aged Out   • IPV Vaccine  Aged Out   • Hepatitis A Vaccine  Aged Out   • Meningococcal ACWY Vaccine  Aged Out   • HPV Vaccine  Aged Out     Immunization History   Administered Date(s) Administered   • COVID-19 PFIZER VACCINE 0 3 ML IM 04/10/2021, 05/01/2021, 12/16/2021   • INFLUENZA 01/05/2016, 10/18/2018, 09/18/2022   • Influenza Quadrivalent Preservative Free 3 years and older IM 10/04/2017   • Influenza Split High Dose Preservative Free IM 10/11/2019   • Influenza, high dose seasonal 0 7 mL 09/08/2020, 09/10/2021   • Influenza, seasonal, injectable 09/01/2014, 01/05/2016, 10/26/2016   • Pneumococcal Conjugate 13-Valent 12/18/2018   • Pneumococcal Polysaccharide PPV23 07/12/2016, 03/22/2021   • Tdap 07/17/2018   • Zoster 01/01/2014, 08/30/2016       JOSHUA Boucher

## 2023-05-12 ENCOUNTER — TELEPHONE (OUTPATIENT)
Dept: UROLOGY | Facility: AMBULATORY SURGERY CENTER | Age: 68
End: 2023-05-12

## 2023-05-12 DIAGNOSIS — N39.0 URINARY TRACT INFECTION WITHOUT HEMATURIA, SITE UNSPECIFIED: Primary | ICD-10-CM

## 2023-05-12 LAB
APPEARANCE UR: ABNORMAL
BACTERIA UR QL AUTO: ABNORMAL /HPF
BILIRUB UR QL STRIP: NEGATIVE
COLOR UR: YELLOW
GLUCOSE UR QL STRIP: NEGATIVE
HGB UR QL STRIP: ABNORMAL
HYALINE CASTS #/AREA URNS LPF: ABNORMAL /LPF
KETONES UR QL STRIP: NEGATIVE
LEUKOCYTE ESTERASE UR QL STRIP: ABNORMAL
NITRITE UR QL STRIP: POSITIVE
PH UR STRIP: 5.5 [PH] (ref 5–8)
PROT UR QL STRIP: ABNORMAL
RBC #/AREA URNS HPF: ABNORMAL /HPF
SP GR UR STRIP: 1.01 (ref 1–1.03)
SQUAMOUS #/AREA URNS HPF: ABNORMAL /HPF
WBC #/AREA URNS HPF: ABNORMAL /HPF

## 2023-05-12 RX ORDER — NITROFURANTOIN 25; 75 MG/1; MG/1
100 CAPSULE ORAL 2 TIMES DAILY
Qty: 14 CAPSULE | Refills: 0 | Status: SHIPPED | OUTPATIENT
Start: 2023-05-12 | End: 2023-05-19

## 2023-05-26 ENCOUNTER — TELEPHONE (OUTPATIENT)
Dept: OTHER | Facility: OTHER | Age: 68
End: 2023-05-26

## 2023-05-26 DIAGNOSIS — R39.9 UTI SYMPTOMS: Primary | ICD-10-CM

## 2023-05-26 NOTE — TELEPHONE ENCOUNTER
Pt  Would like to know if you could pls place an order for a test to make sure her infection is all cleared up  Pt  Is requesting a call so she knows if its at the lab  Please leave a message if she is not home   Ty

## 2023-05-26 NOTE — TELEPHONE ENCOUNTER
Spoke to patient and urine culture script  has been placed into her chart  Confirmed email on file as patient goes to [facility] labs for her testing

## 2023-07-08 LAB
ALBUMIN SERPL-MCNC: 4.4 G/DL (ref 3.6–5.1)
ALBUMIN/CREAT UR: 9 MCG/MG CREAT
ALBUMIN/GLOB SERPL: 1.8 (CALC) (ref 1–2.5)
ALP SERPL-CCNC: 73 U/L (ref 37–153)
ALT SERPL-CCNC: 18 U/L (ref 6–29)
AST SERPL-CCNC: 14 U/L (ref 10–35)
BASOPHILS # BLD AUTO: 58 CELLS/UL (ref 0–200)
BASOPHILS NFR BLD AUTO: 1.2 %
BILIRUB SERPL-MCNC: 0.6 MG/DL (ref 0.2–1.2)
BUN SERPL-MCNC: 25 MG/DL (ref 7–25)
BUN/CREAT SERPL: ABNORMAL (CALC) (ref 6–22)
CALCIUM SERPL-MCNC: 9.7 MG/DL (ref 8.6–10.4)
CHLORIDE SERPL-SCNC: 107 MMOL/L (ref 98–110)
CHOLEST SERPL-MCNC: 175 MG/DL
CHOLEST/HDLC SERPL: 2.7 (CALC)
CO2 SERPL-SCNC: 30 MMOL/L (ref 20–32)
CREAT SERPL-MCNC: 0.98 MG/DL (ref 0.5–1.05)
CREAT UR-MCNC: 66 MG/DL (ref 20–275)
EOSINOPHIL # BLD AUTO: 130 CELLS/UL (ref 15–500)
EOSINOPHIL NFR BLD AUTO: 2.7 %
ERYTHROCYTE [DISTWIDTH] IN BLOOD BY AUTOMATED COUNT: 13.3 % (ref 11–15)
GFR/BSA.PRED SERPLBLD CYS-BASED-ARV: 62 ML/MIN/1.73M2
GLOBULIN SER CALC-MCNC: 2.5 G/DL (CALC) (ref 1.9–3.7)
GLUCOSE SERPL-MCNC: 160 MG/DL (ref 65–99)
HBA1C MFR BLD: 5.9 % OF TOTAL HGB
HCT VFR BLD AUTO: 46.9 % (ref 35–45)
HDLC SERPL-MCNC: 65 MG/DL
HGB BLD-MCNC: 15.6 G/DL (ref 11.7–15.5)
LDLC SERPL CALC-MCNC: 89 MG/DL (CALC)
LYMPHOCYTES # BLD AUTO: 1310 CELLS/UL (ref 850–3900)
LYMPHOCYTES NFR BLD AUTO: 27.3 %
MCH RBC QN AUTO: 29.7 PG (ref 27–33)
MCHC RBC AUTO-ENTMCNC: 33.3 G/DL (ref 32–36)
MCV RBC AUTO: 89.3 FL (ref 80–100)
MICROALBUMIN UR-MCNC: 0.6 MG/DL
MONOCYTES # BLD AUTO: 557 CELLS/UL (ref 200–950)
MONOCYTES NFR BLD AUTO: 11.6 %
NEUTROPHILS # BLD AUTO: 2746 CELLS/UL (ref 1500–7800)
NEUTROPHILS NFR BLD AUTO: 57.2 %
NONHDLC SERPL-MCNC: 110 MG/DL (CALC)
PLATELET # BLD AUTO: 301 THOUSAND/UL (ref 140–400)
PMV BLD REES-ECKER: 11.8 FL (ref 7.5–12.5)
POTASSIUM SERPL-SCNC: 4.5 MMOL/L (ref 3.5–5.3)
PROT SERPL-MCNC: 6.9 G/DL (ref 6.1–8.1)
RBC # BLD AUTO: 5.25 MILLION/UL (ref 3.8–5.1)
SODIUM SERPL-SCNC: 144 MMOL/L (ref 135–146)
TRIGL SERPL-MCNC: 114 MG/DL
TSH SERPL-ACNC: 0.96 MIU/L (ref 0.4–4.5)
WBC # BLD AUTO: 4.8 THOUSAND/UL (ref 3.8–10.8)

## 2023-07-10 ENCOUNTER — OFFICE VISIT (OUTPATIENT)
Dept: CARDIOLOGY CLINIC | Facility: CLINIC | Age: 68
End: 2023-07-10
Payer: COMMERCIAL

## 2023-07-10 VITALS
BODY MASS INDEX: 33.89 KG/M2 | WEIGHT: 179.5 LBS | OXYGEN SATURATION: 97 % | SYSTOLIC BLOOD PRESSURE: 142 MMHG | HEART RATE: 75 BPM | DIASTOLIC BLOOD PRESSURE: 82 MMHG | HEIGHT: 61 IN

## 2023-07-10 DIAGNOSIS — E66.09 CLASS 1 OBESITY DUE TO EXCESS CALORIES WITH SERIOUS COMORBIDITY AND BODY MASS INDEX (BMI) OF 33.0 TO 33.9 IN ADULT: ICD-10-CM

## 2023-07-10 DIAGNOSIS — G47.33 OBSTRUCTIVE SLEEP APNEA: ICD-10-CM

## 2023-07-10 DIAGNOSIS — E78.2 MIXED HYPERLIPIDEMIA: ICD-10-CM

## 2023-07-10 DIAGNOSIS — I10 BENIGN ESSENTIAL HYPERTENSION: Primary | ICD-10-CM

## 2023-07-10 PROCEDURE — 99214 OFFICE O/P EST MOD 30 MIN: CPT | Performed by: INTERNAL MEDICINE

## 2023-07-10 RX ORDER — CARVEDILOL 6.25 MG/1
6.25 TABLET ORAL 2 TIMES DAILY WITH MEALS
Qty: 180 TABLET | Refills: 3 | Status: SHIPPED | OUTPATIENT
Start: 2023-07-10
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
## 2023-07-12 ENCOUNTER — OFFICE VISIT (OUTPATIENT)
Dept: FAMILY MEDICINE CLINIC | Facility: CLINIC | Age: 68
End: 2023-07-12
Payer: COMMERCIAL

## 2023-07-12 VITALS
RESPIRATION RATE: 16 BRPM | SYSTOLIC BLOOD PRESSURE: 140 MMHG | TEMPERATURE: 97.8 F | HEIGHT: 61 IN | OXYGEN SATURATION: 98 % | BODY MASS INDEX: 33.99 KG/M2 | DIASTOLIC BLOOD PRESSURE: 100 MMHG | HEART RATE: 71 BPM | WEIGHT: 180 LBS

## 2023-07-12 DIAGNOSIS — M85.80 OSTEOPENIA, UNSPECIFIED LOCATION: ICD-10-CM

## 2023-07-12 DIAGNOSIS — E11.9 TYPE 2 DIABETES MELLITUS WITHOUT COMPLICATION, WITHOUT LONG-TERM CURRENT USE OF INSULIN (HCC): Primary | ICD-10-CM

## 2023-07-12 DIAGNOSIS — K21.9 GASTROESOPHAGEAL REFLUX DISEASE WITHOUT ESOPHAGITIS: ICD-10-CM

## 2023-07-12 DIAGNOSIS — I10 BENIGN ESSENTIAL HYPERTENSION: ICD-10-CM

## 2023-07-12 DIAGNOSIS — G47.33 OBSTRUCTIVE SLEEP APNEA: ICD-10-CM

## 2023-07-12 DIAGNOSIS — Z78.0 POST-MENOPAUSAL: ICD-10-CM

## 2023-07-12 DIAGNOSIS — E78.2 MIXED HYPERLIPIDEMIA: ICD-10-CM

## 2023-07-12 PROBLEM — R78.81 BACTEREMIA, ESCHERICHIA COLI: Status: RESOLVED | Noted: 2023-04-08 | Resolved: 2023-07-12

## 2023-07-12 PROBLEM — Q62.11 HYDRONEPHROSIS WITH URETEROPELVIC JUNCTION (UPJ) OBSTRUCTION: Status: RESOLVED | Noted: 2023-04-08 | Resolved: 2023-07-12

## 2023-07-12 PROBLEM — B96.20 BACTEREMIA, ESCHERICHIA COLI: Status: RESOLVED | Noted: 2023-04-08 | Resolved: 2023-07-12

## 2023-07-12 PROBLEM — A41.9 SEPSIS WITHOUT ACUTE ORGAN DYSFUNCTION (HCC): Status: RESOLVED | Noted: 2023-04-08 | Resolved: 2023-07-12

## 2023-07-12 PROBLEM — M72.2 PLANTAR FASCIITIS OF RIGHT FOOT: Status: RESOLVED | Noted: 2022-06-02 | Resolved: 2023-07-12

## 2023-07-12 PROCEDURE — 99214 OFFICE O/P EST MOD 30 MIN: CPT | Performed by: NURSE PRACTITIONER

## 2023-07-12 NOTE — ASSESSMENT & PLAN NOTE
Lab Results   Component Value Date    HGBA1C 5.9 (H) 07/07/2023     A1c is well controlled on Prandin and Trulicity.

## 2023-07-12 NOTE — ASSESSMENT & PLAN NOTE
Blood pressure is elevated. Metoprolol changed to Coreg by cardiology. Has not started yet, due to she has a lot of driving to do this weekend, and will start after she is done her traveling.

## 2023-08-07 ENCOUNTER — HOSPITAL ENCOUNTER (OUTPATIENT)
Dept: NON INVASIVE DIAGNOSTICS | Facility: CLINIC | Age: 68
Discharge: HOME | End: 2023-08-07
Payer: COMMERCIAL

## 2023-08-07 VITALS
HEART RATE: 80 BPM | SYSTOLIC BLOOD PRESSURE: 140 MMHG | BODY MASS INDEX: 33.99 KG/M2 | WEIGHT: 180 LBS | DIASTOLIC BLOOD PRESSURE: 100 MMHG | HEIGHT: 61 IN

## 2023-08-07 DIAGNOSIS — I10 BENIGN ESSENTIAL HYPERTENSION: ICD-10-CM

## 2023-08-07 DIAGNOSIS — E78.2 MIXED HYPERLIPIDEMIA: ICD-10-CM

## 2023-08-07 LAB
AORTIC ROOT: 3.1 CM
AORTIC VALVE MEAN VELOCITY: 9.6 M/S
APICAL FOUR CHAMBER EJECTION FRACTION: 65 %
ASCENDING AORTA: 3 CM
AV AREA BY CONTINUOUS VTI: 2.5 CM2
AV AREA PEAK VELOCITY: 2.7 CM2
AV LVOT MEAN GRADIENT: 4 MMHG
AV LVOT PEAK GRADIENT: 8 MMHG
AV MEAN GRADIENT: 4 MMHG
AV PEAK GRADIENT: 9 MMHG
AV VALVE AREA: 2.47 CM2
AV VELOCITY RATIO: 0.95
DOP CALC AO PEAK VEL: 1.48 M/S
DOP CALC AO VTI: 31.82 CM
DOP CALC LVOT AREA: 2.83 CM2
DOP CALC LVOT CARDIAC INDEX: 3.38 L/MIN/M2
DOP CALC LVOT CARDIAC OUTPUT: 6.12 L/MIN
DOP CALC LVOT DIAMETER: 1.9 CM
DOP CALC LVOT PEAK VEL VTI: 27.68 CM
DOP CALC LVOT PEAK VEL: 1.4 M/S
DOP CALC LVOT STROKE INDEX: 43.6 ML/M2
DOP CALC LVOT STROKE VOLUME: 78.44 CM3
E WAVE DECELERATION TIME: 154 MS
FRACTIONAL SHORTENING: 42 % (ref 28–44)
INTERVENTRICULAR SEPTUM IN DIASTOLE (PARASTERNAL SHORT AXIS VIEW): 1.1 CM
INTERVENTRICULAR SEPTUM: 1.1 CM (ref 0.6–1.1)
LAAS-AP2: 23.2 CM2
LAAS-AP4: 17.4 CM2
LEFT ATRIUM SIZE: 4 CM
LEFT ATRIUM VOLUME (MOD BIPLANE): 58 ML
LEFT INTERNAL DIMENSION IN SYSTOLE: 2.5 CM (ref 2.1–4)
LEFT VENTRICULAR INTERNAL DIMENSION IN DIASTOLE: 4.3 CM (ref 3.5–6)
LEFT VENTRICULAR POSTERIOR WALL IN END DIASTOLE: 1.1 CM
LEFT VENTRICULAR STROKE VOLUME: 63 ML
LVSV (TEICH): 63 ML
MV E'TISSUE VEL-LAT: 6 CM/S
MV E'TISSUE VEL-SEP: 7 CM/S
MV PEAK A VEL: 1.2 M/S
MV PEAK E VEL: 81 CM/S
MV STENOSIS PRESSURE HALF TIME: 45 MS
MV VALVE AREA P 1/2 METHOD: 4.89 CM2
RA PRESSURE ESTIMATED: 3 MMHG
RIGHT ATRIUM AREA SYSTOLE A4C: 14 CM2
RIGHT VENTRICLE ID DIMENSION: 2.2 CM
RV PSP: 35 MMHG
SL CV LEFT ATRIUM LENGTH A2C: 5.6 CM
SL CV LV EF: 60
SL CV PED ECHO LEFT VENTRICLE DIASTOLIC VOLUME (MOD BIPLANE) 2D: 84 ML
SL CV PED ECHO LEFT VENTRICLE SYSTOLIC VOLUME (MOD BIPLANE) 2D: 21 ML
TR MAX PG: 32 MMHG
TR PEAK VELOCITY: 2.8 M/S
TRICUSPID ANNULAR PLANE SYSTOLIC EXCURSION: 1.5 CM
TRICUSPID VALVE PEAK REGURGITATION VELOCITY: 2.83 M/S

## 2023-08-07 PROCEDURE — 93306 TTE W/DOPPLER COMPLETE: CPT | Performed by: INTERNAL MEDICINE

## 2023-08-07 PROCEDURE — 93880 EXTRACRANIAL BILAT STUDY: CPT | Performed by: SURGERY

## 2023-08-07 PROCEDURE — 93880 EXTRACRANIAL BILAT STUDY: CPT

## 2023-08-07 PROCEDURE — 93306 TTE W/DOPPLER COMPLETE: CPT

## 2023-08-14 ENCOUNTER — HOSPITAL ENCOUNTER (OUTPATIENT)
Dept: RADIOLOGY | Facility: HOSPITAL | Age: 68
Discharge: HOME | End: 2023-08-14
Payer: COMMERCIAL

## 2023-08-14 DIAGNOSIS — N20.0 KIDNEY STONES: ICD-10-CM

## 2023-08-14 PROCEDURE — 74018 RADEX ABDOMEN 1 VIEW: CPT

## 2023-08-16 LAB
LEFT EYE DIABETIC RETINOPATHY: NORMAL
RIGHT EYE DIABETIC RETINOPATHY: NORMAL

## 2023-08-16 PROCEDURE — 2023F DILAT RTA XM W/O RTNOPTHY: CPT | Performed by: NURSE PRACTITIONER

## 2023-08-21 ENCOUNTER — OFFICE VISIT (OUTPATIENT)
Dept: FAMILY MEDICINE CLINIC | Facility: CLINIC | Age: 68
End: 2023-08-21
Payer: COMMERCIAL

## 2023-08-21 VITALS
WEIGHT: 184 LBS | RESPIRATION RATE: 16 BRPM | HEART RATE: 87 BPM | HEIGHT: 61 IN | TEMPERATURE: 98 F | OXYGEN SATURATION: 98 % | DIASTOLIC BLOOD PRESSURE: 80 MMHG | SYSTOLIC BLOOD PRESSURE: 120 MMHG | BODY MASS INDEX: 34.74 KG/M2

## 2023-08-21 DIAGNOSIS — E11.9 TYPE 2 DIABETES MELLITUS WITHOUT COMPLICATION, WITHOUT LONG-TERM CURRENT USE OF INSULIN (HCC): ICD-10-CM

## 2023-08-21 DIAGNOSIS — M54.42 ACUTE LEFT-SIDED LOW BACK PAIN WITH LEFT-SIDED SCIATICA: Primary | ICD-10-CM

## 2023-08-21 DIAGNOSIS — I10 ESSENTIAL HYPERTENSION: ICD-10-CM

## 2023-08-21 PROCEDURE — 99213 OFFICE O/P EST LOW 20 MIN: CPT | Performed by: NURSE PRACTITIONER

## 2023-08-21 RX ORDER — METHYLPREDNISOLONE 4 MG/1
TABLET ORAL
Qty: 21 EACH | Refills: 0 | Status: SHIPPED | OUTPATIENT
Start: 2023-08-21

## 2023-08-21 RX ORDER — METHOCARBAMOL 500 MG/1
500 TABLET, FILM COATED ORAL 2 TIMES DAILY PRN
Qty: 30 TABLET | Refills: 0 | Status: SHIPPED | OUTPATIENT
Start: 2023-08-21

## 2023-08-21 RX ORDER — BLOOD SUGAR DIAGNOSTIC
STRIP MISCELLANEOUS
Qty: 100 STRIP | Status: SHIPPED | OUTPATIENT
Start: 2023-08-21

## 2023-08-21 RX ORDER — LISINOPRIL 40 MG/1
40 TABLET ORAL
Qty: 90 TABLET | Refills: 3 | Status: SHIPPED | OUTPATIENT
Start: 2023-08-21

## 2023-08-21 NOTE — PROGRESS NOTES
FAMILY PRACTICE OFFICE VISIT       NAME: Regina Lara  AGE: 69 y.o. SEX: female       : 1955        MRN: [id number]    Assessment and Plan   1. Acute left-sided low back pain with left-sided sciatica  -     methylPREDNISolone 4 MG tablet therapy pack; Use as directed on package  -     methocarbamol (ROBAXIN) 500 mg tablet; Take 1 tablet (500 mg total) by mouth 2 (two) times a day as needed for muscle spasms       Start medrol dose pack. Start methocarbamol as needed, may cause sedation--no driving when taking this. If pain is not improving by end of week with medications, advised to call, and will proceed with imaging. Chief Complaint     Chief Complaint   Patient presents with   • Back Pain     Pt is here for back injury and left left leg  1 + wk       History of Present Illness     Regina Lara is a 69year old female presenting today for back pain, left leg pain. One week ago, she was spraying ants outside on her steps. Went to step up one step with her left leg, and felt a pop in her back, pain started with this. Left low back pain and down leg. Sharp pain down back of leg to knee. Worse with certain movements, and positions. Tuning over in bed hurts. Leaning back hurts, can't bend over. Stairs worse. She is able to find comfortable positions. Went to chiropractor, Wednesday, last week. Helped some. She is returning to chiropractor later this week. Using: ice, heat,  2 motrin and 1 extra tylneol after lunch. Night time taking 4 motrin. No weakness. No loss of bowel or bladder control. No paresthesia. Review of Systems   Review of Systems   Constitutional: Negative. Musculoskeletal: Positive for back pain. Neurological: Negative for weakness and numbness. I have reviewed the patient's medical history in detail; there are no changes to the history as noted in the electronic medical record.     Objective     Vitals:    23 1049   BP: 120/80   Pulse: 87   Resp: 16   Temp: 98 °F (36.7 °C)   TempSrc: Temporal   SpO2: 98%   Weight: 83.5 kg (184 lb)   Height: 5' 1" (1.549 m)     Wt Readings from Last 3 Encounters:   08/21/23 83.5 kg (184 lb)   08/07/23 81.6 kg (180 lb)   07/12/23 81.6 kg (180 lb)     Physical Exam  Vitals and nursing note reviewed. Constitutional:       General: She is not in acute distress. Appearance: Normal appearance. She is well-developed. She is not ill-appearing. HENT:      Head: Normocephalic and atraumatic. Neck:      Thyroid: No thyromegaly. Cardiovascular:      Rate and Rhythm: Normal rate and regular rhythm. Heart sounds: No murmur heard. Pulmonary:      Effort: Pulmonary effort is normal. No respiratory distress. Breath sounds: Normal breath sounds. Musculoskeletal:      Cervical back: Normal range of motion and neck supple. Lumbar back: Tenderness present. Positive left straight leg raise test. Negative right straight leg raise test.        Back:       Right lower leg: No edema. Left lower leg: No edema. Comments: Area of tenderness noted on diagram.   Lymphadenopathy:      Cervical: No cervical adenopathy. Skin:     Findings: No rash. Neurological:      Mental Status: She is alert. Comments: Bilateral knee flexion strength 5/5. Bilateral knee extension strength 5/5. Bilateral hip flexion strength 5/5. Bilateral plantar flexion strength 5/5. Bilateral dorsiflexion strength 5/5. Bilateral lower extremity sensation intact.       Psychiatric:         Mood and Affect: Mood normal.            ALLERGIES:  Allergies   Allergen Reactions   • Atorvastatin    • Sulfa Antibiotics Myalgia   • Sulfamethopyrazine      Joint stiffness   • Sulfamethoxazole-Trimethoprim Other (See Comments)     Joint pain       Current Medications     Current Outpatient Medications   Medication Sig Dispense Refill   • acetaminophen (TYLENOL) 500 mg tablet Take 500 mg by mouth every 6 (six) hours as needed for mild pain     • amLODIPine (NORVASC) 10 mg tablet TAKE 1 TABLET BY MOUTH EVERY DAY WITH DINNER 90 tablet 3   • Blood Glucose Monitoring Suppl (OneTouch Verio Flex System) w/Device KIT USE DAILY AND WHEN HAVING SYMPTOMS AS DIRECTED TO CHECK BLOOD SUGARS 1 kit 0   • carvedilol (COREG) 6.25 mg tablet Take 1 tablet (6.25 mg total) by mouth 2 (two) times a day with meals 180 tablet 3   • Cholecalciferol (VITAMIN D3) 1000 units CAPS Take by mouth     • dulaglutide (Trulicity) 9.15 MARCELO/1.8WN injection Inject 0.5 mL (0.75 mg total) under the skin every 7 days 6 mL 3   • famotidine (PEPCID) 10 mg tablet Take 10 mg by mouth daily as needed      • Flaxseed Oil OIL by Does not apply route     • fluticasone (FLONASE) 50 mcg/act nasal spray 2 sprays into each nostril daily as needed      • ibuprofen (MOTRIN) 200 mg tablet Take by mouth every 6 (six) hours as needed for mild pain     • Lactobacillus (PROBIOTIC ACIDOPHILUS) CAPS Take 1 tablet daily as supplement.      • Lancet Devices (ONE TOUCH DELICA LANCING DEV) MISC Use daily and when having symptoms as directed to check blood sugars 100 each PRN   • Lancets (OneTouch Delica Plus [id number]) MISC Use daily and when having symptoms as directed to check blood sugars 100 each 3   • lisinopril (ZESTRIL) 40 mg tablet TAKE 1 TABLET (40 MG TOTAL) BY MOUTH DAILY IN THE EARLY MORNING 90 tablet 3   • loratadine (CLARITIN) 10 mg tablet Take 1 tablet by mouth daily as needed     • methocarbamol (ROBAXIN) 500 mg tablet Take 1 tablet (500 mg total) by mouth 2 (two) times a day as needed for muscle spasms 30 tablet 0   • methylPREDNISolone 4 MG tablet therapy pack Use as directed on package 21 each 0   • OneTouch Verio test strip USE DAILY AND WHEN HAVING SYMPTOMS AS DIRECTED TO CHECK BLOOD SUGARS 100 strip PRN   • pravastatin (PRAVACHOL) 20 mg tablet TAKE 1 TABLET BY MOUTH DAILY AT BEDTIME 90 tablet 3   • repaglinide (PRANDIN) 0.5 mg tablet TAKE 1 TABLET (0.5 MG TOTAL) BY MOUTH DAILY WITH DINNER 90 tablet 3     No current facility-administered medications for this visit.          Health Maintenance     Health Maintenance   Topic Date Due   • PT PLAN OF CARE  10/22/2020   • COVID-19 Vaccine (4 - Pfizer series) 02/10/2022   • Influenza Vaccine (1) 09/01/2023   • Medicare Annual Wellness Visit (AWV)  09/12/2023   • Urinary Incontinence Screening  09/12/2023   • HEMOGLOBIN A1C  01/07/2024   • Depression Screening  04/13/2024   • Diabetic Foot Exam  04/13/2024   • Fall Risk  05/08/2024   • Kidney Health Evaluation: GFR  07/07/2024   • Kidney Health Evaluation: Albumin/Creatinine Ratio  07/07/2024   • BMI: Followup Plan  07/12/2024   • BMI: Adult  08/21/2024   • Breast Cancer Screening: Mammogram  01/25/2025   • DM Eye Exam  08/16/2025   • Colorectal Cancer Screening  11/02/2025   • Hepatitis C Screening  Completed   • Osteoporosis Screening  Completed   • Pneumococcal Vaccine: 65+ Years  Completed   • HIB Vaccine  Aged Out   • IPV Vaccine  Aged Out   • Hepatitis A Vaccine  Aged Out   • Meningococcal ACWY Vaccine  Aged Out   • HPV Vaccine  Aged Out     Immunization History   Administered Date(s) Administered   • COVID-19 PFIZER VACCINE 0.3 ML IM 04/10/2021, 05/01/2021, 12/16/2021   • INFLUENZA 01/05/2016, 10/18/2018, 09/18/2022   • Influenza Quadrivalent Preservative Free 3 years and older IM 10/04/2017   • Influenza Split High Dose Preservative Free IM 10/11/2019   • Influenza, high dose seasonal 0.7 mL 09/08/2020, 09/10/2021   • Influenza, seasonal, injectable 09/01/2014, 01/05/2016, 10/26/2016   • Pneumococcal Conjugate 13-Valent 12/18/2018   • Pneumococcal Polysaccharide PPV23 07/12/2016, 03/22/2021   • Tdap 07/17/2018   • Zoster 01/01/2014, 08/30/2016       Ariadna Benson Im, JOSHUA

## 2023-08-23 NOTE — RESULT ENCOUNTER NOTE
S/p URS with stone extraction and stent placement. Cystoscopy stent removal 5/5/2023. Stable imaging.   Follow-up scheduled 9/7/2023, results will be reviewed at that time

## 2023-09-07 ENCOUNTER — OFFICE VISIT (OUTPATIENT)
Dept: UROLOGY | Facility: CLINIC | Age: 68
End: 2023-09-07
Payer: COMMERCIAL

## 2023-09-07 VITALS
BODY MASS INDEX: 34.48 KG/M2 | RESPIRATION RATE: 18 BRPM | HEART RATE: 85 BPM | HEIGHT: 61 IN | OXYGEN SATURATION: 99 % | DIASTOLIC BLOOD PRESSURE: 70 MMHG | SYSTOLIC BLOOD PRESSURE: 138 MMHG | WEIGHT: 182.6 LBS

## 2023-09-07 DIAGNOSIS — N20.0 RECURRENT NEPHROLITHIASIS: ICD-10-CM

## 2023-09-07 DIAGNOSIS — N39.0 URINARY TRACT INFECTION WITHOUT HEMATURIA, SITE UNSPECIFIED: Primary | ICD-10-CM

## 2023-09-07 PROCEDURE — 99213 OFFICE O/P EST LOW 20 MIN: CPT
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
## 2023-09-12 ENCOUNTER — APPOINTMENT (OUTPATIENT)
Dept: LAB | Facility: CLINIC | Age: 68
End: 2023-09-12
Payer: COMMERCIAL

## 2023-09-12 DIAGNOSIS — N20.0 RECURRENT NEPHROLITHIASIS: ICD-10-CM

## 2023-09-12 DIAGNOSIS — N39.0 URINARY TRACT INFECTION WITHOUT HEMATURIA, SITE UNSPECIFIED: ICD-10-CM

## 2023-09-12 DIAGNOSIS — R39.9 UTI SYMPTOMS: ICD-10-CM

## 2023-09-12 LAB
ANION GAP SERPL CALCULATED.3IONS-SCNC: 8 MMOL/L
BACTERIA UR QL AUTO: ABNORMAL /HPF
BILIRUB UR QL STRIP: NEGATIVE
BUN SERPL-MCNC: 14 MG/DL (ref 5–25)
CALCIUM SERPL-MCNC: 9.5 MG/DL (ref 8.4–10.2)
CHLORIDE SERPL-SCNC: 105 MMOL/L (ref 96–108)
CLARITY UR: CLEAR
CO2 SERPL-SCNC: 30 MMOL/L (ref 21–32)
COLOR UR: YELLOW
CREAT SERPL-MCNC: 0.98 MG/DL (ref 0.6–1.3)
GFR SERPL CREATININE-BSD FRML MDRD: 59 ML/MIN/1.73SQ M
GLUCOSE P FAST SERPL-MCNC: 149 MG/DL (ref 65–99)
GLUCOSE UR STRIP-MCNC: NEGATIVE MG/DL
HGB UR QL STRIP.AUTO: NEGATIVE
HYALINE CASTS #/AREA URNS LPF: ABNORMAL /LPF
KETONES UR STRIP-MCNC: NEGATIVE MG/DL
LEUKOCYTE ESTERASE UR QL STRIP: ABNORMAL
MAGNESIUM SERPL-MCNC: 2.2 MG/DL (ref 1.9–2.7)
MUCOUS THREADS UR QL AUTO: ABNORMAL
NITRITE UR QL STRIP: NEGATIVE
NON-SQ EPI CELLS URNS QL MICRO: ABNORMAL /HPF
PH UR STRIP.AUTO: 6.5 [PH]
PHOSPHATE SERPL-MCNC: 3.9 MG/DL (ref 2.3–4.1)
POTASSIUM SERPL-SCNC: 4.5 MMOL/L (ref 3.5–5.3)
PROT UR STRIP-MCNC: ABNORMAL MG/DL
PTH-INTACT SERPL-MCNC: 46.4 PG/ML (ref 12–88)
RBC #/AREA URNS AUTO: ABNORMAL /HPF
SODIUM SERPL-SCNC: 143 MMOL/L (ref 135–147)
SP GR UR STRIP.AUTO: 1.01 (ref 1–1.03)
URATE SERPL-MCNC: 5.1 MG/DL (ref 2–7.5)
UROBILINOGEN UR STRIP-ACNC: <2 MG/DL
WBC #/AREA URNS AUTO: ABNORMAL /HPF

## 2023-09-12 PROCEDURE — 84560 ASSAY OF URINE/URIC ACID: CPT

## 2023-09-12 PROCEDURE — 82436 ASSAY OF URINE CHLORIDE: CPT

## 2023-09-12 PROCEDURE — 81003 URINALYSIS AUTO W/O SCOPE: CPT

## 2023-09-12 PROCEDURE — 82140 ASSAY OF AMMONIA: CPT

## 2023-09-12 PROCEDURE — 84550 ASSAY OF BLOOD/URIC ACID: CPT

## 2023-09-12 PROCEDURE — 83945 ASSAY OF OXALATE: CPT

## 2023-09-12 PROCEDURE — 84133 ASSAY OF URINE POTASSIUM: CPT

## 2023-09-12 PROCEDURE — 82507 ASSAY OF CITRATE: CPT

## 2023-09-12 PROCEDURE — 36415 COLL VENOUS BLD VENIPUNCTURE: CPT

## 2023-09-12 PROCEDURE — 83970 ASSAY OF PARATHORMONE: CPT

## 2023-09-12 PROCEDURE — 83735 ASSAY OF MAGNESIUM: CPT

## 2023-09-12 PROCEDURE — 84300 ASSAY OF URINE SODIUM: CPT

## 2023-09-12 PROCEDURE — 82340 ASSAY OF CALCIUM IN URINE: CPT

## 2023-09-12 PROCEDURE — 82131 AMINO ACIDS SINGLE QUANT: CPT

## 2023-09-12 PROCEDURE — 84100 ASSAY OF PHOSPHORUS: CPT

## 2023-09-12 PROCEDURE — 84392 ASSAY OF URINE SULFATE: CPT

## 2023-09-12 PROCEDURE — 87086 URINE CULTURE/COLONY COUNT: CPT

## 2023-09-12 PROCEDURE — 83935 ASSAY OF URINE OSMOLALITY: CPT

## 2023-09-12 PROCEDURE — 82570 ASSAY OF URINE CREATININE: CPT

## 2023-09-12 PROCEDURE — 84105 ASSAY OF URINE PHOSPHORUS: CPT

## 2023-09-12 PROCEDURE — 80048 BASIC METABOLIC PNL TOTAL CA: CPT

## 2023-09-13 LAB — BACTERIA UR CULT: NORMAL

## 2023-09-14 ENCOUNTER — TELEPHONE (OUTPATIENT)
Dept: UROLOGY | Facility: CLINIC | Age: 68
End: 2023-09-14

## 2023-09-14 NOTE — TELEPHONE ENCOUNTER
Patient called stating she is returning the call. Message given as per encounter note. Patient verbalized understanding no further action needed.

## 2023-09-14 NOTE — TELEPHONE ENCOUNTER
Called patient l/m      If patient calls back please advise them of message   ----- Message from Serene Moore PA-C sent at 9/13/2023  5:03 PM EDT -----  Urine testing negative.   Blood work for reason to find recurrent stone formation negative

## 2023-09-26 LAB
AMMONIA 24H UR-MRATE: 19 MEQ/24 HR
AMMONIA UR-SCNC: 8170 UG/DL
CA H2 PHOS DIHYD CRY URNS QL MICRO: 1.41 RATIO (ref 0–3)
CALCIUM 24H UR-MCNC: 8.8 MG/DL
CALCIUM 24H UR-MRATE: 352 MG/24 HR (ref 0–320)
CHLORIDE 24H UR-SCNC: 64 MMOL/L
CHLORIDE 24H UR-SRATE: 256 MMOL/24 HR (ref 38–210)
CITRATE 24H UR-MCNC: 112 MG/L
CITRATE 24H UR-MRATE: 448 MG/24 HR (ref 320–1240)
COM CRY STONE QL IR: 1.66 RATIO (ref 0–6)
CREAT 24H UR-MCNC: 26.4 MG/DL
CREAT 24H UR-MRATE: 1056 MG/24 HR (ref 800–1800)
CYSTINE 24H UR-MCNC: 4.31 MG/L
CYSTINE 24H UR-MRATE: 17.24 MG/24 HR (ref 2.1–58)
MAGNESIUM 24H UR-MRATE: 80 MG/24 HR (ref 12–293)
MAGNESIUM UR-MCNC: 2 MG/DL
NA URATE CRY STONE QL IR: 1.21 RATIO (ref 0–4)
OSMOLALITY UR: 260 MOSMOL/KG (ref 300–900)
OXALATE 24H UR-MRATE: 16 MG/24 HR (ref 4–31)
OXALATE UR-MCNC: 4 MG/L
PH 24H UR: 6.6 [PH] (ref 4.5–8)
PHOSPHATE 24H UR-MCNC: 18.8 MG/DL
PHOSPHATE 24H UR-MRATE: 752 MG/24 HR (ref 261–1078)
PLEASE NOTE (STONE RISK): ABNORMAL
POTASSIUM 24H UR-SCNC: 99.6 MMOL/24 HR (ref 14–95)
POTASSIUM UR-SCNC: 24.9 MMOL/L
PRESERVED URINE: 4000 ML/24 HR (ref 600–1600)
SODIUM 24H UR-SCNC: 63 MMOL/L
SODIUM 24H UR-SRATE: 252 MMOL/24 HR (ref 39–258)
SPECIMEN VOL 24H UR: 4000 ML/24 HR (ref 600–1600)
SULFATE 24H UR-MCNC: 12 MEQ/24 HR (ref 0–30)
SULFATE UR-MCNC: 3 MEQ/L
TRI-PHOS CRY STONE MICRO: 0.01 RATIO (ref 0–1)
URATE 24H UR-MCNC: 15.9 MG/DL
URATE 24H UR-MRATE: 636 MG/24 HR (ref 142–713)
URATE DIHYD CRY STONE QL IR: 0.19 RATIO (ref 0–1.2)

## 2023-11-09 ENCOUNTER — ANNUAL EXAM (OUTPATIENT)
Dept: GYNECOLOGY | Facility: CLINIC | Age: 68
End: 2023-11-09

## 2023-11-09 VITALS
WEIGHT: 183 LBS | BODY MASS INDEX: 34.55 KG/M2 | DIASTOLIC BLOOD PRESSURE: 70 MMHG | HEIGHT: 61 IN | SYSTOLIC BLOOD PRESSURE: 132 MMHG

## 2023-11-09 DIAGNOSIS — E13.9 DIABETES 1.5, MANAGED AS TYPE 2 (HCC): ICD-10-CM

## 2023-11-09 DIAGNOSIS — Z01.419 WOMEN'S ANNUAL ROUTINE GYNECOLOGICAL EXAMINATION: ICD-10-CM

## 2023-11-09 DIAGNOSIS — Z87.39 HISTORY OF OSTEOPENIA: ICD-10-CM

## 2023-11-09 DIAGNOSIS — G47.33 OBSTRUCTIVE SLEEP APNEA: ICD-10-CM

## 2023-11-09 DIAGNOSIS — Z12.31 ENCOUNTER FOR SCREENING MAMMOGRAM FOR MALIGNANT NEOPLASM OF BREAST: ICD-10-CM

## 2023-11-09 DIAGNOSIS — E66.9 OBESITY (BMI 30.0-34.9): ICD-10-CM

## 2023-11-09 DIAGNOSIS — I10 ESSENTIAL HYPERTENSION: ICD-10-CM
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
## 2024-01-04 DIAGNOSIS — E11.9 TYPE 2 DIABETES MELLITUS WITHOUT COMPLICATION, WITHOUT LONG-TERM CURRENT USE OF INSULIN (HCC): ICD-10-CM

## 2024-01-04 RX ORDER — DULAGLUTIDE 0.75 MG/.5ML
0.75 INJECTION, SOLUTION SUBCUTANEOUS
Qty: 6 ML | Refills: 3 | Status: SHIPPED | OUTPATIENT
Start: 2024-01-04

## 2024-01-05 DIAGNOSIS — E78.5 DYSLIPIDEMIA: ICD-10-CM

## 2024-01-05 DIAGNOSIS — M54.42 ACUTE LEFT-SIDED LOW BACK PAIN WITH LEFT-SIDED SCIATICA: ICD-10-CM

## 2024-01-05 DIAGNOSIS — E11.9 TYPE 2 DIABETES MELLITUS WITHOUT COMPLICATION, WITHOUT LONG-TERM CURRENT USE OF INSULIN (HCC): ICD-10-CM

## 2024-01-05 DIAGNOSIS — I10 BENIGN ESSENTIAL HYPERTENSION: ICD-10-CM

## 2024-01-08 ENCOUNTER — TELEPHONE (OUTPATIENT)
Dept: FAMILY MEDICINE CLINIC | Facility: CLINIC | Age: 69
End: 2024-01-08

## 2024-01-08 RX ORDER — PRAVASTATIN SODIUM 20 MG
20 TABLET ORAL
Qty: 90 TABLET | Refills: 3 | Status: SHIPPED | OUTPATIENT
Start: 2024-01-08

## 2024-01-08 RX ORDER — METHOCARBAMOL 500 MG/1
500 TABLET, FILM COATED ORAL 2 TIMES DAILY PRN
Qty: 30 TABLET | Refills: 0 | Status: SHIPPED | OUTPATIENT
Start: 2024-01-08

## 2024-01-08 RX ORDER — AMLODIPINE BESYLATE 10 MG/1
TABLET ORAL
Qty: 90 TABLET | Refills: 3 | Status: SHIPPED | OUTPATIENT
Start: 2024-01-08

## 2024-01-08 NOTE — TELEPHONE ENCOUNTER
Please contact Cristobal.     I refilled methocarbamol, muscle relaxant.   Please have her contact me if she is still having persistent back pain.

## 2024-01-10 ENCOUNTER — RA CDI HCC (OUTPATIENT)
Dept: OTHER | Facility: HOSPITAL | Age: 69
End: 2024-01-10

## 2024-01-12 ENCOUNTER — TELEPHONE (OUTPATIENT)
Dept: FAMILY MEDICINE CLINIC | Facility: CLINIC | Age: 69
End: 2024-01-12

## 2024-01-12 LAB
ALBUMIN SERPL-MCNC: 4.5 G/DL (ref 3.6–5.1)
ALBUMIN/CREAT UR: 25 MCG/MG CREAT
ALBUMIN/GLOB SERPL: 1.7 (CALC) (ref 1–2.5)
ALP SERPL-CCNC: 86 U/L (ref 37–153)
ALT SERPL-CCNC: 18 U/L (ref 6–29)
AST SERPL-CCNC: 15 U/L (ref 10–35)
BASOPHILS # BLD AUTO: 47 CELLS/UL (ref 0–200)
BASOPHILS NFR BLD AUTO: 0.8 %
BILIRUB SERPL-MCNC: 0.5 MG/DL (ref 0.2–1.2)
BUN SERPL-MCNC: 17 MG/DL (ref 7–25)
BUN/CREAT SERPL: ABNORMAL (CALC) (ref 6–22)
CALCIUM SERPL-MCNC: 9.7 MG/DL (ref 8.6–10.4)
CHLORIDE SERPL-SCNC: 101 MMOL/L (ref 98–110)
CHOLEST SERPL-MCNC: 168 MG/DL
CHOLEST/HDLC SERPL: 2.7 (CALC)
CO2 SERPL-SCNC: 30 MMOL/L (ref 20–32)
CREAT SERPL-MCNC: 0.92 MG/DL (ref 0.6–1)
CREAT UR-MCNC: 32 MG/DL (ref 20–275)
EOSINOPHIL # BLD AUTO: 148 CELLS/UL (ref 15–500)
EOSINOPHIL NFR BLD AUTO: 2.5 %
ERYTHROCYTE [DISTWIDTH] IN BLOOD BY AUTOMATED COUNT: 12.5 % (ref 11–15)
GFR/BSA.PRED SERPLBLD CYS-BASED-ARV: 67 ML/MIN/1.73M2
GLOBULIN SER CALC-MCNC: 2.6 G/DL (CALC) (ref 1.9–3.7)
GLUCOSE SERPL-MCNC: 148 MG/DL (ref 65–99)
HBA1C MFR BLD: 6.2 % OF TOTAL HGB
HCT VFR BLD AUTO: 48.2 % (ref 35–45)
HDLC SERPL-MCNC: 63 MG/DL
HGB BLD-MCNC: 15.9 G/DL (ref 11.7–15.5)
LDLC SERPL CALC-MCNC: 84 MG/DL (CALC)
LYMPHOCYTES # BLD AUTO: 1451 CELLS/UL (ref 850–3900)
LYMPHOCYTES NFR BLD AUTO: 24.6 %
MCH RBC QN AUTO: 29.8 PG (ref 27–33)
MCHC RBC AUTO-ENTMCNC: 33 G/DL (ref 32–36)
MCV RBC AUTO: 90.4 FL (ref 80–100)
MICROALBUMIN UR-MCNC: 0.8 MG/DL
MONOCYTES # BLD AUTO: 572 CELLS/UL (ref 200–950)
MONOCYTES NFR BLD AUTO: 9.7 %
NEUTROPHILS # BLD AUTO: 3682 CELLS/UL (ref 1500–7800)
NEUTROPHILS NFR BLD AUTO: 62.4 %
NONHDLC SERPL-MCNC: 105 MG/DL (CALC)
PLATELET # BLD AUTO: 327 THOUSAND/UL (ref 140–400)
PMV BLD REES-ECKER: 12.2 FL (ref 7.5–12.5)
POTASSIUM SERPL-SCNC: 4.4 MMOL/L (ref 3.5–5.3)
PROT SERPL-MCNC: 7.1 G/DL (ref 6.1–8.1)
RBC # BLD AUTO: 5.33 MILLION/UL (ref 3.8–5.1)
SODIUM SERPL-SCNC: 139 MMOL/L (ref 135–146)
TRIGL SERPL-MCNC: 116 MG/DL
TSH SERPL-ACNC: 0.78 MIU/L (ref 0.4–4.5)
WBC # BLD AUTO: 5.9 THOUSAND/UL (ref 3.8–10.8)

## 2024-01-15 ENCOUNTER — TELEPHONE (OUTPATIENT)
Dept: FAMILY MEDICINE CLINIC | Facility: CLINIC | Age: 69
End: 2024-01-15

## 2024-01-15 NOTE — TELEPHONE ENCOUNTER
----- Message from JOSHUA Pepe sent at 1/13/2024 10:37 PM EST -----  Overall stable blood work.   I see you have an appointment with Dr. Lobato this week. Please review blood work with Dr. Lobato at your office visit.

## 2024-01-17 ENCOUNTER — OFFICE VISIT (OUTPATIENT)
Dept: FAMILY MEDICINE CLINIC | Facility: CLINIC | Age: 69
End: 2024-01-17
Payer: COMMERCIAL

## 2024-01-17 VITALS
WEIGHT: 183.6 LBS | OXYGEN SATURATION: 97 % | TEMPERATURE: 97.6 F | SYSTOLIC BLOOD PRESSURE: 134 MMHG | HEART RATE: 77 BPM | RESPIRATION RATE: 16 BRPM | DIASTOLIC BLOOD PRESSURE: 80 MMHG | BODY MASS INDEX: 34.66 KG/M2 | HEIGHT: 61 IN

## 2024-01-17 DIAGNOSIS — I10 BENIGN ESSENTIAL HYPERTENSION: ICD-10-CM

## 2024-01-17 DIAGNOSIS — E11.9 TYPE 2 DIABETES MELLITUS WITHOUT COMPLICATION, WITHOUT LONG-TERM CURRENT USE OF INSULIN (HCC): ICD-10-CM

## 2024-01-17 DIAGNOSIS — E78.2 MIXED HYPERLIPIDEMIA: ICD-10-CM

## 2024-01-17 DIAGNOSIS — Z00.00 MEDICARE ANNUAL WELLNESS VISIT, SUBSEQUENT: Primary | ICD-10-CM

## 2024-01-17 DIAGNOSIS — M85.80 OSTEOPENIA, UNSPECIFIED LOCATION: ICD-10-CM

## 2024-01-17 PROCEDURE — G0439 PPPS, SUBSEQ VISIT: HCPCS | Performed by: FAMILY MEDICINE

## 2024-01-17 PROCEDURE — 99214 OFFICE O/P EST MOD 30 MIN: CPT | Performed by: FAMILY MEDICINE
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
## 2024-01-17 NOTE — PATIENT INSTRUCTIONS
Medicare Preventive Visit Patient Instructions  Thank you for completing your Welcome to Medicare Visit or Medicare Annual Wellness Visit today. Your next wellness visit will be due in one year (1/17/2025).  The screening/preventive services that you may require over the next 5-10 years are detailed below. Some tests may not apply to you based off risk factors and/or age. Screening tests ordered at today's visit but not completed yet may show as past due. Also, please note that scanned in results may not display below.  Preventive Screenings:  Service Recommendations Previous Testing/Comments   Colorectal Cancer Screening  * Colonoscopy    * Fecal Occult Blood Test (FOBT)/Fecal Immunochemical Test (FIT)  * Fecal DNA/Cologuard Test  * Flexible Sigmoidoscopy Age: 45-75 years old   Colonoscopy: every 10 years (may be performed more frequently if at higher risk)  OR  FOBT/FIT: every 1 year  OR  Cologuard: every 3 years  OR  Sigmoidoscopy: every 5 years  Screening may be recommended earlier than age 45 if at higher risk for colorectal cancer. Also, an individualized decision between you and your healthcare provider will decide whether screening between the ages of 76-85 would be appropriate. Colonoscopy: 11/02/2020  FOBT/FIT: Not on file  Cologuard: Not on file  Sigmoidoscopy: 09/15/2015          Breast Cancer Screening Age: 40+ years old  Frequency: every 1-2 years  Not required if history of left and right mastectomy Mammogram: 01/25/2023        Cervical Cancer Screening Between the ages of 21-29, pap smear recommended once every 3 years.   Between the ages of 30-65, can perform pap smear with HPV co-testing every 5 years.   Recommendations may differ for women with a history of total hysterectomy, cervical cancer, or abnormal pap smears in past. Pap Smear: 11/09/2023        Hepatitis C Screening Once for adults born between 1945 and 1965  More frequently in patients at high risk for Hepatitis C Hep C Antibody:  07/17/2017        Diabetes Screening 1-2 times per year if you're at risk for diabetes or have pre-diabetes Fasting glucose: 149 mg/dL (9/12/2023)  A1C: 6.2 % of total Hgb (1/12/2024)      Cholesterol Screening Once every 5 years if you don't have a lipid disorder. May order more often based on risk factors. Lipid panel: 01/12/2024          Other Preventive Screenings Covered by Medicare:  Abdominal Aortic Aneurysm (AAA) Screening: covered once if your at risk. You're considered to be at risk if you have a family history of AAA.  Lung Cancer Screening: covers low dose CT scan once per year if you meet all of the following conditions: (1) Age 55-77; (2) No signs or symptoms of lung cancer; (3) Current smoker or have quit smoking within the last 15 years; (4) You have a tobacco smoking history of at least 20 pack years (packs per day multiplied by number of years you smoked); (5) You get a written order from a healthcare provider.  Glaucoma Screening: covered annually if you're considered high risk: (1) You have diabetes OR (2) Family history of glaucoma OR (3)  aged 50 and older OR (4)  American aged 65 and older  Osteoporosis Screening: covered every 2 years if you meet one of the following conditions: (1) You're estrogen deficient and at risk for osteoporosis based off medical history and other findings; (2) Have a vertebral abnormality; (3) On glucocorticoid therapy for more than 3 months; (4) Have primary hyperparathyroidism; (5) On osteoporosis medications and need to assess response to drug therapy.   Last bone density test (DXA Scan): 01/18/2021.  HIV Screening: covered annually if you're between the age of 15-65. Also covered annually if you are younger than 15 and older than 65 with risk factors for HIV infection. For pregnant patients, it is covered up to 3 times per pregnancy.    Immunizations:  Immunization Recommendations   Influenza Vaccine Annual influenza vaccination during flu  season is recommended for all persons aged >= 6 months who do not have contraindications   Pneumococcal Vaccine   * Pneumococcal conjugate vaccine = PCV13 (Prevnar 13), PCV15 (Vaxneuvance), PCV20 (Prevnar 20)  * Pneumococcal polysaccharide vaccine = PPSV23 (Pneumovax) Adults 19-63 yo with certain risk factors or if 65+ yo  If never received any pneumonia vaccine: recommend Prevnar 20 (PCV20)  Give PCV20 if previously received 1 dose of PCV13 or PPSV23   Hepatitis B Vaccine 3 dose series if at intermediate or high risk (ex: diabetes, end stage renal disease, liver disease)   Respiratory syncytial virus (RSV) Vaccine - COVERED BY MEDICARE PART D  * RSVPreF3 (Arexvy) CDC recommends that adults 60 years of age and older may receive a single dose of RSV vaccine using shared clinical decision-making (SCDM)   Tetanus (Td) Vaccine - COST NOT COVERED BY MEDICARE PART B Following completion of primary series, a booster dose should be given every 10 years to maintain immunity against tetanus. Td may also be given as tetanus wound prophylaxis.   Tdap Vaccine - COST NOT COVERED BY MEDICARE PART B Recommended at least once for all adults. For pregnant patients, recommended with each pregnancy.   Shingles Vaccine (Shingrix) - COST NOT COVERED BY MEDICARE PART B  2 shot series recommended in those 19 years and older who have or will have weakened immune systems or those 50 years and older     Health Maintenance Due:      Topic Date Due   • Breast Cancer Screening: Mammogram  01/25/2025   • Colorectal Cancer Screening  11/02/2025   • Hepatitis C Screening  Completed     Immunizations Due:      Topic Date Due   • COVID-19 Vaccine (4 - 2023-24 season) 09/01/2023     Advance Directives   What are advance directives?  Advance directives are legal documents that state your wishes and plans for medical care. These plans are made ahead of time in case you lose your ability to make decisions for yourself. Advance directives can apply to any  medical decision, such as the treatments you want, and if you want to donate organs.   What are the types of advance directives?  There are many types of advance directives, and each state has rules about how to use them. You may choose a combination of any of the following:  Living will:  This is a written record of the treatment you want. You can also choose which treatments you do not want, which to limit, and which to stop at a certain time. This includes surgery, medicine, IV fluid, and tube feedings.   Durable power of  for healthcare (DPAHC):  This is a written record that states who you want to make healthcare choices for you when you are unable to make them for yourself. This person, called a proxy, is usually a family member or a friend. You may choose more than 1 proxy.  Do not resuscitate (DNR) order:  A DNR order is used in case your heart stops beating or you stop breathing. It is a request not to have certain forms of treatment, such as CPR. A DNR order may be included in other types of advance directives.  Medical directive:  This covers the care that you want if you are in a coma, near death, or unable to make decisions for yourself. You can list the treatments you want for each condition. Treatment may include pain medicine, surgery, blood transfusions, dialysis, IV or tube feedings, and a ventilator (breathing machine).  Values history:  This document has questions about your views, beliefs, and how you feel and think about life. This information can help others choose the care that you would choose.  Why are advance directives important?  An advance directive helps you control your care. Although spoken wishes may be used, it is better to have your wishes written down. Spoken wishes can be misunderstood, or not followed. Treatments may be given even if you do not want them. An advance directive may make it easier for your family to make difficult choices about your care.   Weight  Management   Why it is important to manage your weight:  Being overweight increases your risk of health conditions such as heart disease, high blood pressure, type 2 diabetes, and certain types of cancer. It can also increase your risk for osteoarthritis, sleep apnea, and other respiratory problems. Aim for a slow, steady weight loss. Even a small amount of weight loss can lower your risk of health problems.  How to lose weight safely:  A safe and healthy way to lose weight is to eat fewer calories and get regular exercise. You can lose up about 1 pound a week by decreasing the number of calories you eat by 500 calories each day.   Healthy meal plan for weight management:  A healthy meal plan includes a variety of foods, contains fewer calories, and helps you stay healthy. A healthy meal plan includes the following:  Eat whole-grain foods more often.  A healthy meal plan should contain fiber. Fiber is the part of grains, fruits, and vegetables that is not broken down by your body. Whole-grain foods are healthy and provide extra fiber in your diet. Some examples of whole-grain foods are whole-wheat breads and pastas, oatmeal, brown rice, and bulgur.  Eat a variety of vegetables every day.  Include dark, leafy greens such as spinach, kale, risa greens, and mustard greens. Eat yellow and orange vegetables such as carrots, sweet potatoes, and winter squash.   Eat a variety of fruits every day.  Choose fresh or canned fruit (canned in its own juice or light syrup) instead of juice. Fruit juice has very little or no fiber.  Eat low-fat dairy foods.  Drink fat-free (skim) milk or 1% milk. Eat fat-free yogurt and low-fat cottage cheese. Try low-fat cheeses such as mozzarella and other reduced-fat cheeses.  Choose meat and other protein foods that are low in fat.  Choose beans or other legumes such as split peas or lentils. Choose fish, skinless poultry (chicken or turkey), or lean cuts of red meat (beef or pork). Before  you cook meat or poultry, cut off any visible fat.   Use less fat and oil.  Try baking foods instead of frying them. Add less fat, such as margarine, sour cream, regular salad dressing and mayonnaise to foods. Eat fewer high-fat foods. Some examples of high-fat foods include french fries, doughnuts, ice cream, and cakes.  Eat fewer sweets.  Limit foods and drinks that are high in sugar. This includes candy, cookies, regular soda, and sweetened drinks.  Exercise:  Exercise at least 30 minutes per day on most days of the week. Some examples of exercise include walking, biking, dancing, and swimming. You can also fit in more physical activity by taking the stairs instead of the elevator or parking farther away from stores. Ask your healthcare provider about the best exercise plan for you.      © Copyright [employer] 2018 Information is for End User's use only and may not be sold, redistributed or otherwise used for commercial purposes. All illustrations and images included in CareNotes® are the copyrighted property of [employer] Inc. or [employer]

## 2024-01-17 NOTE — ASSESSMENT & PLAN NOTE
Lab Results   Component Value Date    HGBA1C 6.2 (H) 01/12/2024   Compliant with medications, at goal.   Up to date on foot and eye exam.

## 2024-01-22 ENCOUNTER — TELEPHONE (OUTPATIENT)
Dept: FAMILY MEDICINE CLINIC | Facility: CLINIC | Age: 69
End: 2024-01-22

## 2024-01-23 NOTE — TELEPHONE ENCOUNTER
Please contact Cristobal.     I refilled methocarbamol on 1/8, and received another refill request.     Please ask if she really needs a refill and if she is still having back pain.       Thank you.

## 2024-02-08 ENCOUNTER — HOSPITAL ENCOUNTER (OUTPATIENT)
Dept: MAMMOGRAPHY | Facility: IMAGING CENTER | Age: 69
Discharge: HOME | End: 2024-02-08
Payer: COMMERCIAL

## 2024-02-08 ENCOUNTER — HOSPITAL ENCOUNTER (OUTPATIENT)
Dept: BONE DENSITY | Facility: IMAGING CENTER | Age: 69
Discharge: HOME | End: 2024-02-08
Payer: COMMERCIAL

## 2024-02-08 VITALS — HEIGHT: 61 IN | BODY MASS INDEX: 34.51 KG/M2 | WEIGHT: 182.8 LBS

## 2024-02-08 DIAGNOSIS — Z78.0 POST-MENOPAUSAL: ICD-10-CM

## 2024-02-08 DIAGNOSIS — Z12.31 ENCOUNTER FOR SCREENING MAMMOGRAM FOR MALIGNANT NEOPLASM OF BREAST: ICD-10-CM

## 2024-02-08 PROCEDURE — 77063 BREAST TOMOSYNTHESIS BI: CPT

## 2024-02-08 PROCEDURE — 77080 DXA BONE DENSITY AXIAL: CPT

## 2024-02-08 PROCEDURE — 77067 SCR MAMMO BI INCL CAD: CPT

## 2024-02-12 ENCOUNTER — TELEPHONE (OUTPATIENT)
Dept: FAMILY MEDICINE CLINIC | Facility: CLINIC | Age: 69
End: 2024-02-12

## 2024-02-12 NOTE — TELEPHONE ENCOUNTER
----- Message from JOSHUA Pepe sent at 2/11/2024 10:22 PM EST -----  Please let Cristobal know her dexa scan shows osteopenia, low bone density, but not low enough to be osteoporosis.   Continue vitamin D supplement, dietary calcium, and regular weight bearing activities.

## 2024-03-29 ENCOUNTER — TELEPHONE (OUTPATIENT)
Age: 69
End: 2024-03-29

## 2024-03-29 ENCOUNTER — TELEPHONE (OUTPATIENT)
Dept: GYNECOLOGY | Facility: CLINIC | Age: 69
End: 2024-03-29

## 2024-03-29 NOTE — TELEPHONE ENCOUNTER
Called pt to schedule her for an appt for problem with WR on 4/1/24 at 11:00 am pt okay with [place] location.

## 2024-03-29 NOTE — TELEPHONE ENCOUNTER
Patient has vaginal itching with irritation. Has it for 2 months. Has used vagisil with no relief. Also has had swelling on r side of groin and vaginal area. Please advise. She is also using pads which she feels might be causing the problem.

## 2024-03-31 DIAGNOSIS — E11.9 TYPE 2 DIABETES MELLITUS WITHOUT COMPLICATION, WITHOUT LONG-TERM CURRENT USE OF INSULIN (HCC): ICD-10-CM

## 2024-04-01 ENCOUNTER — OFFICE VISIT (OUTPATIENT)
Dept: GYNECOLOGY | Facility: CLINIC | Age: 69
End: 2024-04-01
Payer: COMMERCIAL

## 2024-04-01 VITALS — SYSTOLIC BLOOD PRESSURE: 146 MMHG | DIASTOLIC BLOOD PRESSURE: 90 MMHG | WEIGHT: 186.4 LBS | BODY MASS INDEX: 35.22 KG/M2

## 2024-04-01 DIAGNOSIS — Z90.710 STATUS POST ABDOMINAL HYSTERECTOMY: ICD-10-CM

## 2024-04-01 DIAGNOSIS — N89.8 VAGINAL ODOR: ICD-10-CM

## 2024-04-01 DIAGNOSIS — B96.89 BV (BACTERIAL VAGINOSIS): Primary | ICD-10-CM

## 2024-04-01 DIAGNOSIS — B37.31 VAGINAL YEAST INFECTION: ICD-10-CM

## 2024-04-01 DIAGNOSIS — N39.3 SUI (STRESS URINARY INCONTINENCE, FEMALE): ICD-10-CM

## 2024-04-01 DIAGNOSIS — N89.8 VAGINAL DISCHARGE: ICD-10-CM

## 2024-04-01 DIAGNOSIS — N76.0 BV (BACTERIAL VAGINOSIS): Primary | ICD-10-CM

## 2024-04-01 DIAGNOSIS — D58.2 ELEVATED HEMOGLOBIN (HCC): ICD-10-CM

## 2024-04-01 DIAGNOSIS — I10 BENIGN ESSENTIAL HYPERTENSION: ICD-10-CM

## 2024-04-01 DIAGNOSIS — N95.1 MENOPAUSAL STATE: ICD-10-CM

## 2024-04-01 DIAGNOSIS — E66.9 OBESITY (BMI 35.0-39.9 WITHOUT COMORBIDITY): ICD-10-CM

## 2024-04-01 PROCEDURE — 99214 OFFICE O/P EST MOD 30 MIN: CPT | Performed by: OBSTETRICS & GYNECOLOGY

## 2024-04-01 RX ORDER — REPAGLINIDE 0.5 MG/1
0.5 TABLET ORAL
Qty: 90 TABLET | Refills: 3 | Status: SHIPPED | OUTPATIENT
Start: 2024-04-01

## 2024-04-01 RX ORDER — CLINDAMYCIN HYDROCHLORIDE 300 MG/1
300 CAPSULE ORAL 2 TIMES DAILY
Qty: 14 CAPSULE | Refills: 0 | Status: SHIPPED | OUTPATIENT
Start: 2024-04-01 | End: 2024-04-08

## 2024-04-01 RX ORDER — NYSTATIN AND TRIAMCINOLONE ACETONIDE 100000; 1 [USP'U]/G; MG/G
OINTMENT TOPICAL 2 TIMES DAILY
Qty: 30 G | Refills: 1 | Status: SHIPPED | OUTPATIENT
Start: 2024-04-01

## 2024-04-01 NOTE — PROGRESS NOTES
Assessment/Plan:    Diagnoses and all orders for this visit:    BV (bacterial vaginosis)  -     clindamycin (CLEOCIN) 300 MG capsule; Take 1 capsule (300 mg total) by mouth 2 (two) times a day for 7 days    Vaginal yeast infection  -     nystatin-triamcinolone (MYCOLOG-II) ointment; Apply topically 2 (two) times a day    Vaginal discharge  -     clindamycin (CLEOCIN) 300 MG capsule; Take 1 capsule (300 mg total) by mouth 2 (two) times a day for 7 days    Vaginal odor  -     clindamycin (CLEOCIN) 300 MG capsule; Take 1 capsule (300 mg total) by mouth 2 (two) times a day for 7 days    MINESH (stress urinary incontinence, female)    Obesity (BMI 35.0-39.9 without comorbidity)    Menopausal state    Benign essential hypertension    Status post abdominal hysterectomy    Elevated hemoglobin (HCC)        Subjective: Vaginal discharge and odor     Patient ID: Lori Rodrigues is a 68 y.o. female.    HPI  68-year-old female, history of obesity status post abdominal hysterectomy many years ago.  Patient here today because of vaginal discharge and odor and irritation.  Wet mount exam reveals trace of clue cells consistent with bacterial vaginosis.  External area of erythematous changes and mild scarring from scratching.  She is no longer sexually active.  Denies any pelvic pain symptoms since her hysterectomy.  Will treat with Mycolog ointment and clindamycin pills for BV follow-up 3 months and as needed    Review of Systems .  No new changes    Objective: No acute distress  /90 (BP Location: Left arm, Patient Position: Sitting, Cuff Size: Large)   Wt 84.6 kg (186 lb 6.4 oz)   BMI 35.22 kg/m²      Physical Exam  Vitals and nursing note reviewed. Exam conducted with a chaperone present.   Constitutional:       Appearance: Normal appearance. She is obese.   HENT:      Head: Normocephalic.   Eyes:      Pupils: Pupils are equal, round, and reactive to light.   Pulmonary:      Effort: Pulmonary effort is normal.   Abdominal:  Baird        General: Abdomen is flat.      Palpations: Abdomen is soft.   Genitourinary:         Comments: Uterus and cervix surgically absent  Erythematous changes and scaling noted and demarcated area  Yellow vaginal discharge  Wet mount positive for clue cells consistent with BV.  Musculoskeletal:      Cervical back: Normal range of motion.   Neurological:      Mental Status: She is alert.        Please note    In addition to the time spent discussing the findings and results of today's visit and exam, I spent approximately 30 minutes of face-to-face time with the patient, greater than 50% of which was spent in counseling and coordination of care for this patient.

## 2024-04-13 DIAGNOSIS — M54.42 ACUTE LEFT-SIDED LOW BACK PAIN WITH LEFT-SIDED SCIATICA: ICD-10-CM

## 2024-04-15 RX ORDER — METHOCARBAMOL 500 MG/1
500 TABLET, FILM COATED ORAL 2 TIMES DAILY PRN
Qty: 30 TABLET | Refills: 0 | Status: SHIPPED | OUTPATIENT
Start: 2024-04-15

## 2024-04-16 ENCOUNTER — OFFICE VISIT (OUTPATIENT)
Dept: CARDIOLOGY CLINIC | Facility: CLINIC | Age: 69
End: 2024-04-16
Payer: COMMERCIAL

## 2024-04-16 VITALS
SYSTOLIC BLOOD PRESSURE: 142 MMHG | OXYGEN SATURATION: 97 % | HEIGHT: 61 IN | DIASTOLIC BLOOD PRESSURE: 74 MMHG | HEART RATE: 81 BPM | BODY MASS INDEX: 34.72 KG/M2 | WEIGHT: 183.9 LBS

## 2024-04-16 DIAGNOSIS — G47.33 OBSTRUCTIVE SLEEP APNEA: ICD-10-CM

## 2024-04-16 DIAGNOSIS — E78.5 HYPERLIPIDEMIA, UNSPECIFIED HYPERLIPIDEMIA TYPE: ICD-10-CM

## 2024-04-16 DIAGNOSIS — E11.9 TYPE 2 DIABETES MELLITUS WITHOUT COMPLICATION, WITHOUT LONG-TERM CURRENT USE OF INSULIN (HCC): ICD-10-CM

## 2024-04-16 DIAGNOSIS — R94.31 NONSPECIFIC ABNORMAL ELECTROCARDIOGRAM (ECG) (EKG): ICD-10-CM

## 2024-04-16 DIAGNOSIS — I10 BENIGN ESSENTIAL HYPERTENSION: Primary | ICD-10-CM

## 2024-04-16 PROCEDURE — 99214 OFFICE O/P EST MOD 30 MIN: CPT | Performed by: INTERNAL MEDICINE

## 2024-04-16 PROCEDURE — 93000 ELECTROCARDIOGRAM COMPLETE: CPT | Performed by: INTERNAL MEDICINE
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
## 2024-04-25 ENCOUNTER — HOSPITAL ENCOUNTER (OUTPATIENT)
Dept: ULTRASOUND IMAGING | Facility: HOSPITAL | Age: 69
Discharge: HOME | End: 2024-04-25
Payer: COMMERCIAL

## 2024-04-25 ENCOUNTER — RA CDI HCC (OUTPATIENT)
Dept: OTHER | Facility: HOSPITAL | Age: 69
End: 2024-04-25

## 2024-04-25 DIAGNOSIS — E04.2 MULTIPLE THYROID NODULES: ICD-10-CM

## 2024-04-25 PROCEDURE — 76536 US EXAM OF HEAD AND NECK: CPT

## 2024-04-29 ENCOUNTER — OFFICE VISIT (OUTPATIENT)
Dept: FAMILY MEDICINE CLINIC | Facility: CLINIC | Age: 69
End: 2024-04-29
Payer: COMMERCIAL

## 2024-04-29 VITALS
BODY MASS INDEX: 34.63 KG/M2 | TEMPERATURE: 97.5 F | HEART RATE: 78 BPM | WEIGHT: 183.4 LBS | OXYGEN SATURATION: 97 % | DIASTOLIC BLOOD PRESSURE: 86 MMHG | SYSTOLIC BLOOD PRESSURE: 136 MMHG | HEIGHT: 61 IN | RESPIRATION RATE: 16 BRPM

## 2024-04-29 DIAGNOSIS — E11.9 TYPE 2 DIABETES MELLITUS WITHOUT COMPLICATION, WITHOUT LONG-TERM CURRENT USE OF INSULIN (HCC): ICD-10-CM

## 2024-04-29 DIAGNOSIS — E78.2 MIXED HYPERLIPIDEMIA: ICD-10-CM

## 2024-04-29 DIAGNOSIS — N39.3 STRESS INCONTINENCE: ICD-10-CM

## 2024-04-29 DIAGNOSIS — I10 BENIGN ESSENTIAL HYPERTENSION: ICD-10-CM

## 2024-04-29 DIAGNOSIS — S69.90XA THUMB INJURY, INITIAL ENCOUNTER: Primary | ICD-10-CM

## 2024-04-29 PROCEDURE — 99213 OFFICE O/P EST LOW 20 MIN: CPT | Performed by: NURSE PRACTITIONER

## 2024-04-29 PROCEDURE — G2211 COMPLEX E/M VISIT ADD ON: HCPCS | Performed by: NURSE PRACTITIONER

## 2024-04-29 RX ORDER — CEPHALEXIN 500 MG/1
500 CAPSULE ORAL EVERY 8 HOURS SCHEDULED
Qty: 21 CAPSULE | Refills: 0 | Status: SHIPPED | OUTPATIENT
Start: 2024-04-29 | End: 2024-05-06

## 2024-04-29 NOTE — PROGRESS NOTES
FAMILY PRACTICE OFFICE VISIT       NAME: Lori Rodrigues  AGE: 68 y.o. SEX: female       : 1955        MRN: [id number]    Assessment and Plan   1. Thumb injury, initial encounter  Will check x-ray of right thumb, and start keflex. She will call me if wound does not continue to heal as expected.   -     XR thumb right first digit-min 2v; Future; Expected date: 2024  -     cephalexin (KEFLEX) 500 mg capsule; Take 1 capsule (500 mg total) by mouth every 8 (eight) hours for 7 days    2. Benign essential hypertension  -     CBC; Future  -     Comprehensive metabolic panel; Future  -     TSH, 3rd generation; Future    3. Type 2 diabetes mellitus without complication, without long-term current use of insulin (HCC)  -     Hemoglobin A1C; Future    4. Mixed hyperlipidemia  -     Lipid panel; Future    5. Stress incontinence       Due for regular check up in July, blood work prior to appointment.       Chief Complaint     Chief Complaint   Patient presents with    swollen thumb     Hit on glass door last week right thumb   Socks and shoes removed        History of Present Illness     Lori Rodrigues is a 68 year old female presenting today for thumb injury.     Accidentally slammed her right thumb in the glass door about one week ago.   It is looking much better, but wanted to have it checked.   Soaking in hot water, using antibiotic ointment.   Noted a slit in her nail this week. Concerned she may have foreign body in her finger.   Oozes clear to pus like drainage at times.             Review of Systems   Review of Systems   Constitutional: Negative.    Skin:         Right thumb wound.        I have reviewed the patient's medical history in detail; there are no changes to the history as noted in the electronic medical record.    Objective     Vitals:    24 1531   BP: 136/86   BP Location: Left arm   Patient Position: Sitting   Cuff Size: Large   Pulse: 78   Resp: 16   Temp: 97.5 °F (36.4 °C)  "  TempSrc: Temporal   SpO2: 97%   Weight: 83.2 kg (183 lb 6.4 oz)   Height: 5' 1\" (1.549 m)     Wt Readings from Last 3 Encounters:   04/29/24 83.2 kg (183 lb 6.4 oz)   04/16/24 83.4 kg (183 lb 14.4 oz)   04/01/24 84.6 kg (186 lb 6.4 oz)     Physical Exam  Vitals and nursing note reviewed.   Constitutional:       Appearance: Normal appearance.   Cardiovascular:      Pulses: no weak pulses.           Dorsalis pedis pulses are 2+ on the right side and 2+ on the left side.        Posterior tibial pulses are 2+ on the right side and 2+ on the left side.   Feet:      Right foot:      Skin integrity: Callus (great toe) present. No ulcer, skin breakdown, erythema, warmth or dry skin.      Left foot:      Skin integrity: Callus (great toe) present. No ulcer, skin breakdown, erythema, warmth or dry skin.   Skin:     Comments: Right thumb lower lateral nail bed with open skin approximate 4-5 mm.   Nail bed ecchymosis.   Nail has small slit in it.   Swelling at nail base, no other swelling of finger.    Neurological:      Mental Status: She is alert.     Diabetic Foot Exam    Patient's shoes and socks removed.    Right Foot/Ankle   Right Foot Inspection  Skin Exam: skin normal, skin intact, callus (great toe) and callus (great toe). No dry skin, no warmth, no erythema, no maceration, no abnormal color, no pre-ulcer and no ulcer.     Toe Exam: ROM and strength within normal limits.     Sensory   Proprioception: intact  Monofilament testing: intact    Vascular  Capillary refills: < 3 seconds  The right DP pulse is 2+. The right PT pulse is 2+.     Left Foot/Ankle  Left Foot Inspection  Skin Exam: skin normal, skin intact and callus (great toe). No dry skin, no warmth, no erythema, no maceration, normal color, no pre-ulcer and no ulcer.     Toe Exam: ROM and strength within normal limits.     Sensory   Proprioception: intact  Monofilament testing: intact    Vascular  Capillary refills: < 3 seconds  The left DP pulse is 2+. The " left PT pulse is 2+.     Assign Risk Category  No deformity present  No loss of protective sensation  No weak pulses  Risk: 0      Urinary Incontinence Plan of Care: counseling topics discussed: use restroom every 2 hours.         ALLERGIES:  Allergies   Allergen Reactions    Atorvastatin     Sulfa Antibiotics Myalgia    Sulfamethopyrazine      Joint stiffness    Sulfamethoxazole-Trimethoprim Other (See Comments)     Joint pain       Current Medications     Current Outpatient Medications   Medication Sig Dispense Refill    acetaminophen (TYLENOL) 500 mg tablet Take 500 mg by mouth every 6 (six) hours as needed for mild pain      amLODIPine (NORVASC) 10 mg tablet TAKE 1 TABLET BY MOUTH EVERY DAY WITH DINNER 90 tablet 3    Blood Glucose Monitoring Suppl (OneTouch Verio Flex System) w/Device KIT USE DAILY AND WHEN HAVING SYMPTOMS AS DIRECTED TO CHECK BLOOD SUGARS 1 kit 0    cephalexin (KEFLEX) 500 mg capsule Take 1 capsule (500 mg total) by mouth every 8 (eight) hours for 7 days 21 capsule 0    Cholecalciferol (VITAMIN D3) 1000 units CAPS Take by mouth      dulaglutide (Trulicity) 0.75 MG/0.5ML injection INJECT 0.5 ML (0.75 MG TOTAL) UNDER THE SKIN EVERY 7 DAYS 6 mL 3    famotidine (PEPCID) 10 mg tablet Take 10 mg by mouth daily as needed       Flaxseed Oil OIL by Does not apply route      fluticasone (FLONASE) 50 mcg/act nasal spray 2 sprays into each nostril daily as needed       ibuprofen (MOTRIN) 200 mg tablet Take by mouth every 6 (six) hours as needed for mild pain      Lactobacillus (PROBIOTIC ACIDOPHILUS) CAPS Take 1 tablet daily as supplement.      Lancet Devices (ONE TOUCH DELICA LANCING DEV) MISC Use daily and when having symptoms as directed to check blood sugars 100 each PRN    Lancets (OneTouch Delica Plus [id number]) MISC Use daily and when having symptoms as directed to check blood sugars 100 each 3    lisinopril (ZESTRIL) 40 mg tablet TAKE 1 TABLET (40 MG TOTAL) BY MOUTH DAILY IN THE EARLY MORNING 90  tablet 3    loratadine (CLARITIN) 10 mg tablet Take 1 tablet by mouth daily as needed      methocarbamol (ROBAXIN) 500 mg tablet TAKE 1 TABLET (500 MG TOTAL) BY MOUTH 2 (TWO) TIMES A DAY AS NEEDED FOR MUSCLE SPASMS 30 tablet 0    nystatin-triamcinolone (MYCOLOG-II) ointment Apply topically 2 (two) times a day 30 g 1    OneTouch Verio test strip USE DAILY AND WHEN HAVING SYMPTOMS AS DIRECTED TO CHECK BLOOD SUGARS 100 strip PRN    pravastatin (PRAVACHOL) 20 mg tablet TAKE 1 TABLET BY MOUTH EVERYDAY AT BEDTIME 90 tablet 3    repaglinide (PRANDIN) 0.5 mg tablet TAKE 1 TABLET (0.5 MG TOTAL) BY MOUTH DAILY WITH DINNER 90 tablet 3    carvedilol (COREG) 6.25 mg tablet TAKE 1 TABLET BY MOUTH TWICE A DAY WITH MEALS 180 tablet 1     No current facility-administered medications for this visit.         Health Maintenance     Health Maintenance   Topic Date Due    Zoster Vaccine (2 of 3) 10/25/2016    PT PLAN OF CARE  10/22/2020    COVID-19 Vaccine (4 - 2023-24 season) 09/01/2023    Diabetic Foot Exam  04/13/2024    HEMOGLOBIN A1C  07/12/2024    Kidney Health Evaluation: GFR  01/12/2025    Kidney Health Evaluation: Albumin/Creatinine Ratio  01/12/2025    Fall Risk  01/17/2025    Depression Screening  01/17/2025    Urinary Incontinence Screening  01/17/2025    Medicare Annual Wellness Visit (AWV)  01/17/2025    DM Eye Exam  08/16/2025    Colorectal Cancer Screening  11/02/2025    Breast Cancer Screening: Mammogram  02/08/2026    Hepatitis C Screening  Completed    Osteoporosis Screening  Completed    Pneumococcal Vaccine: 65+ Years  Completed    Influenza Vaccine  Completed    HIB Vaccine  Aged Out    IPV Vaccine  Aged Out    Hepatitis A Vaccine  Aged Out    Meningococcal ACWY Vaccine  Aged Out    HPV Vaccine  Aged Out     Immunization History   Administered Date(s) Administered    COVID-19 PFIZER VACCINE 0.3 ML IM 04/10/2021, 05/01/2021, 12/16/2021    INFLUENZA 01/05/2016, 10/18/2018, 09/18/2022, 11/29/2023    Influenza  Quadrivalent Preservative Free 3 years and older IM 10/04/2017    Influenza Split High Dose Preservative Free IM 10/11/2019    Influenza, high dose seasonal 0.7 mL 09/08/2020, 09/10/2021    Influenza, seasonal, injectable 09/01/2014, 01/05/2016, 10/26/2016    Pneumococcal Conjugate 13-Valent 12/18/2018    Pneumococcal Polysaccharide PPV23 07/12/2016, 03/22/2021    Tdap 07/17/2018    Zoster 01/01/2014, 08/30/2016       CRYSTAL PepeNP

## 2024-04-30 DIAGNOSIS — I10 BENIGN ESSENTIAL HYPERTENSION: ICD-10-CM

## 2024-04-30 DIAGNOSIS — E78.2 MIXED HYPERLIPIDEMIA: ICD-10-CM

## 2024-05-01 ENCOUNTER — HOSPITAL ENCOUNTER (OUTPATIENT)
Dept: RADIOLOGY | Facility: HOSPITAL | Age: 69
Discharge: HOME | End: 2024-05-01
Payer: COMMERCIAL

## 2024-05-01 DIAGNOSIS — S69.90XA THUMB INJURY, INITIAL ENCOUNTER: ICD-10-CM

## 2024-05-01 PROCEDURE — 73140 X-RAY EXAM OF FINGER(S): CPT

## 2024-05-01 RX ORDER — CARVEDILOL 6.25 MG/1
6.25 TABLET ORAL 2 TIMES DAILY WITH MEALS
Qty: 180 TABLET | Refills: 1 | Status: SHIPPED | OUTPATIENT
Start: 2024-05-01

## 2024-05-10 ENCOUNTER — TELEPHONE (OUTPATIENT)
Dept: ADMINISTRATIVE | Facility: OTHER | Age: 69
End: 2024-05-10

## 2024-05-10 NOTE — TELEPHONE ENCOUNTER
----- Message from Mariela Quevedo sent at 5/9/2024  2:50 PM EDT -----  Regarding: Care Gap Request  05/09/24 2:50 PM    Hello, our patient attached above has had Diabetic Foot Exam completed/performed. Please assist in updating the patient chart by pulling the document from Encounter Tab within Chart Review. The date of service is 4/18/2024.     Thank you,  Mariela CASTILLO

## 2024-05-14 NOTE — TELEPHONE ENCOUNTER
Upon review of the In Basket request we have found that the patient has not yet had the requested item completed or has not established care. Due to protocols, we are unable to hold requests for resulting/linking of a future items and are unable to proceed. Patients who have not established care within the Network do not have consents on file, record requests, etc.    Any additional questions or concerns should be emailed to the Practice Liaisons via the appropriate education email address, please do not reply via In Basket.    Thank you  Ines Weber MA

## 2024-05-27 DIAGNOSIS — B37.31 VAGINAL YEAST INFECTION: ICD-10-CM

## 2024-05-28 RX ORDER — NYSTATIN AND TRIAMCINOLONE ACETONIDE 100000; 1 [USP'U]/G; MG/G
1 OINTMENT TOPICAL 2 TIMES DAILY
Qty: 30 G | Refills: 1 | Status: SHIPPED | OUTPATIENT
Start: 2024-05-28

## 2024-07-09 ENCOUNTER — RA CDI HCC (OUTPATIENT)
Dept: OTHER | Facility: HOSPITAL | Age: 69
End: 2024-07-09

## 2024-07-11 ENCOUNTER — OFFICE VISIT (OUTPATIENT)
Dept: GYNECOLOGY | Facility: CLINIC | Age: 69
End: 2024-07-11
Payer: COMMERCIAL

## 2024-07-11 VITALS
SYSTOLIC BLOOD PRESSURE: 140 MMHG | WEIGHT: 182.2 LBS | HEIGHT: 61 IN | DIASTOLIC BLOOD PRESSURE: 84 MMHG | BODY MASS INDEX: 34.4 KG/M2

## 2024-07-11 DIAGNOSIS — Z90.710 STATUS POST TAH-BSO: Primary | ICD-10-CM

## 2024-07-11 DIAGNOSIS — N39.3 SUI (STRESS URINARY INCONTINENCE, FEMALE): ICD-10-CM

## 2024-07-11 DIAGNOSIS — Z78.0 ASYMPTOMATIC MENOPAUSAL STATE: ICD-10-CM

## 2024-07-11 DIAGNOSIS — Z90.722 STATUS POST TAH-BSO: Primary | ICD-10-CM

## 2024-07-11 DIAGNOSIS — Z90.79 STATUS POST TAH-BSO: Primary | ICD-10-CM

## 2024-07-11 DIAGNOSIS — E66.9 OBESITY (BMI 30.0-34.9): ICD-10-CM

## 2024-07-11 PROCEDURE — 99213 OFFICE O/P EST LOW 20 MIN: CPT | Performed by: OBSTETRICS & GYNECOLOGY

## 2024-07-11 NOTE — PROGRESS NOTES
"Assessment/Plan:    Diagnoses and all orders for this visit:    Status post AVERY-BSO    Obesity (BMI 30.0-34.9)    MINESH (stress urinary incontinence, female)    Asymptomatic menopausal state        Subjective: Here for 3-month follow-up     Patient ID: Lori Rodrigues is a 68 y.o. female.    HPI  68-year-old female, status post AVERY/BSO many years ago.  Asymptomatic menopausal state was recently seen 3 months ago with complaints of vaginal discharge with irritation.  She states that her symptoms improved with treatment for bacterial vaginosis.  She is not having any other new GYN concerns or complaints today and declines exam.  She did complete her mammogram earlier this year in February.  Because of her insurance she will be eligible for annual exam until 2025.  Patient will call if she has any new complaints or concerns follow-up at that time.  Review of Systems unchanged  Objective: No acute distress  /84 (BP Location: Right arm, Patient Position: Sitting, Cuff Size: Standard)   Ht 5' 1\" (1.549 m)   Wt 82.6 kg (182 lb 3.2 oz)   BMI 34.43 kg/m²    Physical Exam  Vitals and nursing note reviewed.   Constitutional:       Appearance: Normal appearance. She is obese.   HENT:      Head: Normocephalic.   Eyes:      Pupils: Pupils are equal, round, and reactive to light.   Pulmonary:      Effort: Pulmonary effort is normal.   Genitourinary:     Comments: Pelvic exam deferred  Neurological:      Mental Status: She is alert and oriented to person, place, and time.   Psychiatric:         Mood and Affect: Mood normal.         Behavior: Behavior normal.         Thought Content: Thought content normal.         Judgment: Judgment normal.        Please note    In addition to the time spent discussing the findings and results of today's visit and exam, I spent approximately 20 minutes of face-to-face time with the patient, greater than 50% of which was spent in counseling and coordination of care for this patient.  "

## 2024-07-13 LAB
ALBUMIN SERPL-MCNC: 4.4 G/DL (ref 3.6–5.1)
ALBUMIN/GLOB SERPL: 1.7 (CALC) (ref 1–2.5)
ALP SERPL-CCNC: 79 U/L (ref 37–153)
ALT SERPL-CCNC: 17 U/L (ref 6–29)
AST SERPL-CCNC: 15 U/L (ref 10–35)
BASOPHILS # BLD AUTO: 72 CELLS/UL (ref 0–200)
BASOPHILS NFR BLD AUTO: 1.2 %
BILIRUB SERPL-MCNC: 0.7 MG/DL (ref 0.2–1.2)
BUN SERPL-MCNC: 16 MG/DL (ref 7–25)
BUN/CREAT SERPL: ABNORMAL (CALC) (ref 6–22)
CALCIUM SERPL-MCNC: 9.7 MG/DL (ref 8.6–10.4)
CHLORIDE SERPL-SCNC: 105 MMOL/L (ref 98–110)
CHOLEST SERPL-MCNC: 154 MG/DL
CHOLEST/HDLC SERPL: 2.3 (CALC)
CO2 SERPL-SCNC: 30 MMOL/L (ref 20–32)
CREAT SERPL-MCNC: 0.84 MG/DL (ref 0.6–1)
EOSINOPHIL # BLD AUTO: 192 CELLS/UL (ref 15–500)
EOSINOPHIL NFR BLD AUTO: 3.2 %
ERYTHROCYTE [DISTWIDTH] IN BLOOD BY AUTOMATED COUNT: 12.6 % (ref 11–15)
GFR/BSA.PRED SERPLBLD CYS-BASED-ARV: 75 ML/MIN/1.73M2
GLOBULIN SER CALC-MCNC: 2.6 G/DL (CALC) (ref 1.9–3.7)
GLUCOSE SERPL-MCNC: 133 MG/DL (ref 65–99)
HBA1C MFR BLD: 6.5 % OF TOTAL HGB
HCT VFR BLD AUTO: 48 % (ref 35–45)
HDLC SERPL-MCNC: 66 MG/DL
HGB BLD-MCNC: 16.2 G/DL (ref 11.7–15.5)
LDLC SERPL CALC-MCNC: 69 MG/DL (CALC)
LYMPHOCYTES # BLD AUTO: 1482 CELLS/UL (ref 850–3900)
LYMPHOCYTES NFR BLD AUTO: 24.7 %
MCH RBC QN AUTO: 30.2 PG (ref 27–33)
MCHC RBC AUTO-ENTMCNC: 33.8 G/DL (ref 32–36)
MCV RBC AUTO: 89.6 FL (ref 80–100)
MONOCYTES # BLD AUTO: 570 CELLS/UL (ref 200–950)
MONOCYTES NFR BLD AUTO: 9.5 %
NEUTROPHILS # BLD AUTO: 3684 CELLS/UL (ref 1500–7800)
NEUTROPHILS NFR BLD AUTO: 61.4 %
NONHDLC SERPL-MCNC: 88 MG/DL (CALC)
PLATELET # BLD AUTO: 255 THOUSAND/UL (ref 140–400)
PMV BLD REES-ECKER: 12.9 FL (ref 7.5–12.5)
POTASSIUM SERPL-SCNC: 4.3 MMOL/L (ref 3.5–5.3)
PROT SERPL-MCNC: 7 G/DL (ref 6.1–8.1)
RBC # BLD AUTO: 5.36 MILLION/UL (ref 3.8–5.1)
SODIUM SERPL-SCNC: 143 MMOL/L (ref 135–146)
TRIGL SERPL-MCNC: 100 MG/DL
TSH SERPL-ACNC: 0.91 MIU/L (ref 0.4–4.5)
WBC # BLD AUTO: 6 THOUSAND/UL (ref 3.8–10.8)

## 2024-07-17 ENCOUNTER — OFFICE VISIT (OUTPATIENT)
Dept: FAMILY MEDICINE CLINIC | Facility: CLINIC | Age: 69
End: 2024-07-17
Payer: COMMERCIAL

## 2024-07-17 VITALS
BODY MASS INDEX: 34.25 KG/M2 | SYSTOLIC BLOOD PRESSURE: 136 MMHG | WEIGHT: 181.4 LBS | HEART RATE: 73 BPM | RESPIRATION RATE: 16 BRPM | TEMPERATURE: 97.5 F | OXYGEN SATURATION: 96 % | DIASTOLIC BLOOD PRESSURE: 82 MMHG | HEIGHT: 61 IN

## 2024-07-17 DIAGNOSIS — E11.9 TYPE 2 DIABETES MELLITUS WITHOUT COMPLICATION, WITHOUT LONG-TERM CURRENT USE OF INSULIN (HCC): ICD-10-CM

## 2024-07-17 DIAGNOSIS — G47.33 OBSTRUCTIVE SLEEP APNEA: ICD-10-CM

## 2024-07-17 DIAGNOSIS — E78.2 MIXED HYPERLIPIDEMIA: ICD-10-CM

## 2024-07-17 DIAGNOSIS — I10 BENIGN ESSENTIAL HYPERTENSION: Primary | ICD-10-CM

## 2024-07-17 PROCEDURE — 99214 OFFICE O/P EST MOD 30 MIN: CPT | Performed by: NURSE PRACTITIONER

## 2024-07-17 PROCEDURE — G2211 COMPLEX E/M VISIT ADD ON: HCPCS | Performed by: NURSE PRACTITIONER

## 2024-07-17 NOTE — PROGRESS NOTES
FAMILY PRACTICE OFFICE VISIT       NAME: Lori Rodrigues  AGE: 68 y.o. SEX: female       : 1955        MRN: [id number]    Assessment and Plan   1. Benign essential hypertension  Assessment & Plan:  Stable blood pressure on current medications.   Orders:  -     CBC; Future  -     Comprehensive metabolic panel; Future; Expected date: 2025  -     TSH, 3rd generation with Free T4 reflex; Future; Expected date: 2025  2. Mixed hyperlipidemia  Assessment & Plan:  LDL 69 at goal on pravastatin.   Orders:  -     Lipid panel; Future; Expected date: 2025  3. Type 2 diabetes mellitus without complication, without long-term current use of insulin (HCC)  Assessment & Plan:    Lab Results   Component Value Date    HGBA1C 6.5 (H) 2024     A1c at goal on Trulicity and Prandin.     Orders:  -     Hemoglobin A1C; Future; Expected date: 2025  4. Obstructive sleep apnea  Assessment & Plan:  Unable to tolerate CPAP. She is aware of risks associated with untreated ERNESTINE.       Repeat blood work in 6 months and follow up in 6 months or sooner if needed.       Chief Complaint     Chief Complaint   Patient presents with    Follow-up     6m f/u        History of Present Illness     Lori Rodrigues is a 68 year old female presenting today for 6 month check up.     She is feeling well. Has no complaints today.             Review of Systems   Review of Systems   Constitutional: Negative.    HENT: Negative.     Respiratory: Negative.     Cardiovascular: Negative.    Gastrointestinal: Negative.    Genitourinary: Negative.    Musculoskeletal: Negative.    Skin: Negative.    Neurological: Negative.    Hematological: Negative.    Psychiatric/Behavioral: Negative.         I have reviewed the patient's medical history in detail; there are no changes to the history as noted in the electronic medical record.    Objective     Vitals:    24 1416   BP: 136/82   BP Location: Left arm   Patient Position: Sitting  "  Cuff Size: Large   Pulse: 73   Resp: 16   Temp: 97.5 °F (36.4 °C)   TempSrc: Temporal   SpO2: 96%   Weight: 82.3 kg (181 lb 6.4 oz)   Height: 5' 1\" (1.549 m)     Wt Readings from Last 3 Encounters:   07/17/24 82.3 kg (181 lb 6.4 oz)   07/11/24 82.6 kg (182 lb 3.2 oz)   04/29/24 83.2 kg (183 lb 6.4 oz)     Physical Exam  Vitals and nursing note reviewed.   Constitutional:       General: She is not in acute distress.     Appearance: Normal appearance. She is not ill-appearing.   HENT:      Right Ear: Tympanic membrane normal.      Left Ear: Tympanic membrane normal.      Mouth/Throat:      Mouth: Mucous membranes are moist.      Pharynx: Oropharynx is clear.   Eyes:      Conjunctiva/sclera: Conjunctivae normal.      Pupils: Pupils are equal, round, and reactive to light.   Neck:      Thyroid: No thyromegaly.   Cardiovascular:      Rate and Rhythm: Normal rate and regular rhythm.      Heart sounds: No murmur heard.  Pulmonary:      Effort: Pulmonary effort is normal.      Breath sounds: Normal breath sounds.   Musculoskeletal:      Cervical back: Normal range of motion and neck supple.      Right lower leg: No edema.      Left lower leg: No edema.   Lymphadenopathy:      Cervical: No cervical adenopathy.   Neurological:      Mental Status: She is alert.   Psychiatric:         Mood and Affect: Mood normal.         Depression Screening and Follow-up Plan: Patient was screened for depression during today's encounter. They screened negative with a PHQ-2 score of 0.        ALLERGIES:  Allergies   Allergen Reactions    Atorvastatin     Sulfa Antibiotics Myalgia    Sulfamethopyrazine      Joint stiffness    Sulfamethoxazole-Trimethoprim Other (See Comments)     Joint pain       Current Medications     Current Outpatient Medications   Medication Sig Dispense Refill    acetaminophen (TYLENOL) 500 mg tablet Take 500 mg by mouth every 6 (six) hours as needed for mild pain      Blood Glucose Monitoring Suppl (OneTouch Verio Flex " System) w/Device KIT USE DAILY AND WHEN HAVING SYMPTOMS AS DIRECTED TO CHECK BLOOD SUGARS 1 kit 0    carvedilol (COREG) 6.25 mg tablet TAKE 1 TABLET BY MOUTH TWICE A DAY WITH MEALS 180 tablet 1    Cholecalciferol (VITAMIN D3) 1000 units CAPS Take by mouth      dulaglutide (Trulicity) 0.75 MG/0.5ML injection INJECT 0.5 ML (0.75 MG TOTAL) UNDER THE SKIN EVERY 7 DAYS 6 mL 3    famotidine (PEPCID) 10 mg tablet Take 10 mg by mouth daily as needed       Flaxseed Oil OIL by Does not apply route      fluticasone (FLONASE) 50 mcg/act nasal spray 2 sprays into each nostril daily as needed       ibuprofen (MOTRIN) 200 mg tablet Take by mouth every 6 (six) hours as needed for mild pain      Lactobacillus (PROBIOTIC ACIDOPHILUS) CAPS Take 1 tablet daily as supplement.      Lancet Devices (ONE TOUCH DELICA LANCING DEV) MISC Use daily and when having symptoms as directed to check blood sugars 100 each PRN    Lancets (OneTouch Delica Plus [id number]) MISC Use daily and when having symptoms as directed to check blood sugars 100 each 3    loratadine (CLARITIN) 10 mg tablet Take 1 tablet by mouth daily as needed      methocarbamol (ROBAXIN) 500 mg tablet TAKE 1 TABLET (500 MG TOTAL) BY MOUTH 2 (TWO) TIMES A DAY AS NEEDED FOR MUSCLE SPASMS 30 tablet 0    nystatin-triamcinolone (MYCOLOG-II) ointment APPLY TO AFFECTED AREA TWICE A DAY 30 g 1    OneTouch Verio test strip USE DAILY AND WHEN HAVING SYMPTOMS AS DIRECTED TO CHECK BLOOD SUGARS 100 strip PRN    pravastatin (PRAVACHOL) 20 mg tablet TAKE 1 TABLET BY MOUTH EVERYDAY AT BEDTIME 90 tablet 3    repaglinide (PRANDIN) 0.5 mg tablet TAKE 1 TABLET (0.5 MG TOTAL) BY MOUTH DAILY WITH DINNER 90 tablet 3    amLODIPine (NORVASC) 10 mg tablet Take 1 tablet (10 mg total) by mouth daily with dinner 90 tablet 1    lisinopril (ZESTRIL) 40 mg tablet TAKE 1 TABLET (40 MG TOTAL) BY MOUTH DAILY IN THE EARLY MORNING 90 tablet 1     No current facility-administered medications for this visit.          Health Maintenance     Health Maintenance   Topic Date Due    RSV Vaccine Age 60+ Years (1 - 1-dose 60+ series) Never done    Zoster Vaccine (2 of 3) 10/25/2016    PT PLAN OF CARE  10/22/2020    COVID-19 Vaccine (4 - 2023-24 season) 09/01/2023    Influenza Vaccine (1) 09/01/2024    Kidney Health Evaluation: Albumin/Creatinine Ratio  01/12/2025    HEMOGLOBIN A1C  01/12/2025    Fall Risk  01/17/2025    Urinary Incontinence Screening  01/17/2025    Medicare Annual Wellness Visit (AWV)  01/17/2025    Diabetic Foot Exam  04/29/2025    Kidney Health Evaluation: GFR  07/12/2025    Depression Screening  07/17/2025    DM Eye Exam  08/16/2025    Colorectal Cancer Screening  11/02/2025    Breast Cancer Screening: Mammogram  02/08/2026    Hepatitis C Screening  Completed    Osteoporosis Screening  Completed    Pneumococcal Vaccine: 65+ Years  Completed    RSV Vaccine age 0-20 Months  Aged Out    HIB Vaccine  Aged Out    IPV Vaccine  Aged Out    Hepatitis A Vaccine  Aged Out    Meningococcal ACWY Vaccine  Aged Out    HPV Vaccine  Aged Out     Immunization History   Administered Date(s) Administered    COVID-19 PFIZER VACCINE 0.3 ML IM 04/10/2021, 05/01/2021, 12/16/2021    INFLUENZA 01/05/2016, 10/18/2018, 09/18/2022, 11/29/2023    Influenza Quadrivalent Preservative Free 3 years and older IM 10/04/2017    Influenza Split High Dose Preservative Free IM 10/11/2019    Influenza, high dose seasonal 0.7 mL 09/08/2020, 09/10/2021    Influenza, seasonal, injectable 09/01/2014, 01/05/2016, 10/26/2016    Pneumococcal Conjugate 13-Valent 12/18/2018    Pneumococcal Polysaccharide PPV23 07/12/2016, 03/22/2021    Tdap 07/17/2018    Zoster 01/01/2014, 08/30/2016       JOSHUA Pepe

## 2024-07-20 DIAGNOSIS — I10 ESSENTIAL HYPERTENSION: ICD-10-CM

## 2024-07-20 DIAGNOSIS — I10 BENIGN ESSENTIAL HYPERTENSION: ICD-10-CM

## 2024-07-20 RX ORDER — LISINOPRIL 40 MG/1
40 TABLET ORAL
Qty: 90 TABLET | Refills: 1 | Status: SHIPPED | OUTPATIENT
Start: 2024-07-20

## 2024-07-20 RX ORDER — AMLODIPINE BESYLATE 10 MG/1
10 TABLET ORAL
Qty: 90 TABLET | Refills: 1 | Status: SHIPPED | OUTPATIENT
Start: 2024-07-20

## 2024-07-21 NOTE — ASSESSMENT & PLAN NOTE
Lab Results   Component Value Date    HGBA1C 6.5 (H) 07/12/2024     A1c at goal on Trulicity and Prandin.

## 2024-07-27 DIAGNOSIS — B37.31 VAGINAL YEAST INFECTION: ICD-10-CM

## 2024-07-29 RX ORDER — NYSTATIN AND TRIAMCINOLONE ACETONIDE 100000; 1 [USP'U]/G; MG/G
1 OINTMENT TOPICAL 2 TIMES DAILY
Qty: 30 G | Refills: 1 | Status: SHIPPED | OUTPATIENT
Start: 2024-07-29

## 2024-09-03 ENCOUNTER — HOSPITAL ENCOUNTER (OUTPATIENT)
Dept: CT IMAGING | Facility: HOSPITAL | Age: 69
Discharge: HOME | End: 2024-09-03
Payer: COMMERCIAL

## 2024-09-03 DIAGNOSIS — N20.0 RECURRENT NEPHROLITHIASIS: ICD-10-CM

## 2024-09-03 PROCEDURE — G1004 CDSM NDSC: HCPCS

## 2024-09-03 PROCEDURE — 74176 CT ABD & PELVIS W/O CONTRAST: CPT

## 2024-10-01 DIAGNOSIS — B37.31 VAGINAL YEAST INFECTION: ICD-10-CM

## 2024-10-04 ENCOUNTER — TELEPHONE (OUTPATIENT)
Dept: UROLOGY | Facility: AMBULATORY SURGERY CENTER | Age: 69
End: 2024-10-04

## 2024-10-04 NOTE — TELEPHONE ENCOUNTER
----- Message from Antonio Jones PA-C sent at 10/4/2024  3:01 PM EDT -----  Looks like follow-up was never scheduled.  Bilateral nonobstructing calculi.  Please schedule for next available AP visit for in person review.    LMOVM advising of results per communication consent. Return office number provided if pt wishes to schedule follow-up.

## 2024-10-05 RX ORDER — NYSTATIN AND TRIAMCINOLONE ACETONIDE 100000; 1 [USP'U]/G; MG/G
1 OINTMENT TOPICAL 2 TIMES DAILY
Qty: 30 G | Refills: 1 | Status: SHIPPED | OUTPATIENT
Start: 2024-10-05

## 2024-10-09 DIAGNOSIS — E11.9 TYPE 2 DIABETES MELLITUS WITHOUT COMPLICATION, WITHOUT LONG-TERM CURRENT USE OF INSULIN (HCC): ICD-10-CM

## 2024-10-10 RX ORDER — DULAGLUTIDE 0.75 MG/.5ML
0.75 INJECTION, SOLUTION SUBCUTANEOUS
Qty: 6 ML | Refills: 1 | Status: SHIPPED | OUTPATIENT
Start: 2024-10-10

## 2024-12-02 ENCOUNTER — OFFICE VISIT (OUTPATIENT)
Dept: FAMILY MEDICINE CLINIC | Facility: CLINIC | Age: 69
End: 2024-12-02
Payer: COMMERCIAL

## 2024-12-02 VITALS
HEIGHT: 61 IN | SYSTOLIC BLOOD PRESSURE: 158 MMHG | WEIGHT: 183.6 LBS | RESPIRATION RATE: 16 BRPM | BODY MASS INDEX: 34.66 KG/M2 | DIASTOLIC BLOOD PRESSURE: 80 MMHG | TEMPERATURE: 97.6 F | OXYGEN SATURATION: 98 % | HEART RATE: 89 BPM

## 2024-12-02 DIAGNOSIS — I10 BENIGN ESSENTIAL HYPERTENSION: ICD-10-CM

## 2024-12-02 DIAGNOSIS — J32.9 SINUSITIS, UNSPECIFIED CHRONICITY, UNSPECIFIED LOCATION: Primary | ICD-10-CM

## 2024-12-02 PROCEDURE — 99214 OFFICE O/P EST MOD 30 MIN: CPT | Performed by: NURSE PRACTITIONER

## 2024-12-02 PROCEDURE — G2211 COMPLEX E/M VISIT ADD ON: HCPCS | Performed by: NURSE PRACTITIONER

## 2024-12-02 RX ORDER — CARVEDILOL 12.5 MG/1
12.5 TABLET ORAL 2 TIMES DAILY WITH MEALS
Qty: 180 TABLET | Refills: 3 | Status: SHIPPED | OUTPATIENT
Start: 2024-12-02

## 2024-12-02 NOTE — ASSESSMENT & PLAN NOTE
Blood pressure is elevated today and has been at home.   Increase Coreg from 6.25 mg twice daily to 12.5 mg twice daily.   Return in 1 month for BP check.     Orders:    carvedilol (COREG) 12.5 mg tablet; Take 1 tablet (12.5 mg total) by mouth 2 (two) times a day with meals

## 2024-12-02 NOTE — PROGRESS NOTES
Name: Lori Rodrigues      : 1955      MRN: [id number]  Encounter Provider: JOSHUA Pepe  Encounter Date: 2024   Encounter department: [facility]  :  Assessment & Plan  Sinusitis, unspecified chronicity, unspecified location  Start Augmentin, take with food.   Call me if symptoms are not improving in the next one week.     Orders:    amoxicillin-clavulanate (AUGMENTIN) 875-125 mg per tablet; Take 1 tablet by mouth every 12 (twelve) hours for 7 days    Benign essential hypertension  Blood pressure is elevated today and has been at home.   Increase Coreg from 6.25 mg twice daily to 12.5 mg twice daily.   Return in 1 month for BP check.     Orders:    carvedilol (COREG) 12.5 mg tablet; Take 1 tablet (12.5 mg total) by mouth 2 (two) times a day with meals           History of Present Illness     Lori Rodrigues is a 69 year old female presenting today for cold symptoms.     Sinus symptoms for weeks now.   Frontal facial pain and pressure. Nasal congestion. Eye pressure.  Thought it was allergies.   Taking daily antihistamine, coricidin.   Took and advil cold and sinus, did help with sinus pressure, but made her blood pressure higher.     BP has been high.   BP at home last night 157/75. This morning 145/89.  Fasting sugar this morning 109.     Having trouble falling asleep, just lost her dog 2 weeks ago.           Earache   There is pain in both ears. This is a new problem. The current episode started in the past 7 days. The problem occurs every few hours. The problem has been waxing and waning. There has been no fever. The pain is at a severity of 5/10. Associated symptoms include headaches and neck pain.   Sinus Problem  Associated symptoms include ear pain, headaches and neck pain.     Review of Systems   HENT:  Positive for ear pain.    Musculoskeletal:  Positive for neck pain.   Neurological:  Positive for headaches.     Current Outpatient Medications on File Prior to Visit    Medication Sig Dispense Refill    acetaminophen (TYLENOL) 500 mg tablet Take 500 mg by mouth every 6 (six) hours as needed for mild pain      amLODIPine (NORVASC) 10 mg tablet Take 1 tablet (10 mg total) by mouth daily with dinner 90 tablet 1    Blood Glucose Monitoring Suppl (OneTouch Verio Flex System) w/Device KIT USE DAILY AND WHEN HAVING SYMPTOMS AS DIRECTED TO CHECK BLOOD SUGARS 1 kit 0    Cholecalciferol (VITAMIN D3) 1000 units CAPS Take by mouth      dulaglutide (Trulicity) 0.75 MG/0.5ML injection INJECT 0.5 ML (0.75 MG TOTAL) UNDER THE SKIN EVERY 7 DAYS 6 mL 1    famotidine (PEPCID) 10 mg tablet Take 10 mg by mouth daily as needed       Flaxseed Oil OIL by Does not apply route      fluticasone (FLONASE) 50 mcg/act nasal spray 2 sprays into each nostril daily as needed       ibuprofen (MOTRIN) 200 mg tablet Take by mouth every 6 (six) hours as needed for mild pain      Lactobacillus (PROBIOTIC ACIDOPHILUS) CAPS Take 1 tablet daily as supplement.      Lancet Devices (ONE TOUCH DELICA LANCING DEV) MISC Use daily and when having symptoms as directed to check blood sugars 100 each PRN    Lancets (OneTouch Delica Plus [id number]) MISC Use daily and when having symptoms as directed to check blood sugars 100 each 3    lisinopril (ZESTRIL) 40 mg tablet TAKE 1 TABLET (40 MG TOTAL) BY MOUTH DAILY IN THE EARLY MORNING 90 tablet 1    loratadine (CLARITIN) 10 mg tablet Take 1 tablet by mouth daily as needed      methocarbamol (ROBAXIN) 500 mg tablet TAKE 1 TABLET (500 MG TOTAL) BY MOUTH 2 (TWO) TIMES A DAY AS NEEDED FOR MUSCLE SPASMS 30 tablet 0    nystatin-triamcinolone (MYCOLOG-II) ointment APPLY TO AFFECTED AREA TWICE A DAY 30 g 1    OneTouch Verio test strip USE DAILY AND WHEN HAVING SYMPTOMS AS DIRECTED TO CHECK BLOOD SUGARS 100 strip PRN    pravastatin (PRAVACHOL) 20 mg tablet TAKE 1 TABLET BY MOUTH EVERYDAY AT BEDTIME 90 tablet 3    repaglinide (PRANDIN) 0.5 mg tablet TAKE 1 TABLET (0.5 MG TOTAL) BY MOUTH DAILY  "WITH DINNER 90 tablet 3    [DISCONTINUED] carvedilol (COREG) 6.25 mg tablet TAKE 1 TABLET BY MOUTH TWICE A DAY WITH MEALS 180 tablet 1     No current facility-administered medications on file prior to visit.         Objective   /80   Pulse 89   Temp 97.6 °F (36.4 °C) (Temporal)   Resp 16   Ht 5' 1\" (1.549 m)   Wt 83.3 kg (183 lb 9.6 oz)   SpO2 98%   BMI 34.69 kg/m²      Physical Exam  Vitals and nursing note reviewed.   Constitutional:       General: She is not in acute distress.     Appearance: Normal appearance. She is not ill-appearing.   HENT:      Head: Atraumatic.      Right Ear: Tympanic membrane normal.      Left Ear: There is impacted cerumen.      Nose: Congestion present.      Comments: Swollen, red turbinates. Purulent nasal drainage on left nares.     Mouth/Throat:      Mouth: Mucous membranes are moist.      Pharynx: Oropharynx is clear.   Cardiovascular:      Rate and Rhythm: Normal rate and regular rhythm.      Heart sounds: No murmur heard.  Pulmonary:      Effort: Pulmonary effort is normal. No respiratory distress.      Breath sounds: Normal breath sounds. No wheezing or rales.   Musculoskeletal:      Cervical back: Normal range of motion and neck supple.   Lymphadenopathy:      Cervical: No cervical adenopathy.   Neurological:      Mental Status: She is alert.   Psychiatric:         Mood and Affect: Mood normal.         "

## 2024-12-03 RX ORDER — AMLODIPINE BESYLATE 10 MG/1
10 TABLET ORAL
Qty: 90 TABLET | Refills: 1 | Status: SHIPPED | OUTPATIENT
Start: 2024-12-03

## 2025-01-10 ENCOUNTER — RA CDI HCC (OUTPATIENT)
Dept: OTHER | Facility: HOSPITAL | Age: 70
End: 2025-01-10

## 2025-01-14 DIAGNOSIS — I10 BENIGN ESSENTIAL HYPERTENSION: ICD-10-CM

## 2025-01-14 DIAGNOSIS — E78.2 MIXED HYPERLIPIDEMIA: ICD-10-CM

## 2025-01-15 RX ORDER — CARVEDILOL 6.25 MG/1
6.25 TABLET ORAL 2 TIMES DAILY WITH MEALS
Qty: 180 TABLET | Refills: 1 | OUTPATIENT
Start: 2025-01-15

## 2025-01-16 ENCOUNTER — RESULTS FOLLOW-UP (OUTPATIENT)
Dept: FAMILY MEDICINE CLINIC | Facility: CLINIC | Age: 70
End: 2025-01-16

## 2025-01-16 LAB
ALBUMIN SERPL-MCNC: 4.3 G/DL (ref 3.6–5.1)
ALBUMIN/GLOB SERPL: 1.9 (CALC) (ref 1–2.5)
ALP SERPL-CCNC: 77 U/L (ref 37–153)
ALT SERPL-CCNC: 20 U/L (ref 6–29)
AST SERPL-CCNC: 15 U/L (ref 10–35)
BASOPHILS # BLD AUTO: 50 CELLS/UL (ref 0–200)
BASOPHILS NFR BLD AUTO: 0.9 %
BILIRUB SERPL-MCNC: 0.5 MG/DL (ref 0.2–1.2)
BUN SERPL-MCNC: 19 MG/DL (ref 7–25)
BUN/CREAT SERPL: ABNORMAL (CALC) (ref 6–22)
CALCIUM SERPL-MCNC: 9.5 MG/DL (ref 8.6–10.4)
CHLORIDE SERPL-SCNC: 104 MMOL/L (ref 98–110)
CHOLEST SERPL-MCNC: 170 MG/DL
CHOLEST/HDLC SERPL: 2.7 (CALC)
CO2 SERPL-SCNC: 31 MMOL/L (ref 20–32)
CREAT SERPL-MCNC: 0.87 MG/DL (ref 0.6–1)
EOSINOPHIL # BLD AUTO: 121 CELLS/UL (ref 15–500)
EOSINOPHIL NFR BLD AUTO: 2.2 %
ERYTHROCYTE [DISTWIDTH] IN BLOOD BY AUTOMATED COUNT: 13 % (ref 11–15)
GFR/BSA.PRED SERPLBLD CYS-BASED-ARV: 71 ML/MIN/1.73M2
GLOBULIN SER CALC-MCNC: 2.3 G/DL (CALC) (ref 1.9–3.7)
GLUCOSE SERPL-MCNC: 145 MG/DL (ref 65–99)
HBA1C MFR BLD: 6.7 % OF TOTAL HGB
HCT VFR BLD AUTO: 46.2 % (ref 35–45)
HDLC SERPL-MCNC: 62 MG/DL
HGB BLD-MCNC: 15.2 G/DL (ref 11.7–15.5)
LDLC SERPL CALC-MCNC: 88 MG/DL (CALC)
LYMPHOCYTES # BLD AUTO: 1573 CELLS/UL (ref 850–3900)
LYMPHOCYTES NFR BLD AUTO: 28.6 %
MCH RBC QN AUTO: 30 PG (ref 27–33)
MCHC RBC AUTO-ENTMCNC: 32.9 G/DL (ref 32–36)
MCV RBC AUTO: 91.1 FL (ref 80–100)
MONOCYTES # BLD AUTO: 611 CELLS/UL (ref 200–950)
MONOCYTES NFR BLD AUTO: 11.1 %
NEUTROPHILS # BLD AUTO: 3146 CELLS/UL (ref 1500–7800)
NEUTROPHILS NFR BLD AUTO: 57.2 %
NONHDLC SERPL-MCNC: 108 MG/DL (CALC)
PLATELET # BLD AUTO: 303 THOUSAND/UL (ref 140–400)
PMV BLD REES-ECKER: 12.1 FL (ref 7.5–12.5)
POTASSIUM SERPL-SCNC: 4.4 MMOL/L (ref 3.5–5.3)
PROT SERPL-MCNC: 6.6 G/DL (ref 6.1–8.1)
RBC # BLD AUTO: 5.07 MILLION/UL (ref 3.8–5.1)
SODIUM SERPL-SCNC: 142 MMOL/L (ref 135–146)
TRIGL SERPL-MCNC: 106 MG/DL
TSH SERPL-ACNC: 0.92 MIU/L (ref 0.4–4.5)
WBC # BLD AUTO: 5.5 THOUSAND/UL (ref 3.8–10.8)

## 2025-01-27 ENCOUNTER — OFFICE VISIT (OUTPATIENT)
Dept: FAMILY MEDICINE CLINIC | Facility: CLINIC | Age: 70
End: 2025-01-27
Payer: COMMERCIAL

## 2025-01-27 VITALS
HEIGHT: 61 IN | WEIGHT: 186 LBS | OXYGEN SATURATION: 97 % | TEMPERATURE: 97.5 F | HEART RATE: 71 BPM | SYSTOLIC BLOOD PRESSURE: 150 MMHG | BODY MASS INDEX: 35.12 KG/M2 | DIASTOLIC BLOOD PRESSURE: 78 MMHG | RESPIRATION RATE: 16 BRPM

## 2025-01-27 DIAGNOSIS — E55.9 HYPOVITAMINOSIS D: ICD-10-CM

## 2025-01-27 DIAGNOSIS — D58.2 ELEVATED HEMOGLOBIN (HCC): ICD-10-CM

## 2025-01-27 DIAGNOSIS — K21.9 GASTROESOPHAGEAL REFLUX DISEASE WITHOUT ESOPHAGITIS: ICD-10-CM

## 2025-01-27 DIAGNOSIS — E66.812 CLASS 2 SEVERE OBESITY DUE TO EXCESS CALORIES WITH SERIOUS COMORBIDITY AND BODY MASS INDEX (BMI) OF 35.0 TO 35.9 IN ADULT (HCC): ICD-10-CM

## 2025-01-27 DIAGNOSIS — E66.01 CLASS 2 SEVERE OBESITY DUE TO EXCESS CALORIES WITH SERIOUS COMORBIDITY AND BODY MASS INDEX (BMI) OF 35.0 TO 35.9 IN ADULT (HCC): ICD-10-CM

## 2025-01-27 DIAGNOSIS — E11.9 TYPE 2 DIABETES MELLITUS WITHOUT COMPLICATION, WITHOUT LONG-TERM CURRENT USE OF INSULIN (HCC): ICD-10-CM

## 2025-01-27 DIAGNOSIS — I10 BENIGN ESSENTIAL HYPERTENSION: ICD-10-CM

## 2025-01-27 DIAGNOSIS — E04.2 MULTIPLE THYROID NODULES: ICD-10-CM

## 2025-01-27 DIAGNOSIS — M85.80 OSTEOPENIA, UNSPECIFIED LOCATION: ICD-10-CM

## 2025-01-27 DIAGNOSIS — Z00.00 MEDICARE ANNUAL WELLNESS VISIT, SUBSEQUENT: Primary | ICD-10-CM

## 2025-01-27 DIAGNOSIS — E78.2 MIXED HYPERLIPIDEMIA: ICD-10-CM

## 2025-01-27 LAB
CREAT UR-MCNC: 84.6 MG/DL
MICROALBUMIN UR-MCNC: 9.5 MG/L
MICROALBUMIN/CREAT 24H UR: 11 MG/G CREATININE (ref 0–30)

## 2025-01-27 PROCEDURE — G2211 COMPLEX E/M VISIT ADD ON: HCPCS | Performed by: NURSE PRACTITIONER

## 2025-01-27 PROCEDURE — 99214 OFFICE O/P EST MOD 30 MIN: CPT | Performed by: NURSE PRACTITIONER

## 2025-01-27 PROCEDURE — 82043 UR ALBUMIN QUANTITATIVE: CPT | Performed by: NURSE PRACTITIONER

## 2025-01-27 PROCEDURE — 82570 ASSAY OF URINE CREATININE: CPT | Performed by: NURSE PRACTITIONER

## 2025-01-27 PROCEDURE — G0439 PPPS, SUBSEQ VISIT: HCPCS | Performed by: NURSE PRACTITIONER

## 2025-01-27 RX ORDER — CHLORTHALIDONE 25 MG/1
25 TABLET ORAL DAILY
Qty: 90 TABLET | Refills: 3 | Status: SHIPPED | OUTPATIENT
Start: 2025-01-27

## 2025-01-27 NOTE — ASSESSMENT & PLAN NOTE
Lab Results   Component Value Date    HGBA1C 6.7 (H) 01/15/2025     A1c 6.7%.   Continue Trulicity and Prandin.   Increase physical activity.

## 2025-01-27 NOTE — ASSESSMENT & PLAN NOTE
Dexa scan up to date as of Summer 2024.   Continue adequate calcium, vitamin D.   Get regular weight bearing exercise.

## 2025-01-27 NOTE — PROGRESS NOTES
Name: Lori Rodrigues      : 1955      MRN: [id number]  Encounter Provider: JOSHUA Pepe  Encounter Date: 2025   Encounter department: [facility]    Assessment & Plan  Medicare annual wellness visit, subsequent         Type 2 diabetes mellitus without complication, without long-term current use of insulin ([facility])    Lab Results   Component Value Date    HGBA1C 6.7 (H) 01/15/2025     A1c 6.7%.   Continue Trulicity and Prandin.   Increase physical activity.        Osteopenia, unspecified location  Dexa scan up to date as of Summer 2024.   Continue adequate calcium, vitamin D.   Get regular weight bearing exercise.        Multiple thyroid nodules  Multiple thyroid nodules, not requiring biopsy or further follow up.  Last thyroid US .       Mixed hyperlipidemia  LDL 88.   Continue Pravastatin.        Hypovitaminosis D  Continue current vitamin D3 supplement.   Repeat level with next blood work.        Gastroesophageal reflux disease without esophagitis  Pepcid as needed.        Elevated hemoglobin ([facility])  Hemoglobin 15.2 currently, typically 15.5-16.3. Likely from untreated ERNESTINE.  Continue to  monitor.         Class 2 severe obesity due to excess calories with serious comorbidity and body mass index (BMI) of 35.0 to 35.9 in adult ([facility])  Continue to work on weight loss, regular physical activity.        Benign essential hypertension  Blood pressure is elevated today.   Continue current medications, and add chlorthalidone 25 mg daily.   BMP in 7-10 days.   Follow up in 1 month for BP check.     Orders:  •  chlorthalidone 25 mg tablet; Take 1 tablet (25 mg total) by mouth daily  •  Basic metabolic panel; Future      BMP in 7-10 days.   Follow up in one month for BP check.     Depression Screening and Follow-up Plan: Patient was screened for depression during today's encounter. They screened negative with a PHQ-2 score of 1.      Preventive health issues were discussed with patient,  and age appropriate screening tests were ordered as noted in patient's After Visit Summary. Personalized health advice and appropriate referrals for health education or preventive services given if needed, as noted in patient's After Visit Summary.    History of Present Illness     Lori Rodrigues is a 69 year old female presenting today for annual exam.     She feels well and has no complaints today.     Home blood pressure 152/69 few days ago.   Continues to grieve loss of her dog.            Patient Care Team:  JOSHUA Pepe as PCP - General (Family Medicine)  MD Chang Flynn OD (Optometry)  C William Riedel, DO (Obstetrics and Gynecology)    Review of Systems   Constitutional: Negative.    HENT: Negative.     Respiratory: Negative.     Cardiovascular: Negative.    Gastrointestinal: Negative.    Genitourinary: Negative.    Musculoskeletal: Negative.    Skin: Negative.    Neurological: Negative.    Hematological: Negative.    Psychiatric/Behavioral: Negative.       Medical History Reviewed by provider this encounter:  Tobacco  Allergies  Meds  Problems  Med Hx  Surg Hx  Fam Hx       Annual Wellness Visit Questionnaire   Lori is here for her Subsequent Wellness visit.     Health Risk Assessment:   Patient rates overall health as good. Patient feels that their physical health rating is same. Patient is satisfied with their life. Eyesight was rated as same. Hearing was rated as same. Patient feels that their emotional and mental health rating is same. Patients states they are sometimes angry. Patient states they are sometimes unusually tired/fatigued. Pain experienced in the last 7 days has been some. Patient's pain rating has been 2/10. Patient states that she has experienced no weight loss or gain in last 6 months.     Depression Screening:   PHQ-2 Score: 1      Fall Risk Screening:   In the past year, patient has experienced: history of falling in past year      Urinary Incontinence  Screening:   Patient has leaked urine accidently in the last six months.     Home Safety:  Patient does not have trouble with stairs inside or outside of their home. Patient has working smoke alarms and has working carbon monoxide detector. Home safety hazards include: none.     Nutrition:   Current diet is Regular.     Medications:   Patient is currently taking over-the-counter supplements. OTC medications include: see medication list. Patient is able to manage medications.     Activities of Daily Living (ADLs)/Instrumental Activities of Daily Living (IADLs):   Walk and transfer into and out of bed and chair?: Yes  Dress and groom yourself?: Yes    Bathe or shower yourself?: Yes    Feed yourself? Yes  Do your laundry/housekeeping?: Yes  Manage your money, pay your bills and track your expenses?: Yes  Make your own meals?: Yes    Do your own shopping?: Yes    Durable Medical Equipment Suppliers  none    Previous Hospitalizations:   Any hospitalizations or ED visits within the last 12 months?: No      Advance Care Planning:   Living will: Yes    Durable POA for healthcare: Yes    Advanced directive: Yes    End of Life Decisions reviewed with patient: Yes      Comments:  is POA, if he is not available, her son is her next POA.       Cognitive Screening:   Provider or family/friend/caregiver concerned regarding cognition?: No    PREVENTIVE SCREENINGS      Cardiovascular Screening:    General: Screening Not Indicated and History Lipid Disorder      Diabetes Screening:     General: Screening Not Indicated and History Diabetes      Colorectal Cancer Screening:     General: Screening Current      Breast Cancer Screening:     General: Screening Current      Cervical Cancer Screening:    General: Screening Not Indicated      Osteoporosis Screening:    General: Screening Current      Abdominal Aortic Aneurysm (AAA) Screening:        General: Screening Not Indicated      Lung Cancer Screening:     General: Screening Not  Indicated      Hepatitis C Screening:    General: Screening Current    Screening, Brief Intervention, and Referral to Treatment (SBIRT)    Screening  Typical number of drinks in a day: 0  Typical number of drinks in a week: 0  Interpretation: Low risk drinking behavior.    AUDIT-C Screenin) How often did you have a drink containing alcohol in the past year? never  2) How many drinks did you have on a typical day when you were drinking in the past year? 0  3) How often did you have 6 or more drinks on one occasion in the past year? never    AUDIT-C Score: 0  Interpretation: Score 0-2 (female): Negative screen for alcohol misuse    Single Item Drug Screening:  How often have you used an illegal drug (including marijuana) or a prescription medication for non-medical reasons in the past year? never    Single Item Drug Screen Score: 0  Interpretation: Negative screen for possible drug use disorder    Brief Intervention  Alcohol & drug use screenings were reviewed. No concerns regarding substance use disorder identified.     Other Counseling Topics:   Calcium and vitamin D intake and regular weightbearing exercise.     Social Drivers of Health     Financial Resource Strain: Low Risk  (1/10/2024)    Overall Financial Resource Strain (CARDIA)    • Difficulty of Paying Living Expenses: Not hard at all   Food Insecurity: No Food Insecurity (2025)    Hunger Vital Sign    • Worried About Running Out of Food in the Last Year: Never true    • Ran Out of Food in the Last Year: Never true   Transportation Needs: No Transportation Needs (2025)    PRAPARE - Transportation    • Lack of Transportation (Medical): No    • Lack of Transportation (Non-Medical): No   Housing Stability: Low Risk  (2025)    Housing Stability Vital Sign    • Unable to Pay for Housing in the Last Year: No    • Number of Times Moved in the Last Year: 1    • Homeless in the Last Year: No   Utilities: Not At Risk (2025)    [facility] Utilities     "• Threatened with loss of utilities: No     No results found.    Objective   /78 (BP Location: Left arm)   Pulse 71   Temp 97.5 °F (36.4 °C) (Temporal)   Resp 16   Ht 5' 1\" (1.549 m)   Wt 84.4 kg (186 lb)   SpO2 97%   BMI 35.14 kg/m²     Physical Exam  Vitals and nursing note reviewed.   Constitutional:       General: She is not in acute distress.     Appearance: Normal appearance. She is not ill-appearing.   HENT:      Head: Atraumatic.      Right Ear: Tympanic membrane normal.      Left Ear: Tympanic membrane normal.      Mouth/Throat:      Mouth: Mucous membranes are moist.      Pharynx: Oropharynx is clear.   Eyes:      Conjunctiva/sclera: Conjunctivae normal.      Pupils: Pupils are equal, round, and reactive to light.   Neck:      Thyroid: No thyromegaly.      Vascular: No carotid bruit.   Cardiovascular:      Rate and Rhythm: Normal rate and regular rhythm.      Heart sounds: No murmur heard.  Pulmonary:      Effort: Pulmonary effort is normal. No respiratory distress.      Breath sounds: Normal breath sounds. No wheezing or rales.   Musculoskeletal:      Cervical back: Normal range of motion and neck supple.      Right lower leg: No edema.      Left lower leg: No edema.   Lymphadenopathy:      Cervical: No cervical adenopathy.   Skin:     General: Skin is warm and dry.      Findings: No rash.   Neurological:      Mental Status: She is alert.   Psychiatric:         Mood and Affect: Mood normal.         Current Outpatient Medications:   •  acetaminophen (TYLENOL) 500 mg tablet, Take 500 mg by mouth every 6 (six) hours as needed for mild pain, Disp: , Rfl:   •  amLODIPine (NORVASC) 10 mg tablet, TAKE 1 TABLET BY MOUTH DAILY WITH DINNER, Disp: 90 tablet, Rfl: 1  •  Blood Glucose Monitoring Suppl (OneTouch Verio Flex System) w/Device KIT, USE DAILY AND WHEN HAVING SYMPTOMS AS DIRECTED TO CHECK BLOOD SUGARS, Disp: 1 kit, Rfl: 0  •  carvedilol (COREG) 12.5 mg tablet, Take 1 tablet (12.5 mg total) by " mouth 2 (two) times a day with meals, Disp: 180 tablet, Rfl: 3  •  chlorthalidone 25 mg tablet, Take 1 tablet (25 mg total) by mouth daily, Disp: 90 tablet, Rfl: 3  •  Cholecalciferol (VITAMIN D3) 1000 units CAPS, Take by mouth, Disp: , Rfl:   •  dulaglutide (Trulicity) 0.75 MG/0.5ML injection, INJECT 0.5 ML (0.75 MG TOTAL) UNDER THE SKIN EVERY 7 DAYS, Disp: 6 mL, Rfl: 1  •  famotidine (PEPCID) 10 mg tablet, Take 10 mg by mouth daily as needed , Disp: , Rfl:   •  Flaxseed Oil OIL, by Does not apply route, Disp: , Rfl:   •  fluticasone (FLONASE) 50 mcg/act nasal spray, 2 sprays into each nostril daily as needed , Disp: , Rfl:   •  ibuprofen (MOTRIN) 200 mg tablet, Take by mouth every 6 (six) hours as needed for mild pain, Disp: , Rfl:   •  Lactobacillus (PROBIOTIC ACIDOPHILUS) CAPS, Take 1 tablet daily as supplement., Disp: , Rfl:   •  Lancet Devices (ONE TOUCH DELICA LANCING DEV) MISC, Use daily and when having symptoms as directed to check blood sugars, Disp: 100 each, Rfl: PRN  •  Lancets (OneTouch Delica Plus [id number]) MISC, Use daily and when having symptoms as directed to check blood sugars, Disp: 100 each, Rfl: 3  •  lisinopril (ZESTRIL) 40 mg tablet, TAKE 1 TABLET (40 MG TOTAL) BY MOUTH DAILY IN THE EARLY MORNING, Disp: 90 tablet, Rfl: 1  •  loratadine (CLARITIN) 10 mg tablet, Take 1 tablet by mouth daily as needed, Disp: , Rfl:   •  methocarbamol (ROBAXIN) 500 mg tablet, TAKE 1 TABLET (500 MG TOTAL) BY MOUTH 2 (TWO) TIMES A DAY AS NEEDED FOR MUSCLE SPASMS, Disp: 30 tablet, Rfl: 0  •  nystatin-triamcinolone (MYCOLOG-II) ointment, APPLY TO AFFECTED AREA TWICE A DAY, Disp: 30 g, Rfl: 1  •  OneTouch Verio test strip, USE DAILY AND WHEN HAVING SYMPTOMS AS DIRECTED TO CHECK BLOOD SUGARS, Disp: 100 strip, Rfl: PRN  •  pravastatin (PRAVACHOL) 20 mg tablet, TAKE 1 TABLET BY MOUTH EVERYDAY AT BEDTIME, Disp: 90 tablet, Rfl: 3  •  repaglinide (PRANDIN) 0.5 mg tablet, TAKE 1 TABLET (0.5 MG TOTAL) BY MOUTH DAILY WITH  DINNER, Disp: 90 tablet, Rfl: 3

## 2025-01-27 NOTE — ASSESSMENT & PLAN NOTE
Blood pressure is elevated today.   Continue current medications, and add chlorthalidone 25 mg daily.   BMP in 7-10 days.   Follow up in 1 month for BP check.     Orders:  •  chlorthalidone 25 mg tablet; Take 1 tablet (25 mg total) by mouth daily  •  Basic metabolic panel; Future

## 2025-01-27 NOTE — PATIENT INSTRUCTIONS

## 2025-01-27 NOTE — ASSESSMENT & PLAN NOTE
Hemoglobin 15.2 currently, typically 15.5-16.3. Likely from untreated ERNESTINE.  Continue to  monitor.

## 2025-01-28 DIAGNOSIS — E11.9 TYPE 2 DIABETES MELLITUS WITHOUT COMPLICATION, WITHOUT LONG-TERM CURRENT USE OF INSULIN (HCC): ICD-10-CM

## 2025-01-28 DIAGNOSIS — E78.5 DYSLIPIDEMIA: ICD-10-CM

## 2025-01-28 DIAGNOSIS — I10 ESSENTIAL HYPERTENSION: ICD-10-CM

## 2025-01-29 RX ORDER — PRAVASTATIN SODIUM 20 MG
20 TABLET ORAL
Qty: 90 TABLET | Refills: 1 | Status: SHIPPED | OUTPATIENT
Start: 2025-01-29

## 2025-01-29 RX ORDER — LISINOPRIL 40 MG/1
40 TABLET ORAL
Qty: 90 TABLET | Refills: 1 | Status: SHIPPED | OUTPATIENT
Start: 2025-01-29

## 2025-01-29 RX ORDER — REPAGLINIDE 0.5 MG/1
0.5 TABLET ORAL
Qty: 90 TABLET | Refills: 1 | Status: SHIPPED | OUTPATIENT
Start: 2025-01-29

## 2025-02-21 ENCOUNTER — RA CDI HCC (OUTPATIENT)
Dept: OTHER | Facility: HOSPITAL | Age: 70
End: 2025-02-21

## 2025-02-25 LAB
BUN SERPL-MCNC: 22 MG/DL (ref 7–25)
BUN/CREAT SERPL: 21 (CALC) (ref 6–22)
CALCIUM SERPL-MCNC: 9.3 MG/DL (ref 8.6–10.4)
CHLORIDE SERPL-SCNC: 101 MMOL/L (ref 98–110)
CO2 SERPL-SCNC: 32 MMOL/L (ref 20–32)
CREAT SERPL-MCNC: 1.03 MG/DL (ref 0.6–1)
GFR/BSA.PRED SERPLBLD CYS-BASED-ARV: 58 ML/MIN/1.73M2
GLUCOSE SERPL-MCNC: 161 MG/DL (ref 65–99)
POTASSIUM SERPL-SCNC: 4.5 MMOL/L (ref 3.5–5.3)
SODIUM SERPL-SCNC: 139 MMOL/L (ref 135–146)

## 2025-02-27 ENCOUNTER — RESULTS FOLLOW-UP (OUTPATIENT)
Dept: FAMILY MEDICINE CLINIC | Facility: CLINIC | Age: 70
End: 2025-02-27

## 2025-02-28 ENCOUNTER — OFFICE VISIT (OUTPATIENT)
Dept: FAMILY MEDICINE CLINIC | Facility: CLINIC | Age: 70
End: 2025-02-28
Payer: COMMERCIAL

## 2025-02-28 VITALS
OXYGEN SATURATION: 96 % | SYSTOLIC BLOOD PRESSURE: 136 MMHG | WEIGHT: 186.25 LBS | DIASTOLIC BLOOD PRESSURE: 74 MMHG | RESPIRATION RATE: 17 BRPM | TEMPERATURE: 97.8 F | HEART RATE: 81 BPM | BODY MASS INDEX: 35.16 KG/M2 | HEIGHT: 61 IN

## 2025-02-28 DIAGNOSIS — I10 BENIGN ESSENTIAL HYPERTENSION: Primary | ICD-10-CM

## 2025-02-28 DIAGNOSIS — E78.2 MIXED HYPERLIPIDEMIA: ICD-10-CM

## 2025-02-28 DIAGNOSIS — E11.9 TYPE 2 DIABETES MELLITUS WITHOUT COMPLICATION, WITHOUT LONG-TERM CURRENT USE OF INSULIN (HCC): ICD-10-CM

## 2025-02-28 PROCEDURE — 99213 OFFICE O/P EST LOW 20 MIN: CPT | Performed by: NURSE PRACTITIONER

## 2025-02-28 PROCEDURE — G2211 COMPLEX E/M VISIT ADD ON: HCPCS | Performed by: NURSE PRACTITIONER

## 2025-02-28 NOTE — ASSESSMENT & PLAN NOTE
Lab Results   Component Value Date    HGBA1C 6.7 (H) 01/15/2025     Orders:  •  Hemoglobin A1C; Future

## 2025-02-28 NOTE — ASSESSMENT & PLAN NOTE
Continue lisinopril, amlodipine, carvedilol.   Did not tolerate chlorthalidone.   BP acceptable today 136/74.   Creatinine on recent BMP slightly elevated from combination of chlorthalidone with diarrhea.   Diarrhea resolved now. Push fluids.  Will monitor.   She will follow up in 6 months.     Orders:  •  CBC; Future  •  Comprehensive metabolic panel; Future  •  TSH, 3rd generation with Free T4 reflex; Future

## 2025-03-09 DIAGNOSIS — E78.2 MIXED HYPERLIPIDEMIA: ICD-10-CM

## 2025-03-09 DIAGNOSIS — I10 BENIGN ESSENTIAL HYPERTENSION: ICD-10-CM

## 2025-03-17 RX ORDER — CARVEDILOL 6.25 MG/1
6.25 TABLET ORAL 2 TIMES DAILY WITH MEALS
Qty: 180 TABLET | Refills: 1 | OUTPATIENT
Start: 2025-03-17

## 2025-03-17 NOTE — TELEPHONE ENCOUNTER
Amy Rizvi MA to Me Regarding result: carvedilol (COREG) 6.25 mg tablet [Pharmacy Med Name: CARVEDILOL 6.25 MG TABLET]   RL    3/13/25 10:59 AM   This is not the correct dose. Please refused script.

## 2025-04-02 DIAGNOSIS — I10 BENIGN ESSENTIAL HYPERTENSION: ICD-10-CM

## 2025-04-02 DIAGNOSIS — E78.2 MIXED HYPERLIPIDEMIA: ICD-10-CM

## 2025-04-02 RX ORDER — CARVEDILOL 6.25 MG/1
6.25 TABLET ORAL 2 TIMES DAILY WITH MEALS
Qty: 180 TABLET | Refills: 1 | OUTPATIENT
Start: 2025-04-02

## 2025-06-07 DIAGNOSIS — I10 ESSENTIAL HYPERTENSION: ICD-10-CM

## 2025-06-07 DIAGNOSIS — E78.2 MIXED HYPERLIPIDEMIA: ICD-10-CM

## 2025-06-07 DIAGNOSIS — I10 BENIGN ESSENTIAL HYPERTENSION: ICD-10-CM

## 2025-06-07 DIAGNOSIS — E11.9 TYPE 2 DIABETES MELLITUS WITHOUT COMPLICATION, WITHOUT LONG-TERM CURRENT USE OF INSULIN (HCC): ICD-10-CM

## 2025-06-08 RX ORDER — AMLODIPINE BESYLATE 10 MG/1
10 TABLET ORAL
Qty: 90 TABLET | Refills: 1 | Status: SHIPPED | OUTPATIENT
Start: 2025-06-08

## 2025-06-08 RX ORDER — DULAGLUTIDE 0.75 MG/.5ML
INJECTION, SOLUTION SUBCUTANEOUS
Qty: 2 ML | Refills: 5 | Status: SHIPPED | OUTPATIENT
Start: 2025-06-08 | End: 2025-06-10 | Stop reason: SDUPTHER

## 2025-06-08 RX ORDER — LISINOPRIL 40 MG/1
40 TABLET ORAL
Qty: 90 TABLET | Refills: 1 | Status: SHIPPED | OUTPATIENT
Start: 2025-06-08

## 2025-06-09 RX ORDER — CARVEDILOL 6.25 MG/1
6.25 TABLET ORAL 2 TIMES DAILY WITH MEALS
Qty: 180 TABLET | Refills: 1 | Status: SHIPPED | OUTPATIENT
Start: 2025-06-09

## 2025-06-10 DIAGNOSIS — E11.9 TYPE 2 DIABETES MELLITUS WITHOUT COMPLICATION, WITHOUT LONG-TERM CURRENT USE OF INSULIN (HCC): ICD-10-CM

## 2025-06-10 DIAGNOSIS — E78.5 DYSLIPIDEMIA: ICD-10-CM

## 2025-06-10 DIAGNOSIS — I10 BENIGN ESSENTIAL HYPERTENSION: ICD-10-CM

## 2025-06-10 RX ORDER — PRAVASTATIN SODIUM 20 MG
20 TABLET ORAL
Qty: 90 TABLET | Refills: 1 | OUTPATIENT
Start: 2025-06-10

## 2025-06-10 NOTE — TELEPHONE ENCOUNTER
THIS IS NOT A DUPLICATE     Reason for call: Patient requesting 90 days supply per Insurance/copay  [x] Refill   [] Prior Auth  [] Other:     Office:   [x] PCP/Provider - Ariadna L Martinez, CRNP    [facility]   [] Specialty/Provider -     Medication: Dulaglutide (Trulicity)     Dose/Frequency: 0.75 MG/0.5ML      INJECT 0.5 ML (0.75 MG TOTAL) EVERY 7 DAYS    Quantity: 6ml     90 days supply patient request    Pharmacy: [id number]   Does the patient have enough for 3 days?   [x] Yes   [] No - Send as HP to POD    Mail Away Pharmacy   Does the patient have enough for 10 days?   [] Yes   [] No - Send as HP to POD

## 2025-06-11 RX ORDER — REPAGLINIDE 0.5 MG/1
0.5 TABLET ORAL
Qty: 90 TABLET | Refills: 1 | Status: SHIPPED | OUTPATIENT
Start: 2025-06-11

## 2025-06-11 RX ORDER — DULAGLUTIDE 0.75 MG/.5ML
INJECTION, SOLUTION SUBCUTANEOUS
Qty: 6 ML | Refills: 1 | Status: SHIPPED | OUTPATIENT
Start: 2025-06-11

## 2025-06-11 RX ORDER — CARVEDILOL 12.5 MG/1
12.5 TABLET ORAL 2 TIMES DAILY WITH MEALS
Qty: 180 TABLET | Refills: 1 | Status: SHIPPED | OUTPATIENT
Start: 2025-06-11

## 2025-06-20 ENCOUNTER — HOSPITAL ENCOUNTER (OUTPATIENT)
Dept: MAMMOGRAPHY | Facility: IMAGING CENTER | Age: 70
Discharge: HOME | End: 2025-06-20
Payer: COMMERCIAL

## 2025-06-20 VITALS — WEIGHT: 183 LBS | BODY MASS INDEX: 34.55 KG/M2 | HEIGHT: 61 IN

## 2025-06-20 DIAGNOSIS — Z12.31 ENCOUNTER FOR SCREENING MAMMOGRAM FOR MALIGNANT NEOPLASM OF BREAST: ICD-10-CM

## 2025-06-20 PROCEDURE — 77067 SCR MAMMO BI INCL CAD: CPT

## 2025-06-20 PROCEDURE — 77063 BREAST TOMOSYNTHESIS BI: CPT

## 2025-06-27 ENCOUNTER — OFFICE VISIT (OUTPATIENT)
Dept: CARDIOLOGY CLINIC | Facility: CLINIC | Age: 70
End: 2025-06-27
Payer: COMMERCIAL

## 2025-06-27 VITALS
DIASTOLIC BLOOD PRESSURE: 78 MMHG | SYSTOLIC BLOOD PRESSURE: 138 MMHG | HEART RATE: 72 BPM | HEIGHT: 61 IN | WEIGHT: 184 LBS | BODY MASS INDEX: 34.74 KG/M2

## 2025-06-27 DIAGNOSIS — E78.2 MIXED HYPERLIPIDEMIA: ICD-10-CM

## 2025-06-27 DIAGNOSIS — E11.9 TYPE 2 DIABETES MELLITUS WITHOUT COMPLICATION, WITHOUT LONG-TERM CURRENT USE OF INSULIN (HCC): ICD-10-CM

## 2025-06-27 DIAGNOSIS — I10 BENIGN ESSENTIAL HYPERTENSION: Primary | ICD-10-CM

## 2025-06-27 DIAGNOSIS — I65.23 CAROTID STENOSIS, ASYMPTOMATIC, BILATERAL: ICD-10-CM

## 2025-06-27 DIAGNOSIS — G47.33 OBSTRUCTIVE SLEEP APNEA: ICD-10-CM

## 2025-06-27 DIAGNOSIS — E78.5 DYSLIPIDEMIA: ICD-10-CM

## 2025-06-27 LAB
ATRIAL RATE: 72 BPM
P AXIS: 47 DEGREES
PR INTERVAL: 166 MS
QRS AXIS: 62 DEGREES
QRSD INTERVAL: 90 MS
QT INTERVAL: 394 MS
QTC INTERVAL: 431 MS
T WAVE AXIS: 21 DEGREES
VENTRICULAR RATE: 72 BPM

## 2025-06-27 PROCEDURE — 93000 ELECTROCARDIOGRAM COMPLETE: CPT | Performed by: INTERNAL MEDICINE

## 2025-06-27 PROCEDURE — 99214 OFFICE O/P EST MOD 30 MIN: CPT | Performed by: INTERNAL MEDICINE

## 2025-06-27 RX ORDER — PRAVASTATIN SODIUM 40 MG
40 TABLET ORAL
Qty: 90 TABLET | Refills: 3 | Status: SHIPPED | OUTPATIENT
Start: 2025-06-27
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
## 2025-08-11 ENCOUNTER — HOSPITAL ENCOUNTER (OUTPATIENT)
Dept: NON INVASIVE DIAGNOSTICS | Facility: CLINIC | Age: 70
Discharge: HOME | End: 2025-08-11
Attending: INTERNAL MEDICINE
Payer: COMMERCIAL

## 2025-08-20 LAB
LEFT EYE DIABETIC RETINOPATHY: NORMAL
RIGHT EYE DIABETIC RETINOPATHY: NORMAL

## (undated) DEVICE — BASKET SPECIMEN RETRIVAL 1.9FR 120CM

## (undated) DEVICE — GLOVE INDICATOR PI UNDERGLOVE SZ 8 BLUE

## (undated) DEVICE — STERILE SURGICAL LUBRICANT,  TUBE: Brand: SURGILUBE

## (undated) DEVICE — GLOVE SRG BIOGEL 7.5

## (undated) DEVICE — 400 MICRON SINGLE-USE HOLMIUM FIBER ASSEMBLY WITH FLAT TIP: Brand: OPTILITE

## (undated) DEVICE — PACK TUR

## (undated) DEVICE — CATH URETERAL 5FR X 70 CM FLEX TIP POLYUR BARD

## (undated) DEVICE — GLOVE SRG BIOGEL ORTHOPEDIC 7.5

## (undated) DEVICE — SHEATH URETERAL ACCESS 12/14FR 35CM PROXIS

## (undated) DEVICE — CATH URET .038 10FR 50CM DUAL LUMEN

## (undated) DEVICE — SYRINGE 10ML LL

## (undated) DEVICE — TUBING SUCTION 5MM X 12 FT

## (undated) DEVICE — GUIDEWIRE STRGHT TIP 0.035 IN  SOLO PLUS

## (undated) DEVICE — GLOVE SRG BIOGEL ECLIPSE 7.5

## (undated) DEVICE — SPECIMEN CONTAINER STERILE PEEL PACK

## (undated) DEVICE — LASER FIBER HOLMIUM 272MICRON

## (undated) DEVICE — UROLOGIC DRAIN BAG: Brand: UNBRANDED

## (undated) DEVICE — SCD SEQUENTIAL COMPRESSION COMFORT SLEEVE MEDIUM KNEE LENGTH: Brand: KENDALL SCD

## (undated) DEVICE — FIBER STD QUANTA 272 MICRON

## (undated) DEVICE — 3000CC GUARDIAN II: Brand: GUARDIAN

## (undated) DEVICE — SINGLE-USE DIGITAL FLEXIBLE URETEROSCOPE: Brand: APTRA

## (undated) DEVICE — BASKET STONE RTRVL ZERO TIP 2.4FR

## (undated) DEVICE — UROCATCH BAG